# Patient Record
Sex: FEMALE | Race: WHITE | Employment: OTHER | URBAN - METROPOLITAN AREA
[De-identification: names, ages, dates, MRNs, and addresses within clinical notes are randomized per-mention and may not be internally consistent; named-entity substitution may affect disease eponyms.]

---

## 2018-09-10 ENCOUNTER — HOSPITAL ENCOUNTER (EMERGENCY)
Facility: CLINIC | Age: 73
Discharge: HOME | End: 2018-09-10
Attending: PHYSICIAN ASSISTANT | Admitting: PHYSICIAN ASSISTANT
Payer: MEDICARE

## 2018-09-10 VITALS
TEMPERATURE: 98.2 F | DIASTOLIC BLOOD PRESSURE: 79 MMHG | WEIGHT: 112 LBS | RESPIRATION RATE: 16 BRPM | OXYGEN SATURATION: 97 % | BODY MASS INDEX: 19.84 KG/M2 | HEIGHT: 63 IN | SYSTOLIC BLOOD PRESSURE: 152 MMHG

## 2018-09-10 DIAGNOSIS — S61.412A LACERATION OF LEFT HAND WITHOUT FOREIGN BODY, INITIAL ENCOUNTER: ICD-10-CM

## 2018-09-10 PROCEDURE — 99283 EMERGENCY DEPT VISIT LOW MDM: CPT | Mod: 25

## 2018-09-10 PROCEDURE — 90471 IMMUNIZATION ADMIN: CPT

## 2018-09-10 PROCEDURE — 25000128 H RX IP 250 OP 636: Performed by: PHYSICIAN ASSISTANT

## 2018-09-10 PROCEDURE — 12001 RPR S/N/AX/GEN/TRNK 2.5CM/<: CPT

## 2018-09-10 PROCEDURE — 90715 TDAP VACCINE 7 YRS/> IM: CPT | Performed by: PHYSICIAN ASSISTANT

## 2018-09-10 RX ADMIN — CLOSTRIDIUM TETANI TOXOID ANTIGEN (FORMALDEHYDE INACTIVATED), CORYNEBACTERIUM DIPHTHERIAE TOXOID ANTIGEN (FORMALDEHYDE INACTIVATED), BORDETELLA PERTUSSIS TOXOID ANTIGEN (GLUTARALDEHYDE INACTIVATED), BORDETELLA PERTUSSIS FILAMENTOUS HEMAGGLUTININ ANTIGEN (FORMALDEHYDE INACTIVATED), BORDETELLA PERTUSSIS PERTACTIN ANTIGEN, AND BORDETELLA PERTUSSIS FIMBRIAE 2/3 ANTIGEN 0.5 ML: 5; 2; 2.5; 5; 3; 5 INJECTION, SUSPENSION INTRAMUSCULAR at 15:12

## 2018-09-10 ASSESSMENT — ENCOUNTER SYMPTOMS: WOUND: 1

## 2018-09-10 NOTE — ED AVS SNAPSHOT
Emergency Department    [address] [facility] [address]    Phone:  [phone]    Fax:  [phone]                                       Dorothea Dugan   MRN: [id number]    Department:   Emergency Department   Date of Visit:  9/10/2018           After Visit Summary Signature Page     I have received my discharge instructions, and my questions have been answered. I have discussed any challenges I see with this plan with the nurse or doctor.    ..........................................................................................................................................  Patient/Patient Representative Signature      ..........................................................................................................................................  Patient Representative Print Name and Relationship to Patient    ..................................................               ................................................  Date                                            Time    ..........................................................................................................................................  Reviewed by Signature/Title    ...................................................              ..............................................  Date                                                            Time          22EPIC Rev 08/18

## 2018-09-10 NOTE — ED PROVIDER NOTES
"  History     Chief Complaint:  Laceration    HPI   Dorothea Dugan is a 73 year old female who presents for evaluation of a laceration. She was attempting to open a vial of hand lotion with scissors, when it slipped and sliced her left hand between her thumb and index finger. She wrapped her hand in a towel and has been elevating her arm. She is not on any blood thinners.    Allergies:  Strawberry  Penicillins     Medications:    The patient is not currently taking any prescribed medications.     Past Medical History:    Anxiety  Insomnia  Major depressive disorder  Osteoporosis    Past Surgical History:    ENT surgery   section  Orthopedic surgery - ankle    Family History:    No past pertinent family history.     Social History:  Relationship status:   Tobacco use: Former.  Alcohol use: Yes, socially.  The patient presents with her .    Marital Status:   [2]     Review of Systems   Skin: Positive for wound.   All other systems reviewed and are negative.    Physical Exam   Vitals:  Patient Vitals for the past 24 hrs:   BP Temp Temp src Heart Rate Resp SpO2 Height Weight   09/10/18 1408 152/79 98.2  F (36.8  C) Oral 123 16 97 % 1.6 m (5' 3\") 50.8 kg (112 lb)        Physical Exam  Constitutional: Alert, attentive, GCS 15   CV: 2+ radial pulse, brisk distal cap refill  Pulm: No respiratory distress  MSK: Full ROM of left 1st and 2nd digit without pain.   Neurological: Alert, attentive  5/5 strength to the 1st and 2nd MCP and IP joints. 5/5 strength with thumb-index finger opposition. sensation intact distal to laceration.   Skin: Skin is warm and dry. 1 cm laceration between left 1st and 2nd digit web space.   Psych: Normal mood and affect     Emergency Department Course        Laceration Repair        LACERATION:  A simple and superficial minimally Contaminated 1 cm laceration.      LOCATION:  Web space between 1st and 2nd digit on left hand      FUNCTION:  Distally sensation, " circulation, motor and tendon function are intact.      ANESTHESIA:  Local using lidocaine 1% w/ epinephrine total of 1 mLs      PREPARATION:  Irrigation with Normal Saline and Shur Clens      DEBRIDEMENT:  no debridement and wound explored, no foreign body found      CLOSURE:  Wound was closed with One Layer.  Skin closed with 1 x 5.0 Ethylon using interrupted sutures.       Interventions:  1512 Tdap 0.5 mL IM     Emergency Department Course:  Nursing notes and vitals reviewed.  I performed an exam of the patient as documented above.   Findings and plan explained to the Patient. Patient discharged home with instructions regarding supportive care, medications, and reasons to return. The importance of close follow-up was reviewed.    I personally answered all related questions prior to discharge.    Impression & Plan      Medical Decision Making:  Dorothea Dugan is a 73 year old female who presents for evaluation of a laceration in between the first and second webspace of the left hand.  CMS intact distally.  The wound was carefully evaluated and explored.  There was significant amount of bleeding from the laceration so 1 suture was placed to limit bleeding. There is no evidence of muscular, tendon, or bony damage with this laceration.  No signs of foreign body.  Possible complications (infection, scarring) were reviewed with the patient.  Typically with sutures on the hand I recommend having the sutures removed in 7 days.  Patient is going out of town and is leaving Saturday morning.  I told her that she can make an appointment with her primary Friday  afternoon and have them evaluate the suture and if appropriate this can be removed at that time.  Tetanus was updated.      Diagnosis:    ICD-10-CM    1. Laceration of left hand without foreign body, initial encounter S61.412A       Disposition:   Discharged to home    Scribe Disclosure:  Hue RICHARDSON, am serving as a scribe at 2:28 PM on 9/10/2018 to  document services personally performed by Jacki Zurita PA-C, based on my observations and the provider's statements to me.     Hue Wagner  9/10/2018    EMERGENCY DEPARTMENT       Jacki Zurita PA-C  09/10/18 5929

## 2018-09-10 NOTE — ED NOTES
Bed: FT02  Expected date:   Expected time:   Means of arrival:   Comments:  Triage Blomme hand lac

## 2018-09-10 NOTE — ED AVS SNAPSHOT
Emergency Department    [address] [facility] [address]    Phone:  [phone]    Fax:  [phone]                                       Dorothea Dugan   MRN: [id number]    Department:   Emergency Department   Date of Visit:  9/10/2018           Patient Information     Date Of Birth          1945        Your diagnoses for this visit were:     Laceration of left hand without foreign body, initial encounter        You were seen by Jacki Zurita PA-C.      Follow-up Information     Follow up with [facility] In 5 days.    Why:  For suture removal    Contact information:    [address] [facility] [id number] [phone]          Discharge Instructions       *Suture removal in 5-7 days.   *Return for focal numbness, tingling, or weakness.  Return for worsening pain.    Discharge Instructions  Laceration (Cut)    You were seen today for a laceration (cut).  Your provider examined your laceration for any problems such a buried foreign body (like glass, a splinter, or gravel), or injury to blood vessels, tendons, and nerves.  Your provider may have also rinsed and/or scrubbed your laceration to help prevent an infection. It may not be possible to find all problems with your laceration on the first visit; occasionally foreign bodies or a tendon injury can go undetected.    Your laceration may have been closed in one of several ways:    No closure: many wounds will heal just fine without closure.    Stitches: regular stitches that require removal.    Staples: skin staples are often used in the scalp/head.    Wound adhesive (glue): skin glue can be used for certain lacerations and doesn t require removal.    Wound strips (aka Butterfly bandages or steri-strips): these are bandages that help to close a wound.    Absorbable stitches:  dissolving  stitches that go away on their own and usually don t require removal.    A small percentage of wounds will develop an  infection regardless of how well the wound is cared for. Antibiotics are generally not indicated to prevent an infection so are only given for a small number of high-risk wounds. Some lacerations are too high risk to close, and are left open to heal because closure can increase the likelihood that an infection will develop.    Remember that all lacerations, no matter how expertly repaired, will cause scarring. We consider many factors, techniques, and materials, in our efforts to provide the best possible cosmetic outcome.    Generally, every Emergency Department visit should have a follow-up clinic visit with either a primary or a specialty clinic/provider. Please follow-up as instructed by your emergency provider today.     Return to the Emergency Department right away if:    You have more redness, swelling, pain, drainage (pus), a bad smell, or red streaking from your laceration as these symptoms could indicate an infection.    You have a fever of 100.4 F or more.    You have bleeding that you cannot stop at home. If your cut starts to bleed, hold pressure on the bleeding area with a clean cloth or put pressure over the bandage.  If the bleeding does not stop after using constant pressure for 30 minutes, you should return to the Emergency Department for further treatment.    An area past the laceration is cool, pale, or blue compared with the other side, or has a slower return of color when squeezed.    Your dressing seems too tight or starts to get uncomfortable or painful. For children, signs of a problem might be irritability or restlessness.    You have loss of normal function or use of an area, such as being unable to straighten or bend a finger normally.    You have a numb area past the laceration.    Return to the Emergency Department or see your regular provider if:    The laceration starts to come open.     You have something coming out of the cut or a feeling that there is something in the  laceration.    Your wound will not heal, or keeps breaking open. There can always be glass, wood, dirt or other things in any wound.  They will not always show up, even on x-rays.  If a wound does not heal, this may be why, and it is important to follow-up with your regular provider.    Home Care:    Take your dressing off in 12-24 hours, or as instructed by your provider, to check your laceration. Remove the dressing sooner if it seems too tight or painful, or if it is getting numb, tingly, or pale past the dressing.    Gently wash your laceration 1-2 times daily with clean water and mild soap. It is okay to shower or run clean water over the laceration, but do not let the laceration soak in water (no swimming).    If your laceration was closed with wound adhesive or strips: pat it dry and leave it open to the air. For all other repairs: after you wash your laceration, or at least 2 times a day, apply antibiotic ointment (such as Neosporin  or Bacitracin ) to the laceration, then cover it with a Band-Aid  or gauze.    Keep the laceration clean. Wear gloves or other protective clothing if you are around dirt.    Follow-up for removal:    If your wound was closed with staples or regular stitches, they need to be removed according to the instructions and timeline specified by your provider today.    If your wound was closed with absorbable ( dissolving ) sutures, they should fall out, dissolve, or not be visible in about one week. If they are still visible, then they should be removed according to the instructions and timeline specified by your provider today.    Scars:  To help minimize scarring:    Wear sunscreen over the healed laceration when out in the sun.    Massage the area regularly once healed.    You may apply Vitamin E to the healed wound.    Wait. Scars improve in appearance over months and years.    If you were given a prescription for medicine here today, be sure to read all of the information  (including the package insert) that comes with your prescription.  This will include important information about the medicine, its side effects, and any warnings that you need to know about.  The pharmacist who fills the prescription can provide more information and answer questions you may have about the medicine.  If you have questions or concerns that the pharmacist cannot address, please call or return to the Emergency Department.       Remember that you can always come back to the Emergency Department if you are not able to see your regular provider in the amount of time listed above, if you get any new symptoms, or if there is anything that worries you.     24 Hour Appointment Hotline       To make an appointment at any [facility], call [phone] ([phone]). If you don't have a family doctor or clinic, we will help you find one. [facility] are conveniently located to serve the needs of you and your family.             Review of your medicines      Notice     You have not been prescribed any medications.            Orders Needing Specimen Collection     None      Pending Results     No orders found from 9/8/2018 to 9/11/2018.            Pending Culture Results     No orders found from 9/8/2018 to 9/11/2018.            Pending Results Instructions     If you had any lab results that were not finalized at the time of your Discharge, you can call the ED Lab Result RN at [phone]. You will be contacted by this team for any positive Lab results or changes in treatment. The nurses are available 7 days a week from 10A to 6:30P.  You can leave a message 24 hours per day and they will return your call.        Test Results From Your Hospital Stay               Clinical Quality Measure: Blood Pressure Screening     Your blood pressure was checked while you were in the emergency department today. The last reading we obtained was  BP: 152/79 . Please read the guidelines below about what these  "numbers mean and what you should do about them.  If your systolic blood pressure (the top number) is less than 120 and your diastolic blood pressure (the bottom number) is less than 80, then your blood pressure is normal. There is nothing more that you need to do about it.  If your systolic blood pressure (the top number) is 120-139 or your diastolic blood pressure (the bottom number) is 80-89, your blood pressure may be higher than it should be. You should have your blood pressure rechecked within a year by a primary care provider.  If your systolic blood pressure (the top number) is 140 or greater or your diastolic blood pressure (the bottom number) is 90 or greater, you may have high blood pressure. High blood pressure is treatable, but if left untreated over time it can put you at risk for heart attack, stroke, or kidney failure. You should have your blood pressure rechecked by a primary care provider within the next 4 weeks.  If your provider in the emergency department today gave you specific instructions to follow-up with your doctor or provider even sooner than that, you should follow that instruction and not wait for up to 4 weeks for your follow-up visit.        Thank you for choosing [facility]       Thank you for choosing [facility] for your care. Our goal is always to provide you with excellent care. Hearing back from our patients is one way we can continue to improve our services. Please take a few minutes to complete the written survey that you may receive in the mail after you visit with us. Thank you!        [employer] Information     [employer] lets you send messages to your doctor, view your test results, renew your prescriptions, schedule appointments and more. To sign up, go to [email or web address] . Click on \"Log in\" on the left side of the screen, which will take you to the Welcome page. Then click on \"Sign up Now\" on the right side of the page.     You will be asked to enter the access code listed " below, as well as some personal information. Please follow the directions to create your username and password.     Your access code is: [email or web address]  Expires: 2018  2:54 PM     Your access code will  in 90 days. If you need help or a new code, please call your [facility] clinic or [phone].        Care EveryWhere ID     This is your Care EveryWhere ID. This could be used by other organizations to access your [facility] medical records  [id number]        Equal Access to Services     COMFORT WRAY : Rl renee Sohiral, wanasima luezekiel, qayunier kaalelías art, halie nichols . So [facility] [phone].    ATENCIÓN: Si habla español, tiene a busby disposición servicios gratuitos de asistencia lingüística. Llame al [phone].    We comply with applicable federal civil rights laws and [place] laws. We do not discriminate on the basis of race, color, national origin, age, disability, sex, sexual orientation, or gender identity.            After Visit Summary       This is your record. Keep this with you and show to your community pharmacist(s) and doctor(s) at your next visit.

## 2018-09-10 NOTE — DISCHARGE INSTRUCTIONS
*Suture removal in 5-7 days.   *Return for focal numbness, tingling, or weakness.  Return for worsening pain.    Discharge Instructions  Laceration (Cut)    You were seen today for a laceration (cut).  Your provider examined your laceration for any problems such a buried foreign body (like glass, a splinter, or gravel), or injury to blood vessels, tendons, and nerves.  Your provider may have also rinsed and/or scrubbed your laceration to help prevent an infection. It may not be possible to find all problems with your laceration on the first visit; occasionally foreign bodies or a tendon injury can go undetected.    Your laceration may have been closed in one of several ways:    No closure: many wounds will heal just fine without closure.    Stitches: regular stitches that require removal.    Staples: skin staples are often used in the scalp/head.    Wound adhesive (glue): skin glue can be used for certain lacerations and doesn t require removal.    Wound strips (aka Butterfly bandages or steri-strips): these are bandages that help to close a wound.    Absorbable stitches:  dissolving  stitches that go away on their own and usually don t require removal.    A small percentage of wounds will develop an infection regardless of how well the wound is cared for. Antibiotics are generally not indicated to prevent an infection so are only given for a small number of high-risk wounds. Some lacerations are too high risk to close, and are left open to heal because closure can increase the likelihood that an infection will develop.    Remember that all lacerations, no matter how expertly repaired, will cause scarring. We consider many factors, techniques, and materials, in our efforts to provide the best possible cosmetic outcome.    Generally, every Emergency Department visit should have a follow-up clinic visit with either a primary or a specialty clinic/provider. Please follow-up as instructed by your emergency provider  today.     Return to the Emergency Department right away if:    You have more redness, swelling, pain, drainage (pus), a bad smell, or red streaking from your laceration as these symptoms could indicate an infection.    You have a fever of 100.4 F or more.    You have bleeding that you cannot stop at home. If your cut starts to bleed, hold pressure on the bleeding area with a clean cloth or put pressure over the bandage.  If the bleeding does not stop after using constant pressure for 30 minutes, you should return to the Emergency Department for further treatment.    An area past the laceration is cool, pale, or blue compared with the other side, or has a slower return of color when squeezed.    Your dressing seems too tight or starts to get uncomfortable or painful. For children, signs of a problem might be irritability or restlessness.    You have loss of normal function or use of an area, such as being unable to straighten or bend a finger normally.    You have a numb area past the laceration.    Return to the Emergency Department or see your regular provider if:    The laceration starts to come open.     You have something coming out of the cut or a feeling that there is something in the laceration.    Your wound will not heal, or keeps breaking open. There can always be glass, wood, dirt or other things in any wound.  They will not always show up, even on x-rays.  If a wound does not heal, this may be why, and it is important to follow-up with your regular provider.    Home Care:    Take your dressing off in 12-24 hours, or as instructed by your provider, to check your laceration. Remove the dressing sooner if it seems too tight or painful, or if it is getting numb, tingly, or pale past the dressing.    Gently wash your laceration 1-2 times daily with clean water and mild soap. It is okay to shower or run clean water over the laceration, but do not let the laceration soak in water (no swimming).    If your  laceration was closed with wound adhesive or strips: pat it dry and leave it open to the air. For all other repairs: after you wash your laceration, or at least 2 times a day, apply antibiotic ointment (such as Neosporin  or Bacitracin ) to the laceration, then cover it with a Band-Aid  or gauze.    Keep the laceration clean. Wear gloves or other protective clothing if you are around dirt.    Follow-up for removal:    If your wound was closed with staples or regular stitches, they need to be removed according to the instructions and timeline specified by your provider today.    If your wound was closed with absorbable ( dissolving ) sutures, they should fall out, dissolve, or not be visible in about one week. If they are still visible, then they should be removed according to the instructions and timeline specified by your provider today.    Scars:  To help minimize scarring:    Wear sunscreen over the healed laceration when out in the sun.    Massage the area regularly once healed.    You may apply Vitamin E to the healed wound.    Wait. Scars improve in appearance over months and years.    If you were given a prescription for medicine here today, be sure to read all of the information (including the package insert) that comes with your prescription.  This will include important information about the medicine, its side effects, and any warnings that you need to know about.  The pharmacist who fills the prescription can provide more information and answer questions you may have about the medicine.  If you have questions or concerns that the pharmacist cannot address, please call or return to the Emergency Department.       Remember that you can always come back to the Emergency Department if you are not able to see your regular provider in the amount of time listed above, if you get any new symptoms, or if there is anything that worries you.

## 2018-10-23 ENCOUNTER — TRANSFERRED RECORDS (OUTPATIENT)
Dept: HEALTH INFORMATION MANAGEMENT | Facility: CLINIC | Age: 73
End: 2018-10-23

## 2018-12-04 ENCOUNTER — OFFICE VISIT (OUTPATIENT)
Dept: CARDIOLOGY | Facility: CLINIC | Age: 73
End: 2018-12-04
Payer: COMMERCIAL

## 2018-12-04 VITALS
HEART RATE: 96 BPM | SYSTOLIC BLOOD PRESSURE: 138 MMHG | HEIGHT: 66 IN | BODY MASS INDEX: 16.55 KG/M2 | WEIGHT: 103 LBS | DIASTOLIC BLOOD PRESSURE: 62 MMHG

## 2018-12-04 DIAGNOSIS — R94.31 NONSPECIFIC ABNORMAL ELECTROCARDIOGRAM (ECG) (EKG): Primary | ICD-10-CM

## 2018-12-04 PROCEDURE — 99203 OFFICE O/P NEW LOW 30 MIN: CPT | Performed by: INTERNAL MEDICINE

## 2018-12-04 RX ORDER — ESCITALOPRAM OXALATE 5 MG/1
2.5 TABLET ORAL DAILY
COMMUNITY
End: 2024-03-11

## 2018-12-04 NOTE — PROGRESS NOTES
Service Date: 2018      REQUESTING PROVIDER:  Dr. Virgen.      INDICATION:  Short MO noted on an ECG.      HISTORY OF PRESENT ILLNESS:  Ms. Tucker is a pleasant 73-year-old female with no significant past medical history other than anxiety who was seen in annual followup and underwent an ECG.  This showed sinus rhythm with a short MO interval of 108 milliseconds.  Interestingly, from a cardiovascular standpoint, the patient has no symptoms and denies any chest pain, chest pressure, shortness of breath, orthopnea, paroxysmal nocturnal dyspnea, syncope, presyncope or palpitations.  She has never had prior cardiac issues.  She has no cardiovascular risk factors such as diabetes, tobacco abuse, dyslipidemia or hypertension.      Please see my separate note with her full physical examination.      IMPRESSION AND PLAN:  Ms. Tucker is a pleasant 73-year-old female with a nonspecific ECG finding of a short MO interval.  I do not see evidence for short MO syndrome as she is having no tachycardia.  I cannot find an indication to perform a Holter/ZIO monitor as she is completely asymptomatic.  However, given the nonspecific ECG findings I do believe that it would be reasonable to consider a transthoracic echocardiogram to make sure the patient has a structurally normal heart.  She will follow up as needed unless the transthoracic echocardiogram is abnormal.  I have instructed the patient that with any new symptoms such as chest pain, dyspnea with exertion, palpitations, syncope or presyncope, she will need to be re-evaluated by Cardiology in the future.         CHIKA ASHBY MD             D: 2018   T: 2018   MT: AMA      Name:     KYLER TUCKER   MRN:      [id number]        Account:      [id number]   :      1945           Service Date: 2018      Document: [id number]

## 2018-12-04 NOTE — MR AVS SNAPSHOT
After Visit Summary   12/4/2018    Dorothea Dugan    MRN: [id number]           Patient Information     Date Of Birth          1945        Visit Information        Provider Department      12/4/2018 8:45 AM Michele Jim MD [facility]        Today's Diagnoses     Nonspecific abnormal electrocardiogram (ECG) (EKG)    -  1       Follow-ups after your visit        Your next 10 appointments already scheduled     Dec 11, 2018  7:45 AM CST   Echo Complete with [id number]   [facility] CV Echocardiography (Card[facility]ovascular [facility] at [facility] [place])    [address] [place] [address] [id number] [phone]           1. Please bring or wear a comfortable two-piece outfit. 2. You may eat, drink and take your normal medicines. 3. For any questions that cannot be answered, please contact the ordering physician              Future tests that were ordered for you today     Open Future Orders        Priority Expected Expires Ordered    Echocardiogram Complete Routine  12/4/2019 12/4/2018            Who to contact     If you have questions or need follow up information about today's clinic visit or your schedule please contact [facility] directly at [phone].  Normal or non-critical lab and imaging results will be communicated to you by MyChart, letter or phone within 4 business days after the clinic has received the results. If you do not hear from us within 7 days, please contact the clinic through MyChart or phone. If you have a critical or abnormal lab result, we will notify you by phone as soon as possible.  Submit refill requests through [employer] or call your pharmacy and they will forward the refill request to us. Please allow 3 business days for your refill to be completed.          Additional Information About Your Visit        Care EveryWhere ID     This is your Care EveryWhere ID. This could  "be used by other organizations to access your [facility] medical records  [id number]        Your Vitals Were     Pulse Height BMI (Body Mass Index)             96 1.664 m (5' 5.5\") 16.88 kg/m2          Blood Pressure from Last 3 Encounters:   12/04/18 138/62   09/10/18 152/79   12/15/14 131/69    Weight from Last 3 Encounters:   12/04/18 46.7 kg (103 lb)   09/10/18 50.8 kg (112 lb)   12/15/14 54.4 kg (120 lb)               Primary Care Provider Office Phone # Fax #    Sun Virgen [phone]       [address]  [address]  [facility] [address]        Equal Access to Services     COMFORT WRAY : Rl gonzalezo Sohiral, waaxda luqadaha, qaybta kaalmada adeegyada, halie tapia. So [facility] [phone].    ATENCIÓN: Si habla español, tiene a busby disposición servicios gratuitos de asistencia lingüística. Llame al [phone].    We comply with applicable federal civil rights laws and [place] laws. We do not discriminate on the basis of race, color, national origin, age, disability, sex, sexual orientation, or gender identity.            Thank you!     Thank you for choosing [facility]  for your care. Our goal is always to provide you with excellent care. Hearing back from our patients is one way we can continue to improve our services. Please take a few minutes to complete the written survey that you may receive in the mail after your visit with us. Thank you!             Your Updated Medication List - Protect others around you: Learn how to safely use, store and throw away your medicines at www.disposemymeds.org.          This list is accurate as of 12/4/18  9:57 AM.  Always use your most recent med list.                   Brand Name Dispense Instructions for use Diagnosis    escitalopram 5 MG tablet    LEXAPRO     Take by mouth daily 1/2 tab daily.        MULTI VITAMIN DAILY PO      Take by mouth daily          "

## 2018-12-04 NOTE — PROGRESS NOTES
HPI and Plan:   See dictation    Orders Placed This Encounter   Procedures     Echocardiogram Complete       Orders Placed This Encounter   Medications     escitalopram (LEXAPRO) 5 MG tablet     Sig: Take by mouth daily 1/2 tab daily.     Multiple Vitamin (MULTI VITAMIN DAILY PO)     Sig: Take by mouth daily       There are no discontinued medications.      Encounter Diagnosis   Name Primary?     Nonspecific abnormal electrocardiogram (ECG) (EKG) Yes       CURRENT MEDICATIONS:  Current Outpatient Prescriptions   Medication Sig Dispense Refill     escitalopram (LEXAPRO) 5 MG tablet Take by mouth daily 1/2 tab daily.       Multiple Vitamin (MULTI VITAMIN DAILY PO) Take by mouth daily         ALLERGIES     Allergies   Allergen Reactions     Strawberry      Penicillins Rash       PAST MEDICAL HISTORY:  Past Medical History:   Diagnosis Date     Anxiety state, unspecified      Breast screening, unspecified      Insomnia, unspecified      Major depressive disorder, single episode, unspecified      Osteoporosis, unspecified      Unspecified menopausal and postmenopausal disorder        PAST SURGICAL HISTORY:  Past Surgical History:   Procedure Laterality Date     ENT SURGERY      oral surgery 2014     GYN SURGERY           ORTHOPEDIC SURGERY      ankle surgery       FAMILY HISTORY:  Family History   Problem Relation Age of Onset     Other - See Comments Mother 95     Old age     Lymphoma Mother      Prostate Cancer Father        SOCIAL HISTORY:  Social History     Social History     Marital status:      Spouse name: N/A     Number of children: N/A     Years of education: N/A     Social History Main Topics     Smoking status: Former Smoker     Packs/day: 0.10     Years: 2.00     Types: Cigarettes     Start date:      Quit date:      Smokeless tobacco: Never Used     Alcohol use Yes      Comment: socially     Drug use: No     Sexual activity: Not Asked     Other Topics Concern     Parent/Sibling W/  "Cabg, Mi Or Angioplasty Before 65f 55m? No     Social History Narrative       Review of Systems:  Skin:  Positive for (dry skin)       Eyes:  Positive for (reading) glasses    ENT:  Negative      Respiratory:  Positive for dyspnea on exertion     Cardiovascular:  Negative      Gastroenterology: Negative      Genitourinary:  Negative      Musculoskeletal:  Negative      Neurologic:  Negative      Psychiatric:  Positive for anxiety    Heme/Lymph/Imm:  Positive for allergies    Endocrine:  Negative        Physical Exam:  Vitals: /62  Pulse 96  Ht 1.664 m (5' 5.5\")  Wt 46.7 kg (103 lb)  BMI 16.88 kg/m2    Constitutional:  cooperative;in no acute distress        Skin:  warm and dry to the touch          Head:  normocephalic        Eyes:  sclera white        Lymph:No Cervical lymphadenopathy present     ENT:  no pallor or cyanosis        Neck:  no stiffness        Respiratory:  clear to auscultation         Cardiac: regular rhythm;normal S1 and S2                pulses full and equal                                        GI:  abdomen soft        Extremities and Muscular Skeletal:  no edema              Neurological:  affect appropriate        Psych:  Alert and Oriented x 3        CC  No referring provider defined for this encounter.              "

## 2018-12-04 NOTE — LETTER
2018    Sun Virgen MD, MD  [address]  [facility] [address]    RE: Dorothea Dugan       Dear Colleague,    I had the pleasure of seeing Dorothea Dugan in the [facility].    HPI and Plan:   See dictation    Orders Placed This Encounter   Procedures     Echocardiogram Complete       Orders Placed This Encounter   Medications     escitalopram (LEXAPRO) 5 MG tablet     Sig: Take by mouth daily 1/2 tab daily.     Multiple Vitamin (MULTI VITAMIN DAILY PO)     Sig: Take by mouth daily       There are no discontinued medications.      Encounter Diagnosis   Name Primary?     Nonspecific abnormal electrocardiogram (ECG) (EKG) Yes       CURRENT MEDICATIONS:  Current Outpatient Prescriptions   Medication Sig Dispense Refill     escitalopram (LEXAPRO) 5 MG tablet Take by mouth daily 1/2 tab daily.       Multiple Vitamin (MULTI VITAMIN DAILY PO) Take by mouth daily         ALLERGIES     Allergies   Allergen Reactions     Strawberry      Penicillins Rash       PAST MEDICAL HISTORY:  Past Medical History:   Diagnosis Date     Anxiety state, unspecified      Breast screening, unspecified      Insomnia, unspecified      Major depressive disorder, single episode, unspecified      Osteoporosis, unspecified      Unspecified menopausal and postmenopausal disorder        PAST SURGICAL HISTORY:  Past Surgical History:   Procedure Laterality Date     ENT SURGERY      oral surgery 2014     GYN SURGERY           ORTHOPEDIC SURGERY      ankle surgery       FAMILY HISTORY:  Family History   Problem Relation Age of Onset     Other - See Comments Mother 95     Old age     Lymphoma Mother      Prostate Cancer Father        SOCIAL HISTORY:  Social History     Social History     Marital status:      Spouse name: N/A     Number of children: N/A     Years of education: N/A     Social History Main Topics     Smoking status: Former Smoker     Packs/day: 0.10     Years: 2.00      "Types: Cigarettes     Start date: 1967     Quit date: 1978     Smokeless tobacco: Never Used     Alcohol use Yes      Comment: socially     Drug use: No     Sexual activity: Not Asked     Other Topics Concern     Parent/Sibling W/ Cabg, Mi Or Angioplasty Before 65f 55m? No     Social History Narrative       Review of Systems:  Skin:  Positive for (dry skin)       Eyes:  Positive for (reading) glasses    ENT:  Negative      Respiratory:  Positive for dyspnea on exertion     Cardiovascular:  Negative      Gastroenterology: Negative      Genitourinary:  Negative      Musculoskeletal:  Negative      Neurologic:  Negative      Psychiatric:  Positive for anxiety    Heme/Lymph/Imm:  Positive for allergies    Endocrine:  Negative        Physical Exam:  Vitals: /62  Pulse 96  Ht 1.664 m (5' 5.5\")  Wt 46.7 kg (103 lb)  BMI 16.88 kg/m2    Constitutional:  cooperative;in no acute distress        Skin:  warm and dry to the touch          Head:  normocephalic        Eyes:  sclera white        Lymph:No Cervical lymphadenopathy present     ENT:  no pallor or cyanosis        Neck:  no stiffness        Respiratory:  clear to auscultation         Cardiac: regular rhythm;normal S1 and S2                pulses full and equal                                        GI:  abdomen soft        Extremities and Muscular Skeletal:  no edema              Neurological:  affect appropriate        Psych:  Alert and Oriented x 3        CC  No referring provider defined for this encounter.                Thank you for allowing me to participate in the care of your patient.      Sincerely,     Michele Jim MD     [facility]    cc:   No referring provider defined for this encounter.        "

## 2018-12-04 NOTE — LETTER
12/4/2018      Sun Virgen MD, MD  [employer] [address]  [facility] [address]      RE: Kyler Tucker       Dear Colleague,    I had the pleasure of seeing Kyler Tucker in the [facility].    Service Date: 12/04/2018      REQUESTING PROVIDER:  Dr. Virgen.      INDICATION:  Short HI noted on an ECG.      HISTORY OF PRESENT ILLNESS:  Ms. Tucker is a pleasant 73-year-old female with no significant past medical history other than anxiety who was seen in annual followup and underwent an ECG.  This showed sinus rhythm with a short HI interval of 108 milliseconds.  Interestingly, from a cardiovascular standpoint, the patient has no symptoms and denies any chest pain, chest pressure, shortness of breath, orthopnea, paroxysmal nocturnal dyspnea, syncope, presyncope or palpitations.  She has never had prior cardiac issues.  She has no cardiovascular risk factors such as diabetes, tobacco abuse, dyslipidemia or hypertension.      Please see my separate note with her full physical examination.      IMPRESSION AND PLAN:  Ms. Tucker is a pleasant 73-year-old female with a nonspecific ECG finding of a short HI interval.  I do not see evidence for short HI syndrome as she is having no tachycardia.  I cannot find an indication to perform a Holter/ZIO monitor as she is completely asymptomatic.  However, given the nonspecific ECG findings I do believe that it would be reasonable to consider a transthoracic echocardiogram to make sure the patient has a structurally normal heart.  She will follow up as needed unless the transthoracic echocardiogram is abnormal.  I have instructed the patient that with any new symptoms such as chest pain, dyspnea with exertion, palpitations, syncope or presyncope, she will need to be re-evaluated by Cardiology in the future.         CHIKA ASHBY MD             D: 12/04/2018   T: 12/04/2018   MT: AMA      Name:     KYLER TUCKER   MRN:      [id number]         Account:      [id number]   :      1945           Service Date: 2018      Document: [id number]         Outpatient Encounter Prescriptions as of 2018   Medication Sig Dispense Refill     escitalopram (LEXAPRO) 5 MG tablet Take by mouth daily 1/2 tab daily.       Multiple Vitamin (MULTI VITAMIN DAILY PO) Take by mouth daily       No facility-administered encounter medications on file as of 2018.        Again, thank you for allowing me to participate in the care of your patient.      Sincerely,    Michele Jim MD     [facility]

## 2020-07-13 ENCOUNTER — HOSPITAL ENCOUNTER (INPATIENT)
Facility: CLINIC | Age: 75
LOS: 4 days | Discharge: HOME HEALTH | DRG: 470 | End: 2020-07-17
Attending: EMERGENCY MEDICINE | Admitting: INTERNAL MEDICINE
Payer: MEDICARE

## 2020-07-13 ENCOUNTER — APPOINTMENT (OUTPATIENT)
Dept: GENERAL RADIOLOGY | Facility: CLINIC | Age: 75
DRG: 470 | End: 2020-07-13
Attending: EMERGENCY MEDICINE
Payer: MEDICARE

## 2020-07-13 DIAGNOSIS — S72.001A CLOSED DISPLACED FRACTURE OF RIGHT FEMORAL NECK (H): ICD-10-CM

## 2020-07-13 DIAGNOSIS — S72.001A HIP FRACTURE, RIGHT, CLOSED, INITIAL ENCOUNTER (H): Primary | ICD-10-CM

## 2020-07-13 PROBLEM — S72.009A HIP FRACTURE (H): Status: ACTIVE | Noted: 2020-07-13

## 2020-07-13 PROBLEM — S72.009A HIP FRACTURE (H): Status: RESOLVED | Noted: 2020-07-13 | Resolved: 2020-07-13

## 2020-07-13 PROBLEM — D72.829 LEUKOCYTOSIS: Status: ACTIVE | Noted: 2020-07-13

## 2020-07-13 LAB
ABO + RH BLD: NORMAL
ABO + RH BLD: NORMAL
ANION GAP SERPL CALCULATED.3IONS-SCNC: 3 MMOL/L (ref 3–14)
BASOPHILS # BLD AUTO: 0 10E9/L (ref 0–0.2)
BASOPHILS NFR BLD AUTO: 0.2 %
BLD GP AB SCN SERPL QL: NORMAL
BLOOD BANK CMNT PATIENT-IMP: NORMAL
BUN SERPL-MCNC: 15 MG/DL (ref 7–30)
CALCIUM SERPL-MCNC: 10.4 MG/DL (ref 8.5–10.1)
CHLORIDE SERPL-SCNC: 107 MMOL/L (ref 94–109)
CO2 SERPL-SCNC: 29 MMOL/L (ref 20–32)
CREAT SERPL-MCNC: 0.83 MG/DL (ref 0.52–1.04)
DIFFERENTIAL METHOD BLD: ABNORMAL
EOSINOPHIL # BLD AUTO: 0.1 10E9/L (ref 0–0.7)
EOSINOPHIL NFR BLD AUTO: 0.6 %
ERYTHROCYTE [DISTWIDTH] IN BLOOD BY AUTOMATED COUNT: 13.1 % (ref 10–15)
GFR SERPL CREATININE-BSD FRML MDRD: 69 ML/MIN/{1.73_M2}
GLUCOSE SERPL-MCNC: 105 MG/DL (ref 70–99)
HCT VFR BLD AUTO: 41.1 % (ref 35–47)
HGB BLD-MCNC: 14.1 G/DL (ref 11.7–15.7)
IMM GRANULOCYTES # BLD: 0.1 10E9/L (ref 0–0.4)
IMM GRANULOCYTES NFR BLD: 0.6 %
INR PPP: 1.02 (ref 0.86–1.14)
LYMPHOCYTES # BLD AUTO: 1.5 10E9/L (ref 0.8–5.3)
LYMPHOCYTES NFR BLD AUTO: 8.3 %
MCH RBC QN AUTO: 29.9 PG (ref 26.5–33)
MCHC RBC AUTO-ENTMCNC: 34.3 G/DL (ref 31.5–36.5)
MCV RBC AUTO: 87 FL (ref 78–100)
MONOCYTES # BLD AUTO: 0.9 10E9/L (ref 0–1.3)
MONOCYTES NFR BLD AUTO: 5 %
NEUTROPHILS # BLD AUTO: 15.1 10E9/L (ref 1.6–8.3)
NEUTROPHILS NFR BLD AUTO: 85.3 %
NRBC # BLD AUTO: 0 10*3/UL
NRBC BLD AUTO-RTO: 0 /100
PLATELET # BLD AUTO: 284 10E9/L (ref 150–450)
POTASSIUM SERPL-SCNC: 4.6 MMOL/L (ref 3.4–5.3)
RBC # BLD AUTO: 4.71 10E12/L (ref 3.8–5.2)
SODIUM SERPL-SCNC: 139 MMOL/L (ref 133–144)
SPECIMEN EXP DATE BLD: NORMAL
WBC # BLD AUTO: 17.7 10E9/L (ref 4–11)

## 2020-07-13 PROCEDURE — 86850 RBC ANTIBODY SCREEN: CPT | Performed by: EMERGENCY MEDICINE

## 2020-07-13 PROCEDURE — 73502 X-RAY EXAM HIP UNI 2-3 VIEWS: CPT

## 2020-07-13 PROCEDURE — 96375 TX/PRO/DX INJ NEW DRUG ADDON: CPT

## 2020-07-13 PROCEDURE — 86901 BLOOD TYPING SEROLOGIC RH(D): CPT | Performed by: EMERGENCY MEDICINE

## 2020-07-13 PROCEDURE — 80048 BASIC METABOLIC PNL TOTAL CA: CPT | Performed by: EMERGENCY MEDICINE

## 2020-07-13 PROCEDURE — 86900 BLOOD TYPING SEROLOGIC ABO: CPT | Performed by: EMERGENCY MEDICINE

## 2020-07-13 PROCEDURE — 25000128 H RX IP 250 OP 636: Performed by: EMERGENCY MEDICINE

## 2020-07-13 PROCEDURE — 85025 COMPLETE CBC W/AUTO DIFF WBC: CPT | Performed by: EMERGENCY MEDICINE

## 2020-07-13 PROCEDURE — 71045 X-RAY EXAM CHEST 1 VIEW: CPT

## 2020-07-13 PROCEDURE — C9803 HOPD COVID-19 SPEC COLLECT: HCPCS

## 2020-07-13 PROCEDURE — 12000000 ZZH R&B MED SURG/OB

## 2020-07-13 PROCEDURE — 99222 1ST HOSP IP/OBS MODERATE 55: CPT | Mod: AI | Performed by: INTERNAL MEDICINE

## 2020-07-13 PROCEDURE — 96374 THER/PROPH/DIAG INJ IV PUSH: CPT

## 2020-07-13 PROCEDURE — 99285 EMERGENCY DEPT VISIT HI MDM: CPT | Mod: 25

## 2020-07-13 PROCEDURE — 85610 PROTHROMBIN TIME: CPT | Performed by: EMERGENCY MEDICINE

## 2020-07-13 PROCEDURE — U0003 INFECTIOUS AGENT DETECTION BY NUCLEIC ACID (DNA OR RNA); SEVERE ACUTE RESPIRATORY SYNDROME CORONAVIRUS 2 (SARS-COV-2) (CORONAVIRUS DISEASE [COVID-19]), AMPLIFIED PROBE TECHNIQUE, MAKING USE OF HIGH THROUGHPUT TECHNOLOGIES AS DESCRIBED BY CMS-2020-01-R: HCPCS | Performed by: EMERGENCY MEDICINE

## 2020-07-13 RX ORDER — DOCUSATE SODIUM 100 MG/1
100 CAPSULE, LIQUID FILLED ORAL 2 TIMES DAILY
Status: DISCONTINUED | OUTPATIENT
Start: 2020-07-13 | End: 2020-07-17 | Stop reason: HOSPADM

## 2020-07-13 RX ORDER — SODIUM CHLORIDE 9 MG/ML
INJECTION, SOLUTION INTRAVENOUS CONTINUOUS
Status: DISCONTINUED | OUTPATIENT
Start: 2020-07-13 | End: 2020-07-14

## 2020-07-13 RX ORDER — ONDANSETRON 2 MG/ML
4 INJECTION INTRAMUSCULAR; INTRAVENOUS EVERY 30 MIN PRN
Status: DISCONTINUED | OUTPATIENT
Start: 2020-07-13 | End: 2020-07-13

## 2020-07-13 RX ORDER — POLYETHYLENE GLYCOL 3350 17 G/17G
17 POWDER, FOR SOLUTION ORAL DAILY PRN
Status: DISCONTINUED | OUTPATIENT
Start: 2020-07-13 | End: 2020-07-17 | Stop reason: HOSPADM

## 2020-07-13 RX ORDER — NALOXONE HYDROCHLORIDE 0.4 MG/ML
.1-.4 INJECTION, SOLUTION INTRAMUSCULAR; INTRAVENOUS; SUBCUTANEOUS
Status: DISCONTINUED | OUTPATIENT
Start: 2020-07-13 | End: 2020-07-14

## 2020-07-13 RX ORDER — LIDOCAINE 40 MG/G
CREAM TOPICAL
Status: DISCONTINUED | OUTPATIENT
Start: 2020-07-13 | End: 2020-07-14

## 2020-07-13 RX ORDER — ONDANSETRON 2 MG/ML
4 INJECTION INTRAMUSCULAR; INTRAVENOUS EVERY 6 HOURS PRN
Status: DISCONTINUED | OUTPATIENT
Start: 2020-07-13 | End: 2020-07-17 | Stop reason: HOSPADM

## 2020-07-13 RX ORDER — MORPHINE SULFATE 4 MG/ML
4 INJECTION, SOLUTION INTRAMUSCULAR; INTRAVENOUS
Status: DISCONTINUED | OUTPATIENT
Start: 2020-07-13 | End: 2020-07-13

## 2020-07-13 RX ORDER — HYDROCODONE BITARTRATE AND ACETAMINOPHEN 5; 325 MG/1; MG/1
1-2 TABLET ORAL EVERY 4 HOURS PRN
Status: DISCONTINUED | OUTPATIENT
Start: 2020-07-13 | End: 2020-07-14

## 2020-07-13 RX ORDER — ACETAMINOPHEN 325 MG/1
650 TABLET ORAL EVERY 4 HOURS PRN
Status: DISCONTINUED | OUTPATIENT
Start: 2020-07-13 | End: 2020-07-14

## 2020-07-13 RX ORDER — MORPHINE SULFATE 2 MG/ML
1-2 INJECTION, SOLUTION INTRAMUSCULAR; INTRAVENOUS
Status: DISCONTINUED | OUTPATIENT
Start: 2020-07-13 | End: 2020-07-14

## 2020-07-13 RX ORDER — ONDANSETRON 4 MG/1
4 TABLET, ORALLY DISINTEGRATING ORAL EVERY 6 HOURS PRN
Status: DISCONTINUED | OUTPATIENT
Start: 2020-07-13 | End: 2020-07-17 | Stop reason: HOSPADM

## 2020-07-13 RX ADMIN — MORPHINE SULFATE 4 MG: 4 INJECTION INTRAVENOUS at 22:22

## 2020-07-13 RX ADMIN — ONDANSETRON 4 MG: 2 INJECTION INTRAMUSCULAR; INTRAVENOUS at 22:22

## 2020-07-13 ASSESSMENT — ACTIVITIES OF DAILY LIVING (ADL)
TOILETING: 0-->INDEPENDENT
RETIRED_COMMUNICATION: 0-->UNDERSTANDS/COMMUNICATES WITHOUT DIFFICULTY
BATHING: 0-->INDEPENDENT
RETIRED_EATING: 0-->INDEPENDENT
SWALLOWING: 0-->SWALLOWS FOODS/LIQUIDS WITHOUT DIFFICULTY
WHICH_OF_THE_ABOVE_FUNCTIONAL_RISKS_HAD_A_RECENT_ONSET_OR_CHANGE?: AMBULATION;FALL HISTORY
NUMBER_OF_TIMES_PATIENT_HAS_FALLEN_WITHIN_LAST_SIX_MONTHS: 1
DRESS: 0-->INDEPENDENT
COGNITION: 0 - NO COGNITION ISSUES REPORTED
AMBULATION: 0-->INDEPENDENT
TRANSFERRING: 0-->INDEPENDENT
FALL_HISTORY_WITHIN_LAST_SIX_MONTHS: YES

## 2020-07-13 ASSESSMENT — ENCOUNTER SYMPTOMS
ARTHRALGIAS: 1
BACK PAIN: 0
HEADACHES: 0

## 2020-07-13 ASSESSMENT — MIFFLIN-ST. JEOR: SCORE: 1043.88

## 2020-07-14 ENCOUNTER — ANESTHESIA EVENT (OUTPATIENT)
Dept: SURGERY | Facility: CLINIC | Age: 75
DRG: 470 | End: 2020-07-14
Payer: MEDICARE

## 2020-07-14 ENCOUNTER — APPOINTMENT (OUTPATIENT)
Dept: GENERAL RADIOLOGY | Facility: CLINIC | Age: 75
DRG: 470 | End: 2020-07-14
Attending: PHYSICIAN ASSISTANT
Payer: MEDICARE

## 2020-07-14 ENCOUNTER — APPOINTMENT (OUTPATIENT)
Dept: GENERAL RADIOLOGY | Facility: CLINIC | Age: 75
DRG: 470 | End: 2020-07-14
Attending: INTERNAL MEDICINE
Payer: MEDICARE

## 2020-07-14 ENCOUNTER — ANESTHESIA (OUTPATIENT)
Dept: SURGERY | Facility: CLINIC | Age: 75
DRG: 470 | End: 2020-07-14
Payer: MEDICARE

## 2020-07-14 LAB
ALBUMIN UR-MCNC: NEGATIVE MG/DL
APPEARANCE UR: CLEAR
BILIRUB UR QL STRIP: NEGATIVE
COLOR UR AUTO: YELLOW
GLUCOSE UR STRIP-MCNC: NEGATIVE MG/DL
HGB UR QL STRIP: NEGATIVE
KETONES UR STRIP-MCNC: NEGATIVE MG/DL
LEUKOCYTE ESTERASE UR QL STRIP: NEGATIVE
NITRATE UR QL: NEGATIVE
PH UR STRIP: 8 PH (ref 5–7)
RBC #/AREA URNS AUTO: 3 /HPF (ref 0–2)
SARS-COV-2 PCR COMMENT: NORMAL
SARS-COV-2 RNA SPEC QL NAA+PROBE: NEGATIVE
SARS-COV-2 RNA SPEC QL NAA+PROBE: NORMAL
SOURCE: ABNORMAL
SP GR UR STRIP: 1.01 (ref 1–1.03)
SPECIMEN SOURCE: NORMAL
SPECIMEN SOURCE: NORMAL
UROBILINOGEN UR STRIP-MCNC: NORMAL MG/DL (ref 0–2)
WBC #/AREA URNS AUTO: <1 /HPF (ref 0–5)

## 2020-07-14 PROCEDURE — 99232 SBSQ HOSP IP/OBS MODERATE 35: CPT | Performed by: INTERNAL MEDICINE

## 2020-07-14 PROCEDURE — 25000125 ZZHC RX 250: Performed by: ORTHOPAEDIC SURGERY

## 2020-07-14 PROCEDURE — 25000132 ZZH RX MED GY IP 250 OP 250 PS 637: Mod: GY | Performed by: PHYSICIAN ASSISTANT

## 2020-07-14 PROCEDURE — 37000009 ZZH ANESTHESIA TECHNICAL FEE, EACH ADDTL 15 MIN: Performed by: ORTHOPAEDIC SURGERY

## 2020-07-14 PROCEDURE — 37000008 ZZH ANESTHESIA TECHNICAL FEE, 1ST 30 MIN: Performed by: ORTHOPAEDIC SURGERY

## 2020-07-14 PROCEDURE — 25000125 ZZHC RX 250: Performed by: INTERNAL MEDICINE

## 2020-07-14 PROCEDURE — 25000128 H RX IP 250 OP 636: Performed by: ANESTHESIOLOGY

## 2020-07-14 PROCEDURE — 40000985 XR PELVIS AD HIP PORTABLE RIGHT 1 VIEW

## 2020-07-14 PROCEDURE — 25000125 ZZHC RX 250: Performed by: NURSE ANESTHETIST, CERTIFIED REGISTERED

## 2020-07-14 PROCEDURE — 40000985 XR PELVIS PORT 1/2 VW

## 2020-07-14 PROCEDURE — 0SR903A REPLACEMENT OF RIGHT HIP JOINT WITH CERAMIC SYNTHETIC SUBSTITUTE, UNCEMENTED, OPEN APPROACH: ICD-10-PCS | Performed by: ORTHOPAEDIC SURGERY

## 2020-07-14 PROCEDURE — 93005 ELECTROCARDIOGRAM TRACING: CPT

## 2020-07-14 PROCEDURE — C1776 JOINT DEVICE (IMPLANTABLE): HCPCS | Performed by: ORTHOPAEDIC SURGERY

## 2020-07-14 PROCEDURE — 25000566 ZZH SEVOFLURANE, EA 15 MIN: Performed by: ORTHOPAEDIC SURGERY

## 2020-07-14 PROCEDURE — 71000012 ZZH RECOVERY PHASE 1 LEVEL 1 FIRST HR: Performed by: ORTHOPAEDIC SURGERY

## 2020-07-14 PROCEDURE — 25800030 ZZH RX IP 258 OP 636: Performed by: ANESTHESIOLOGY

## 2020-07-14 PROCEDURE — 25800030 ZZH RX IP 258 OP 636: Performed by: INTERNAL MEDICINE

## 2020-07-14 PROCEDURE — 25000132 ZZH RX MED GY IP 250 OP 250 PS 637: Mod: GY | Performed by: INTERNAL MEDICINE

## 2020-07-14 PROCEDURE — 25800030 ZZH RX IP 258 OP 636: Performed by: ORTHOPAEDIC SURGERY

## 2020-07-14 PROCEDURE — 93010 ELECTROCARDIOGRAM REPORT: CPT | Performed by: INTERNAL MEDICINE

## 2020-07-14 PROCEDURE — C1713 ANCHOR/SCREW BN/BN,TIS/BN: HCPCS | Performed by: ORTHOPAEDIC SURGERY

## 2020-07-14 PROCEDURE — 27210794 ZZH OR GENERAL SUPPLY STERILE: Performed by: ORTHOPAEDIC SURGERY

## 2020-07-14 PROCEDURE — 25000128 H RX IP 250 OP 636: Performed by: ORTHOPAEDIC SURGERY

## 2020-07-14 PROCEDURE — 36000063 ZZH SURGERY LEVEL 4 EA 15 ADDTL MIN: Performed by: ORTHOPAEDIC SURGERY

## 2020-07-14 PROCEDURE — 25000128 H RX IP 250 OP 636: Performed by: NURSE ANESTHETIST, CERTIFIED REGISTERED

## 2020-07-14 PROCEDURE — 25000132 ZZH RX MED GY IP 250 OP 250 PS 637: Mod: GY | Performed by: ORTHOPAEDIC SURGERY

## 2020-07-14 PROCEDURE — 25800030 ZZH RX IP 258 OP 636: Performed by: NURSE ANESTHETIST, CERTIFIED REGISTERED

## 2020-07-14 PROCEDURE — 81001 URINALYSIS AUTO W/SCOPE: CPT | Performed by: INTERNAL MEDICINE

## 2020-07-14 PROCEDURE — 36000093 ZZH SURGERY LEVEL 4 1ST 30 MIN: Performed by: ORTHOPAEDIC SURGERY

## 2020-07-14 PROCEDURE — 25000125 ZZHC RX 250: Performed by: PHYSICIAN ASSISTANT

## 2020-07-14 PROCEDURE — 25800025 ZZH RX 258: Performed by: ORTHOPAEDIC SURGERY

## 2020-07-14 PROCEDURE — 40000170 ZZH STATISTIC PRE-PROCEDURE ASSESSMENT II: Performed by: ORTHOPAEDIC SURGERY

## 2020-07-14 PROCEDURE — 12000000 ZZH R&B MED SURG/OB

## 2020-07-14 DEVICE — IMPLANTABLE DEVICE: Type: IMPLANTABLE DEVICE | Site: HIP | Status: FUNCTIONAL

## 2020-07-14 DEVICE — IMPLANTABLE DEVICE
Type: IMPLANTABLE DEVICE | Site: HIP | Status: FUNCTIONAL
Brand: G7® ACETABULAR SYSTEM

## 2020-07-14 DEVICE — IMPLANTABLE DEVICE
Type: IMPLANTABLE DEVICE | Site: HIP | Status: FUNCTIONAL
Brand: TAPERLOC COMPLETE PRIMARY FEMORAL

## 2020-07-14 DEVICE — IMPLANTABLE DEVICE
Type: IMPLANTABLE DEVICE | Site: HIP | Status: FUNCTIONAL
Brand: BIOLOX® DELTA HIP SYSTEM

## 2020-07-14 RX ORDER — LIDOCAINE HYDROCHLORIDE 20 MG/ML
INJECTION, SOLUTION INFILTRATION; PERINEURAL PRN
Status: DISCONTINUED | OUTPATIENT
Start: 2020-07-14 | End: 2020-07-14

## 2020-07-14 RX ORDER — ACETAMINOPHEN 325 MG/1
650 TABLET ORAL EVERY 4 HOURS PRN
Status: DISCONTINUED | OUTPATIENT
Start: 2020-07-17 | End: 2020-07-17 | Stop reason: HOSPADM

## 2020-07-14 RX ORDER — METOCLOPRAMIDE HYDROCHLORIDE 5 MG/ML
5 INJECTION INTRAMUSCULAR; INTRAVENOUS EVERY 6 HOURS PRN
Status: DISCONTINUED | OUTPATIENT
Start: 2020-07-14 | End: 2020-07-17 | Stop reason: HOSPADM

## 2020-07-14 RX ORDER — EPHEDRINE SULFATE 50 MG/ML
INJECTION, SOLUTION INTRAMUSCULAR; INTRAVENOUS; SUBCUTANEOUS PRN
Status: DISCONTINUED | OUTPATIENT
Start: 2020-07-14 | End: 2020-07-14

## 2020-07-14 RX ORDER — NALOXONE HYDROCHLORIDE 0.4 MG/ML
.1-.4 INJECTION, SOLUTION INTRAMUSCULAR; INTRAVENOUS; SUBCUTANEOUS
Status: DISCONTINUED | OUTPATIENT
Start: 2020-07-14 | End: 2020-07-14

## 2020-07-14 RX ORDER — DEXAMETHASONE SODIUM PHOSPHATE 4 MG/ML
INJECTION, SOLUTION INTRA-ARTICULAR; INTRALESIONAL; INTRAMUSCULAR; INTRAVENOUS; SOFT TISSUE PRN
Status: DISCONTINUED | OUTPATIENT
Start: 2020-07-14 | End: 2020-07-14

## 2020-07-14 RX ORDER — FENTANYL CITRATE 50 UG/ML
25-50 INJECTION, SOLUTION INTRAMUSCULAR; INTRAVENOUS
Status: DISCONTINUED | OUTPATIENT
Start: 2020-07-14 | End: 2020-07-14 | Stop reason: HOSPADM

## 2020-07-14 RX ORDER — NALOXONE HYDROCHLORIDE 0.4 MG/ML
.1-.4 INJECTION, SOLUTION INTRAMUSCULAR; INTRAVENOUS; SUBCUTANEOUS
Status: DISCONTINUED | OUTPATIENT
Start: 2020-07-14 | End: 2020-07-17 | Stop reason: HOSPADM

## 2020-07-14 RX ORDER — VANCOMYCIN HYDROCHLORIDE 1 G/20ML
INJECTION, POWDER, LYOPHILIZED, FOR SOLUTION INTRAVENOUS PRN
Status: DISCONTINUED | OUTPATIENT
Start: 2020-07-14 | End: 2020-07-14 | Stop reason: HOSPADM

## 2020-07-14 RX ORDER — ONDANSETRON 4 MG/1
4 TABLET, ORALLY DISINTEGRATING ORAL EVERY 30 MIN PRN
Status: DISCONTINUED | OUTPATIENT
Start: 2020-07-14 | End: 2020-07-14 | Stop reason: HOSPADM

## 2020-07-14 RX ORDER — PROPOFOL 10 MG/ML
INJECTION, EMULSION INTRAVENOUS PRN
Status: DISCONTINUED | OUTPATIENT
Start: 2020-07-14 | End: 2020-07-14

## 2020-07-14 RX ORDER — CLINDAMYCIN PHOSPHATE 900 MG/50ML
900 INJECTION, SOLUTION INTRAVENOUS
Status: COMPLETED | OUTPATIENT
Start: 2020-07-14 | End: 2020-07-14

## 2020-07-14 RX ORDER — TRANEXAMIC ACID 10 MG/ML
1 INJECTION, SOLUTION INTRAVENOUS ONCE
Status: COMPLETED | OUTPATIENT
Start: 2020-07-14 | End: 2020-07-14

## 2020-07-14 RX ORDER — CLINDAMYCIN PHOSPHATE 900 MG/50ML
900 INJECTION, SOLUTION INTRAVENOUS EVERY 8 HOURS
Status: COMPLETED | OUTPATIENT
Start: 2020-07-14 | End: 2020-07-15

## 2020-07-14 RX ORDER — ONDANSETRON 2 MG/ML
4 INJECTION INTRAMUSCULAR; INTRAVENOUS EVERY 30 MIN PRN
Status: DISCONTINUED | OUTPATIENT
Start: 2020-07-14 | End: 2020-07-14 | Stop reason: HOSPADM

## 2020-07-14 RX ORDER — SODIUM CHLORIDE, SODIUM LACTATE, POTASSIUM CHLORIDE, CALCIUM CHLORIDE 600; 310; 30; 20 MG/100ML; MG/100ML; MG/100ML; MG/100ML
INJECTION, SOLUTION INTRAVENOUS CONTINUOUS
Status: DISCONTINUED | OUTPATIENT
Start: 2020-07-14 | End: 2020-07-14 | Stop reason: HOSPADM

## 2020-07-14 RX ORDER — METOCLOPRAMIDE 5 MG/1
5 TABLET ORAL EVERY 6 HOURS PRN
Status: DISCONTINUED | OUTPATIENT
Start: 2020-07-14 | End: 2020-07-17 | Stop reason: HOSPADM

## 2020-07-14 RX ORDER — ACETAMINOPHEN 325 MG/1
975 TABLET ORAL EVERY 8 HOURS
Status: DISCONTINUED | OUTPATIENT
Start: 2020-07-14 | End: 2020-07-17 | Stop reason: HOSPADM

## 2020-07-14 RX ORDER — SODIUM CHLORIDE, SODIUM LACTATE, POTASSIUM CHLORIDE, CALCIUM CHLORIDE 600; 310; 30; 20 MG/100ML; MG/100ML; MG/100ML; MG/100ML
INJECTION, SOLUTION INTRAVENOUS CONTINUOUS
Status: DISCONTINUED | OUTPATIENT
Start: 2020-07-14 | End: 2020-07-17 | Stop reason: HOSPADM

## 2020-07-14 RX ORDER — IBUPROFEN 600 MG/1
600 TABLET, FILM COATED ORAL EVERY 6 HOURS PRN
Status: DISCONTINUED | OUTPATIENT
Start: 2020-07-14 | End: 2020-07-17 | Stop reason: HOSPADM

## 2020-07-14 RX ORDER — HYDROMORPHONE HYDROCHLORIDE 1 MG/ML
.3-.5 INJECTION, SOLUTION INTRAMUSCULAR; INTRAVENOUS; SUBCUTANEOUS EVERY 5 MIN PRN
Status: DISCONTINUED | OUTPATIENT
Start: 2020-07-14 | End: 2020-07-14 | Stop reason: HOSPADM

## 2020-07-14 RX ORDER — CLINDAMYCIN PHOSPHATE 900 MG/50ML
900 INJECTION, SOLUTION INTRAVENOUS SEE ADMIN INSTRUCTIONS
Status: DISCONTINUED | OUTPATIENT
Start: 2020-07-14 | End: 2020-07-14 | Stop reason: HOSPADM

## 2020-07-14 RX ORDER — AMOXICILLIN 250 MG
2 CAPSULE ORAL 2 TIMES DAILY
Status: DISCONTINUED | OUTPATIENT
Start: 2020-07-14 | End: 2020-07-17 | Stop reason: HOSPADM

## 2020-07-14 RX ORDER — PROCHLORPERAZINE MALEATE 5 MG
5 TABLET ORAL EVERY 6 HOURS PRN
Status: DISCONTINUED | OUTPATIENT
Start: 2020-07-14 | End: 2020-07-17 | Stop reason: HOSPADM

## 2020-07-14 RX ORDER — TRANEXAMIC ACID 650 MG/1
1950 TABLET ORAL ONCE
Status: DISCONTINUED | OUTPATIENT
Start: 2020-07-14 | End: 2020-07-14 | Stop reason: HOSPADM

## 2020-07-14 RX ORDER — LIDOCAINE 40 MG/G
CREAM TOPICAL
Status: DISCONTINUED | OUTPATIENT
Start: 2020-07-14 | End: 2020-07-17 | Stop reason: HOSPADM

## 2020-07-14 RX ORDER — FENTANYL CITRATE 50 UG/ML
INJECTION, SOLUTION INTRAMUSCULAR; INTRAVENOUS PRN
Status: DISCONTINUED | OUTPATIENT
Start: 2020-07-14 | End: 2020-07-14

## 2020-07-14 RX ORDER — HYDROMORPHONE HYDROCHLORIDE 1 MG/ML
0.2 INJECTION, SOLUTION INTRAMUSCULAR; INTRAVENOUS; SUBCUTANEOUS
Status: DISCONTINUED | OUTPATIENT
Start: 2020-07-14 | End: 2020-07-17 | Stop reason: HOSPADM

## 2020-07-14 RX ORDER — POLYETHYLENE GLYCOL 3350 17 G/17G
17 POWDER, FOR SOLUTION ORAL DAILY
Status: DISCONTINUED | OUTPATIENT
Start: 2020-07-14 | End: 2020-07-17 | Stop reason: HOSPADM

## 2020-07-14 RX ORDER — BISACODYL 10 MG
10 SUPPOSITORY, RECTAL RECTAL DAILY PRN
Status: DISCONTINUED | OUTPATIENT
Start: 2020-07-14 | End: 2020-07-17 | Stop reason: HOSPADM

## 2020-07-14 RX ORDER — NEOSTIGMINE METHYLSULFATE 1 MG/ML
VIAL (ML) INJECTION PRN
Status: DISCONTINUED | OUTPATIENT
Start: 2020-07-14 | End: 2020-07-14

## 2020-07-14 RX ORDER — MAGNESIUM HYDROXIDE 1200 MG/15ML
LIQUID ORAL PRN
Status: DISCONTINUED | OUTPATIENT
Start: 2020-07-14 | End: 2020-07-14 | Stop reason: HOSPADM

## 2020-07-14 RX ORDER — BIOTIN 1 MG
500 TABLET ORAL DAILY
COMMUNITY

## 2020-07-14 RX ORDER — ONDANSETRON 2 MG/ML
INJECTION INTRAMUSCULAR; INTRAVENOUS PRN
Status: DISCONTINUED | OUTPATIENT
Start: 2020-07-14 | End: 2020-07-14

## 2020-07-14 RX ORDER — GLYCOPYRROLATE 0.2 MG/ML
INJECTION, SOLUTION INTRAMUSCULAR; INTRAVENOUS PRN
Status: DISCONTINUED | OUTPATIENT
Start: 2020-07-14 | End: 2020-07-14

## 2020-07-14 RX ADMIN — ACETAMINOPHEN 975 MG: 325 TABLET, FILM COATED ORAL at 18:00

## 2020-07-14 RX ADMIN — TRANEXAMIC ACID 1 G: 10 INJECTION, SOLUTION INTRAVENOUS at 13:46

## 2020-07-14 RX ADMIN — PHENYLEPHRINE HYDROCHLORIDE 100 MCG: 10 INJECTION INTRAVENOUS at 13:31

## 2020-07-14 RX ADMIN — MIDAZOLAM HYDROCHLORIDE 1 MG: 1 INJECTION, SOLUTION INTRAMUSCULAR; INTRAVENOUS at 12:03

## 2020-07-14 RX ADMIN — MORPHINE SULFATE 2 MG: 2 INJECTION, SOLUTION INTRAMUSCULAR; INTRAVENOUS at 10:49

## 2020-07-14 RX ADMIN — FENTANYL CITRATE 50 MCG: 50 INJECTION, SOLUTION INTRAMUSCULAR; INTRAVENOUS at 12:58

## 2020-07-14 RX ADMIN — FENTANYL CITRATE 50 MCG: 50 INJECTION, SOLUTION INTRAMUSCULAR; INTRAVENOUS at 13:22

## 2020-07-14 RX ADMIN — CLINDAMYCIN IN 5 PERCENT DEXTROSE 900 MG: 18 INJECTION, SOLUTION INTRAVENOUS at 21:08

## 2020-07-14 RX ADMIN — NEOSTIGMINE METHYLSULFATE 2.5 MG: 1 INJECTION, SOLUTION INTRAVENOUS at 14:24

## 2020-07-14 RX ADMIN — PHENYLEPHRINE HYDROCHLORIDE 100 MCG: 10 INJECTION INTRAVENOUS at 13:01

## 2020-07-14 RX ADMIN — SODIUM CHLORIDE, POTASSIUM CHLORIDE, SODIUM LACTATE AND CALCIUM CHLORIDE: 600; 310; 30; 20 INJECTION, SOLUTION INTRAVENOUS at 14:21

## 2020-07-14 RX ADMIN — PHENYLEPHRINE HYDROCHLORIDE 100 MCG: 10 INJECTION INTRAVENOUS at 14:41

## 2020-07-14 RX ADMIN — SODIUM CHLORIDE: 9 INJECTION, SOLUTION INTRAVENOUS at 00:18

## 2020-07-14 RX ADMIN — MORPHINE SULFATE 1 MG: 2 INJECTION, SOLUTION INTRAMUSCULAR; INTRAVENOUS at 05:12

## 2020-07-14 RX ADMIN — SODIUM CHLORIDE, POTASSIUM CHLORIDE, SODIUM LACTATE AND CALCIUM CHLORIDE: 600; 310; 30; 20 INJECTION, SOLUTION INTRAVENOUS at 11:29

## 2020-07-14 RX ADMIN — ROCURONIUM BROMIDE 35 MG: 10 INJECTION INTRAVENOUS at 12:58

## 2020-07-14 RX ADMIN — Medication 10 MG: at 14:12

## 2020-07-14 RX ADMIN — MORPHINE SULFATE 1 MG: 2 INJECTION, SOLUTION INTRAMUSCULAR; INTRAVENOUS at 01:35

## 2020-07-14 RX ADMIN — Medication 10 MG: at 13:40

## 2020-07-14 RX ADMIN — DEXAMETHASONE SODIUM PHOSPHATE 4 MG: 4 INJECTION, SOLUTION INTRA-ARTICULAR; INTRALESIONAL; INTRAMUSCULAR; INTRAVENOUS; SOFT TISSUE at 13:25

## 2020-07-14 RX ADMIN — ONDANSETRON 4 MG: 2 INJECTION INTRAMUSCULAR; INTRAVENOUS at 14:12

## 2020-07-14 RX ADMIN — MORPHINE SULFATE 2 MG: 2 INJECTION, SOLUTION INTRAMUSCULAR; INTRAVENOUS at 08:54

## 2020-07-14 RX ADMIN — OXYCODONE HYDROCHLORIDE 2.5 MG: 5 TABLET ORAL at 22:23

## 2020-07-14 RX ADMIN — PROPOFOL 130 MG: 10 INJECTION, EMULSION INTRAVENOUS at 12:58

## 2020-07-14 RX ADMIN — LIDOCAINE HYDROCHLORIDE 100 MG: 20 INJECTION, SOLUTION INFILTRATION; PERINEURAL at 12:58

## 2020-07-14 RX ADMIN — MIDAZOLAM 1 MG: 1 INJECTION INTRAMUSCULAR; INTRAVENOUS at 12:55

## 2020-07-14 RX ADMIN — MIDAZOLAM 1 MG: 1 INJECTION INTRAMUSCULAR; INTRAVENOUS at 12:52

## 2020-07-14 RX ADMIN — GLYCOPYRROLATE 0.4 MG: 0.2 INJECTION, SOLUTION INTRAMUSCULAR; INTRAVENOUS at 14:24

## 2020-07-14 RX ADMIN — ASPIRIN 325 MG: 325 TABLET, COATED ORAL at 18:00

## 2020-07-14 RX ADMIN — PHENYLEPHRINE HYDROCHLORIDE 100 MCG: 10 INJECTION INTRAVENOUS at 14:03

## 2020-07-14 RX ADMIN — ACETAMINOPHEN 650 MG: 325 TABLET, FILM COATED ORAL at 00:18

## 2020-07-14 RX ADMIN — CLINDAMYCIN PHOSPHATE 900 MG: 900 INJECTION, SOLUTION INTRAVENOUS at 13:15

## 2020-07-14 ASSESSMENT — ACTIVITIES OF DAILY LIVING (ADL)
ADLS_ACUITY_SCORE: 11
ADLS_ACUITY_SCORE: 13
ADLS_ACUITY_SCORE: 11
ADLS_ACUITY_SCORE: 13

## 2020-07-14 ASSESSMENT — ENCOUNTER SYMPTOMS
DYSRHYTHMIAS: 0
SEIZURES: 0

## 2020-07-14 ASSESSMENT — LIFESTYLE VARIABLES: TOBACCO_USE: 1

## 2020-07-14 NOTE — H&P
[facility]    History and Physical  Hospitalist       Date of Admission:  7/13/2020    Assessment & Plan   75 year old healthy female, who presents with fall and right hip pain:    Summary:    Principal Problem:    Right Fem Neck Hip Fx    -- ortho consult, anticipate ORIF in AM    Active Problems:    Leukocytosis -- suspect stress related, no sign of infeciton   -- will check UA       Plan:  NPO after midnight, will get EKG.  No medical contraindications to proceeding with surgery.     DVT Prophylaxis: Pneumatic Compression Devices  Code Status: Full Code    Disposition: Expected discharge in 3 days  Yovany Khan MD  Pager: [phone]  Cell Phone:  [phone]     Primary Care Physician   Sun Virgen MD    Chief Complaint   Right hip pain    History is obtained from Patient    History of Present Illness   75 year old female who presents for evaluation of mild right hip pain following a fall. The patient reports that she tripped and fell in the kitchen at about 1930. She did not hit her head or lose consciousness and her  was present at the time of the fall. The pain is localized in the right hip and does not radiate to the lower leg, left leg, arms, or back. She currently cannot put weight on it however does not experience any pain while sitting. The patient does note that her right leg feels shorter than her left leg.     In ER, afeb, vital signs stable, WBC 17.7, hgb 14.1, right hip xray with right fem neck facture.    PAST MEDICAL HISTORY    Past Medical History:   Diagnosis Date     Anxiety state, unspecified      Breast screening, unspecified      Insomnia, unspecified      Major depressive disorder, single episode, unspecified      Osteoporosis, unspecified      Unspecified menopausal and postmenopausal disorder         PAST SURGICAL HISTORY    Past Surgical History:   Procedure Laterality Date     CLOSED RX PROX HUMERUS FRACTURE Right 2010     ENT SURGERY       oral surgery 2014     GYN SURGERY           OPEN REDUCTION INTERNAL FIXATION ANKLE Right         Prior to Admission Medications   Prior to Admission Medications   Prescriptions Last Dose Informant Patient Reported? Taking?   Multiple Vitamin (MULTI VITAMIN DAILY PO)   Yes No   Sig: Take by mouth daily   escitalopram (LEXAPRO) 5 MG tablet   Yes No   Sig: Take by mouth daily 1/2 tab daily.      Facility-Administered Medications: None     Allergies   Allergies   Allergen Reactions     Strawberry      Penicillins Rash       SOCIAL HISTORY    Social History     Social History Narrative    , one daughter, lives with .  (last updated 2020)       Social History     Tobacco Use     Smoking status: Former Smoker     Packs/day: 0.10     Years: 2.00     Pack years: 0.20     Types: Cigarettes     Start date:      Last attempt to quit:      Years since quittin.5     Smokeless tobacco: Never Used   Substance Use Topics     Alcohol use: Yes     Comment: 2-4 drinks per week     Drug use: No        FAMILY HISTORY    Family History   Problem Relation Age of Onset     Other - See Comments Mother 95        Old age     Lymphoma Mother      Prostate Cancer Father         Review of Systems   The 10 point Review of Systems is negative other than noted in the HPI or here.       PHYSICAL EXAM     Temp: 99.7  F (37.6  C) Temp src: Oral BP: 113/57 Pulse: 80   Resp: 20 SpO2: 95 % O2 Device: None (Room air)    Vital Signs with Ranges  Temp:  [98.2  F (36.8  C)-99.7  F (37.6  C)] 99.7  F (37.6  C)  Pulse:  [78-85] 80  Resp:  [20] 20  BP: (113-142)/(57-79) 113/57  SpO2:  [95 %-98 %] 95 %  119 lbs 11.36 oz    Constitutional: Awake, alert, cooperative, no apparent distress.  Eyes: Conjunctiva and pupils examined and normal.  HEENT: Moist mucous membranes, normal dentition.  Respiratory: Clear to auscultation bilaterally, no crackles or wheezing.  Cardiovascular: Regular rate and rhythm, normal S1 and S2,  and no murmur noted, no carotid bruits.  No ankle edema.   GI: Soft, non-distended, non-tender, normal bowel sounds.  Lymph/Hematologic: No anterior cervical, supraclavicular or axillary adenopathy.  Skin: No rashes, no cyanosis.   Musculoskeletal: No joint swelling, erythema or tenderness.  Neurologic: Alert, Ox3, Cranial nerves 2-12 intact, no focal weakness or numbness  Psychiatric:  No obvious anxiety or depression.    Data   Data reviewed today:  I personally reviewed the EKG tracing showing none.  Recent Labs   Lab 07/13/20  2220   WBC 17.7*   HGB 14.1   MCV 87      INR 1.02      POTASSIUM 4.6   CHLORIDE 107   CO2 29   BUN 15   CR 0.83   ANIONGAP 3   TERENCE 10.4*   *       Imaging:  Recent Results (from the past 24 hour(s))   XR Pelvis w Hip Right 1 View    Narrative    Examination:  XR PELVIS AND HIP RIGHT 1 VIEW    Date:  7/13/2020 9:30 PM     Clinical Information: Right hip pain after a fall.     Additional Information: none    Comparison: none      Impression    Impression:  1.  Acute right femoral neck fracture, moderately displaced. The  greater trochanters appear intact. No additional fracture in the  pelvis or left hip.  2.  Both hip joints remain normally aligned, with maintained joint  spacing.  3.  Unremarkable lower lumbar spine and SI joints.    JC FLOWER MD   XR Chest 1 View    Narrative    XR CHEST 1 VW 7/13/2020 9:30 PM    HISTORY: Right Hip fracture    COMPARISON: None.      Impression    IMPRESSION: Pulmonary hyperinflation. No acute findings. The lungs are  grossly clear  No definite pleural effusions. Normal heart size. Right proximal  humerus orthopedic fixation hardware.    ATIYA NEELY MD

## 2020-07-14 NOTE — PLAN OF CARE
Pt A&O x4. Arrived to floor at 2330. VSS on RA. Pain managed w/ prn morphine. On bedrest. Turn/repo. Gagnon placed per order. NPO since midnight. To be seen by ortho. Will continue to monitor.

## 2020-07-14 NOTE — PROGRESS NOTES
RECEIVING UNIT ED HANDOFF REVIEW    ED Nurse Handoff Report was reviewed by: Angela Thibodeaux RN on July 13, 2020 at 11:07 PM

## 2020-07-14 NOTE — PHARMACY-ADMISSION MEDICATION HISTORY
Pharmacy Medication History  Admission medication history interview status for the 7/13/2020  admission is complete. See EPIC admission navigator for prior to admission medications     Medication history sources: Patient, Surescripts and Care Everywhere  Medication history source reliability: Good  Adherence assessment: Good    Significant changes made to the medication list:  - Added Biotin supplement    Additional medication history information:   - Lexapro prescription appears to be written for 5 mg daily, but patient states she takes 1/2 tablet (2.5 mg) daily.    Medication reconciliation completed by provider prior to medication history? No    Time spent in this activity: 10 minutes      Prior to Admission medications    Medication Sig Last Dose Taking? Auth Provider   biotin 1000 MCG TABS tablet Take 500 mcg by mouth daily 7/13/2020 at Unknown time Yes Unknown, Entered By History   escitalopram (LEXAPRO) 5 MG tablet Take 2.5 mg by mouth daily  7/13/2020 at Unknown time Yes Reported, Patient   Multiple Vitamin (MULTI VITAMIN DAILY PO) Take 1 tablet by mouth daily  7/13/2020 at Unknown time Yes Reported, Patient

## 2020-07-14 NOTE — PLAN OF CARE
[facility]    Orthopedics Preliminary Consult Note    Chart and imaging reviewed.    Dorothea Dugan is a 75 year old female with right displaced subcapital femoral neck fracture after ground level fall..    Indicated for total hip arthroplasty given age and activity level.    Will evaluate patient today.    Plan for OR @ 1230pm today.    Full consult note to follow.    Please call with questions.    Luis Felipe Christopher MD  Orthopedic Trauma and Arthroplasty  [facility]  [phone]

## 2020-07-14 NOTE — OP NOTE
[facility]  Orthopedic Operative Note    Posterior Approach Total Hip Arthroplasty      Dorothea Dugan MRN# [id number]   YOB: 1945  Procedure Date:  7/14/2020  Age: 75 year old     PREOPERATIVE DIAGNOSIS: Right displaced subcapital femoral neck fracture    POSTOPERATIVE DIAGNOSIS: Right displaced subcapital femoral neck fracture    PROCEDURE PERFORMED:  right total hip arthroplasty.      SURGEON:  Luis Felipe Christopher MD    FIRST ASSISTANT:  Trina Barba PA-C. Her assistance was critical during transfer, positioning, preparation, draping, surgical approach, preparation of femoral canal and acetabulum, implant placement, closure and placement of dressings. Her assistance allowed me to operate efficiently, decreasing surgical time and risk.     ANESTHESIA:   General    INDICATIONS:  Dorothea Dugan  is a 75 year old-year-old female with displaced subcapital femoral neck fracture of right hip.  Discussed both operative and nonoperative management.  Risks of surgery discussed included but not limited to bleeding, infection, damage to surrounding neurovascular structures, leg length inequality, dislocation, periprosthetic fracture, need for revision surgery, blood clots, pulmonary embolus, stroke, anesthetic complications and even death.  No guarantees given or implied. Patient verbalizes and acknowledges risks and wishes to proceed. This procedure has been fully reviewed with the patient and written informed consent has been obtained.     IMPLANTS:  Implant Name Type Inv. Item Serial No.  Lot No. LRB No. Used Action   IMP SHELL BIOM G7 ACETAB PPS CARR HOLE 56MM SZ  [id number] Total Joint Component/Insert IMP SHELL BIOM G7 ACETAB PPS CARR HOLE 56MM [facility] [id number]  [employer] [id number] Right 1 Implanted   IMP SCR ZIM 6.5X40MM ACET CUP SELF TAP -106-40 Metallic Hardware/[place] IMP SCR ZIM 6.5X40MM ACET CUP SELF TAP -259-40  [employer] [id number] Right 1 Implanted    IMP SCR ZIM 6.5X20MM ACET CUP SELF TAP -752-20 Metallic Hardware/[place] IMP SCR ZIM 6.5X20MM ACET CUP SELF TAP -766-20  [employer] [id number] Right 1 Implanted   BIOMET G7 ACETABULAR LINER NEUTRAL. 44MM BEARING SIZE, F LINER SIZE     BIOMET [id number] Right 1 Implanted   IMP STEM FEM BIOM TAPERLOC STD OFFSET TYPE 1 SZ14 [id number] Total Joint Component/Insert IMP STEM FEM BIOM TAPERLOC STD OFFSET TYPE 1 SZ14 [id number]  [employer] [id number] Right 1 Implanted   BIOMET ACTIVE ARTICULTION HIP, DUAL MOBILITY BEARING. 28M HEAD SIZE, 44MM BEARING SIZE     BIOMET [id number] Right 1 Implanted   BIOMET BIOLOX DELTA HIP; MODULAR CERAMIC HEAD     BIOMET [id number] Right 1 Implanted       PROCEDURE:  Patient identified in preoperative holding area and the operative site marked with indelible marker. Brought to operating room and transferred to operating room table. Underwent induction of general anesthesia. Subsequently turned to the left lateral decubitus position with axillary roll and all bony prominences well padded.  Chlorohexidine prescrub performed and the right hip was prepped with Chloroprep and draped in the usual sterile fashion.  A posterolateral incision was made.  Dissection was carried through the iliotibial band.  The piriformis was isolated, tagged with #2 Ethibond for retraction and preserved.  The external rotators and capsule were identified, tagged, divided, and preserved for later repair. Proximal portion of quadratus femoris release. Medial femoral circumflex artery cauterized. Posterolateral release carried down to the level of the lesser trochanter. We were then able to dislocate the hip. A neck cut was made maintaining 1 cm of femoral neck proximal to lesser trochanter. The femoral head and neck were removed. We then turned our attention to the acetabulum. Appropriate retractors were place to expose the acetabulum. Labrum and pulvinar were excised. Acetabulum medialized and sequentially  reamed until snug fir of reamer head obtained. Cup impacted and 2 screws placed.  Trial liner inserted. We then turned our attention to the femur. The leg was placed in combined adduction, internal rotation and flexion. The femoral canal was then opened with box osteotome, canal finder, and then lateralizer. Femoral canal sequentially broached to accept a stem trial.  A trial head was placed and the hip joint was reduced. After trial reduction, leg lengths were equal by palpation and and the hip was stable with extension and adduction as well as flexion, adduction and internal rotation. There was appropriate shuck.  Intraoperative portable AP pelvis x-ray showed appropriate component sizes. The hip was dislocated and the the final liner and stem was inserted after copiuos irrigation and head was implanted on cleaned and dried walker taper.  The hip was reduced and again found to be stable. The wound was was washed with Rush betadine protocol (17cc 10% Betadine in 500cc Sterile Saline) and then irrigated with antibiotic irrigating solution.  The piriformis and short external rotators and capsule were reattached to the trochanter through  drill holes.  The wound was then irrigated thoroughly and closed over 1 g of vancomycin powder in layers over drained with #1 Vicryl, 2-0 Vicryl, 3-0 Monocryl and dermabond.  A sterile dressing was applied with Aquacel.  There were no intraoperative complications and patient tolerated procedure well. Sponge and needle counts were correct and the patient was returned to the recovery room in stable condition.    Post Op Plan:  1.  WBAT with Posterior hip precautions x 6 weeks  2.  Hip abduction pillow when sleeping x 6 weeks  3.  Antibiotics x 24 hours  4.  DVT prophylaxis with SCDs and ASA EC 325mg PO once/day for 4 weeks  5.  Keep Aquacel dressing intact x 2 weeks. OK to shower with dressing in-place.  6.  PACU x-rays AP Pelvis and right hip lateral    Follow Up:  1.  2 week wound  check with AP pelvis and cross table lateral hip  2.  6 weeks with AP pelvis and cross table lateral right hip    Luis Felipe Christopher MD  Orthopedic Trauma and Arthroplasty  [facility]  [phone]

## 2020-07-14 NOTE — ED NOTES
"[facility]  ED Nurse Handoff Report    ED Chief complaint: Hip right      ED Diagnosis:   Final diagnoses:   Closed displaced fracture of right femoral neck (H)       Code Status: To be addressed by admitting MD.     Allergies:   Allergies   Allergen Reactions     Strawberry      Penicillins Rash       Patient Story: Pt presents following fall at home for evaluation of right hip pain.   Focused Assessment:  Pt has a fractured right hip. Morphine for pain control in ED.  at bedside in ED. Purewick in place in ED.     Treatments and/or interventions provided:    Medications   ondansetron (ZOFRAN) injection 4 mg (4 mg Intravenous Given 7/13/20 2222)   morphine (PF) injection 4 mg (4 mg Intravenous Given 7/13/20 2222)       Patient's response to treatments and/or interventions: Decreased pain.   To be done/followed up on inpatient unit:  Ortho consult     Does this patient have any cognitive concerns?: NA    Activity level - Baseline/Home:  Independent  Activity Level - Current:   Total Care    Patient's Preferred language: English   Needed?: No    Isolation: None  Infection: Not Applicable  Bariatric?: No    Vital Signs:   Vitals:    07/13/20 2042 07/13/20 2100 07/13/20 2145   BP: (!) 142/79     Pulse: 85     Resp: 20     Temp: 98.2  F (36.8  C)     TempSrc: Oral     SpO2: 96% 98% 97%   Weight: 54.3 kg (119 lb 11.4 oz)     Height: 1.651 m (5' 5\")         Cardiac Rhythm:     Was the PSS-3 completed:   Yes  What interventions are required if any?               Family Comments:  at bedside in ED.   OBS brochure/video discussed/provided to patient/family: N/A              Name of person given brochure if not patient: NA              Relationship to patient: NA    For the majority of the shift this patient's behavior was Green.   Behavioral interventions performed were frequent rounding, pain control and encouragement.    ED NURSE PHONE NUMBER: [phone]           "

## 2020-07-14 NOTE — PROGRESS NOTES
Care Transitions Team: Following for CC, discharge planning, and disposition.       Per PAULINE Steiner Liaison Handoff:   Writer spoke with patient's  Ed via phone regarding discharge plan.   Living situation:   Support:  (Ed) is retired and will be available 24/7 as needed, able to provide SBA.    Available transportation:  Ed will provide   Home environment:    Stairs-had rails ? 2 steps to enter home from garage with grab bar.     Equipment available :  Dorothea has been sleeping on the couch on the  main level.There are approximately  13-14 steps to upper level where bedrooms, tub and shower are located.    Increase service or equipment needs: will need a walker and therapy to assess for any additional equipment needs.    Prior Function level:   Ambulation - No assistive devices   ADL's Independent    Current home care services: No    Family/patient discharge goal: Home if safe and able to manage steps.   Barriers to Discharge:  2 steps to enter home with grab bar.  1 flight of steps to bedroom, shower and bath area.    Discharge Plan of care: Home with [facility] - PT, OT and RN   TCU - Medicare compare TCU list provided - CM discussed Medicare compare website and star ratings. Backup plan if unable to discharge home:   1.  [facility]  2.  [facility]  Orders needed for services: Post Op Plan of Care - Home with Home Care [facility] if needed and recommended by PT dominickal  Transportation:  Ed will provide        Will follow in collaboration with [facility] SUDEEP Rivera -[phone] [facility] for discharge planning.

## 2020-07-14 NOTE — ANESTHESIA PREPROCEDURE EVALUATION
Anesthesia Pre-Procedure Evaluation    Patient: Dorothea Dugan   MRN: [id number] : 1945          Preoperative Diagnosis: Hip fracture (H) [S72.009A]    Procedure(s):  RIGHT HEMIARTHROPLASTY -VS-TOTAL HIP ARTHROPLASTY.    Past Medical History:   Diagnosis Date     Anxiety state, unspecified      Breast screening, unspecified      Insomnia, unspecified      Major depressive disorder, single episode, unspecified      Osteoporosis, unspecified      Unspecified menopausal and postmenopausal disorder      Past Surgical History:   Procedure Laterality Date     CLOSED RX PROX HUMERUS FRACTURE Right      ENT SURGERY      oral surgery 2014     GYN SURGERY           OPEN REDUCTION INTERNAL FIXATION ANKLE Right        Anesthesia Evaluation     .             ROS/MED HX    ENT/Pulmonary:     (+)tobacco use, Past use , . .    Neurologic:      (-) seizures and CVA   Cardiovascular:        (-) hypertension and arrhythmias   METS/Exercise Tolerance:     Hematologic:         Musculoskeletal:   (+) fracture lower extremity: Hip, -       GI/Hepatic:        (-) GERD and liver disease   Renal/Genitourinary:      (-) renal disease   Endo:      (-) Type II DM and thyroid disease   Psychiatric:     (+) psychiatric history anxiety and depression      Infectious Disease:         Malignancy:         Other:                          Physical Exam      Airway   Mallampati: II  TM distance: >3 FB  Neck ROM: full    Dental   (+) upper dentures and lower dentures    Cardiovascular   Rhythm and rate: regular      Pulmonary             Lab Results   Component Value Date    WBC 17.7 (H) 2020    HGB 14.1 2020    HCT 41.1 2020     2020     2020    POTASSIUM 4.6 2020    CHLORIDE 107 2020    CO2 29 2020    BUN 15 2020    CR 0.83 2020     (H) 2020    TERENCE 10.4 (H) 2020    INR 1.02 2020       Preop Vitals  BP Readings from Last 3  "Encounters:   07/14/20 120/72   12/04/18 138/62   09/10/18 152/79    Pulse Readings from Last 3 Encounters:   07/14/20 81   12/04/18 96   12/15/14 86      Resp Readings from Last 3 Encounters:   07/14/20 16   09/10/18 16   12/15/14 16    SpO2 Readings from Last 3 Encounters:   07/14/20 95%   09/10/18 97%   12/15/14 98%      Temp Readings from Last 1 Encounters:   07/14/20 37.4  C (99.3  F) (Oral)    Ht Readings from Last 1 Encounters:   07/13/20 1.651 m (5' 5\")      Wt Readings from Last 1 Encounters:   07/13/20 54.3 kg (119 lb 11.4 oz)    Estimated body mass index is 19.92 kg/m  as calculated from the following:    Height as of this encounter: 1.651 m (5' 5\").    Weight as of this encounter: 54.3 kg (119 lb 11.4 oz).       Anesthesia Plan      History & Physical Review  History and physical reviewed and following examination; no interval change.    ASA Status:  2 .    NPO Status:  > 8 hours    Plan for General (LMA) with Propofol induction.   PONV prophylaxis:  Ondansetron (or other 5HT-3) and Dexamethasone or Solumedrol         Postoperative Care  Postoperative pain management:  IV analgesics and Oral pain medications.      Consents  Anesthetic plan, risks, benefits and alternatives discussed with:  Patient..                 Matt Cedillo MD  "

## 2020-07-14 NOTE — PLAN OF CARE
Arrived at 1630 from PACU. VSS, CMS intact. Pain managed with tylenol. Morphine and oxycodone avail. Dressing C/D/I. Dry/flaky skin. Alert and orient x4.

## 2020-07-14 NOTE — CONSULTS
[facility]    Orthopedic Consultation    Dorothea Dugan MRN# [id number]   Age: 75 year old YOB: 1945     Date of Admission:  7/13/2020    Reason for consult: Right hip fracture       Requesting physician: Dr. Lemons       Level of consult: Consult, follow and place orders           Assessment and Plan:   Assessment:   Right femoral neck fracture      Plan:   Plan for OR today for right hip total arthroplasty with Dr. Christopher  Bed rest  NPO  COVID testing done and negative  Type and cross completed  Pain medication as needed  Pre-op optimization per hospitalist  Spoke at length with  at bedside and discussed with [facility] coordinator to discuss options post-operatively for discharge in 2-3 days           Chief Complaint:   Right hip pain after fall from standing         History of Present Illness:   This patient is a 75 year old female who presents with the following condition requiring a hospital admission:    The patient reports that she tripped and fell in the kitchen at about 1930. She did not hit her head or lose consciousness and her  was present at the time of the fall. The pain is localized in the right hip and does not radiate to the lower leg, left leg, arms, or back. She currently cannot put weight on it however she does not experience any pain while sitting. The patient does note that her right leg feels shorter than her left leg.   She is not chronically anticoagulated.  She has had prior surgeries, right shoulder and left ankle with Dr. Norrsi.           Past Medical History:     Past Medical History:   Diagnosis Date     Anxiety state, unspecified      Breast screening, unspecified      Insomnia, unspecified      Major depressive disorder, single episode, unspecified      Osteoporosis, unspecified      Unspecified menopausal and postmenopausal disorder              Past Surgical History:     Past Surgical History:   Procedure Laterality Date     CLOSED RX  PROX HUMERUS FRACTURE Right      ENT SURGERY      oral surgery 2014     GYN SURGERY           OPEN REDUCTION INTERNAL FIXATION ANKLE Right              Social History:     Social History     Tobacco Use     Smoking status: Former Smoker     Packs/day: 0.10     Years: 2.00     Pack years: 0.20     Types: Cigarettes     Start date:      Last attempt to quit:      Years since quittin.5     Smokeless tobacco: Never Used   Substance Use Topics     Alcohol use: Yes     Comment: 2-4 drinks per week             Family History:     Family History   Problem Relation Age of Onset     Other - See Comments Mother 95        Old age     Lymphoma Mother      Prostate Cancer Father              Immunizations:     VACCINE/DOSE   Diptheria   DPT   DTAP   HBIG   Hepatitis A   Hepatitis B   HIB   Influenza   Measles   Meningococcal   MMR   Mumps   Pneumococcal   Polio   Rubella   Small Pox   TDAP   Varicella   Zoster             Allergies:     Allergies   Allergen Reactions     Strawberry      Penicillins Rash             Medications:     Current Facility-Administered Medications   Medication     acetaminophen (TYLENOL) tablet 650 mg     docusate sodium (COLACE) capsule 100 mg     HYDROcodone-acetaminophen (NORCO) 5-325 MG per tablet 1-2 tablet     lactated ringers infusion     lidocaine (LMX4) cream     lidocaine 1 % 0.1-1 mL     lidocaine 1 % 0.1-1 mL     melatonin tablet 1 mg     morphine (PF) injection 1-2 mg     naloxone (NARCAN) injection 0.1-0.4 mg     ondansetron (ZOFRAN-ODT) ODT tab 4 mg    Or     ondansetron (ZOFRAN) injection 4 mg     polyethylene glycol (MIRALAX) Packet 17 g     sodium chloride (PF) 0.9% PF flush 3 mL     sodium chloride (PF) 0.9% PF flush 3 mL     sodium chloride 0.9% infusion             Review of Systems:   CV: NEGATIVE for chest pain, palpitations or peripheral edema  C: NEGATIVE for fever, chills, change in weight  E/M: NEGATIVE for ear, mouth and throat problems  R:  "NEGATIVE for significant cough or SOB          Physical Exam:   All vitals have been reviewed  Patient Vitals for the past 24 hrs:   BP Temp Temp src Pulse Resp SpO2 Height Weight   07/14/20 0912 -- -- -- -- 16 -- -- --   07/14/20 0854 -- -- -- -- 18 -- -- --   07/14/20 0751 120/72 99.3  F (37.4  C) Oral 81 16 95 % -- --   07/14/20 0530 -- -- -- -- 16 -- -- --   07/14/20 0512 -- -- -- -- 16 -- -- --   07/14/20 0150 -- -- -- -- 16 -- -- --   07/14/20 0135 -- -- -- -- 16 -- -- --   07/13/20 2335 113/57 99.7  F (37.6  C) Oral 80 20 95 % -- --   07/13/20 2245 -- -- -- -- -- 97 % -- --   07/13/20 2230 -- -- -- -- -- 96 % -- --   07/13/20 2215 135/65 -- -- 78 -- 98 % -- --   07/13/20 2145 -- -- -- -- -- 97 % -- --   07/13/20 2100 -- -- -- -- -- 98 % -- --   07/13/20 2042 (!) 142/79 98.2  F (36.8  C) Oral 85 20 96 % 1.651 m (5' 5\") 54.3 kg (119 lb 11.4 oz)       Intake/Output Summary (Last 24 hours) at 7/14/2020 1046  Last data filed at 7/14/2020 0524  Gross per 24 hour   Intake 348.75 ml   Output 700 ml   Net -351.25 ml     Right LE shortened and internally rotated  No skin breakdown laceration or abrasion  Minimal erythema of the surrounding skin.   Bilateral calves are soft, non-tender.  Bilateral lower extremity is NVI.  Sensation intact bilateral lower extremities  Active dorsi and plantar flexion bilaterally  +Dp pulse            Data:   All laboratory data reviewed  Results for orders placed or performed during the hospital encounter of 07/13/20   XR Pelvis w Hip Right 1 View     Status: None    Narrative    Examination:  XR PELVIS AND HIP RIGHT 1 VIEW    Date:  7/13/2020 9:30 PM     Clinical Information: Right hip pain after a fall.     Additional Information: none    Comparison: none      Impression    Impression:  1.  Acute right femoral neck fracture, moderately displaced. The  greater trochanters appear intact. No additional fracture in the  pelvis or left hip.  2.  Both hip joints remain normally aligned, with " maintained joint  spacing.  3.  Unremarkable lower lumbar spine and SI joints.    JC FLOWER MD   XR Chest 1 View     Status: None    Narrative    XR CHEST 1 VW 7/13/2020 9:30 PM    HISTORY: Right Hip fracture    COMPARISON: None.      Impression    IMPRESSION: Pulmonary hyperinflation. No acute findings. The lungs are  grossly clear  No definite pleural effusions. Normal heart size. Right proximal  humerus orthopedic fixation hardware.    ATIYA NEELY MD   CBC with platelets differential     Status: Abnormal   Result Value Ref Range    WBC 17.7 (H) 4.0 - 11.0 10e9/L    RBC Count 4.71 3.8 - 5.2 10e12/L    Hemoglobin 14.1 11.7 - 15.7 g/dL    Hematocrit 41.1 35.0 - 47.0 %    MCV 87 78 - 100 fl    MCH 29.9 26.5 - 33.0 pg    MCHC 34.3 31.5 - 36.5 g/dL    RDW 13.1 10.0 - 15.0 %    Platelet Count 284 150 - 450 10e9/L    Diff Method Automated Method     % Neutrophils 85.3 %    % Lymphocytes 8.3 %    % Monocytes 5.0 %    % Eosinophils 0.6 %    % Basophils 0.2 %    % Immature Granulocytes 0.6 %    Nucleated RBCs 0 0 /100    Absolute Neutrophil 15.1 (H) 1.6 - 8.3 10e9/L    Absolute Lymphocytes 1.5 0.8 - 5.3 10e9/L    Absolute Monocytes 0.9 0.0 - 1.3 10e9/L    Absolute Eosinophils 0.1 0.0 - 0.7 10e9/L    Absolute Basophils 0.0 0.0 - 0.2 10e9/L    Abs Immature Granulocytes 0.1 0 - 0.4 10e9/L    Absolute Nucleated RBC 0.0    INR     Status: None   Result Value Ref Range    INR 1.02 0.86 - 1.14   Basic metabolic panel     Status: Abnormal   Result Value Ref Range    Sodium 139 133 - 144 mmol/L    Potassium 4.6 3.4 - 5.3 mmol/L    Chloride 107 94 - 109 mmol/L    Carbon Dioxide 29 20 - 32 mmol/L    Anion Gap 3 3 - 14 mmol/L    Glucose 105 (H) 70 - 99 mg/dL    Urea Nitrogen 15 7 - 30 mg/dL    Creatinine 0.83 0.52 - 1.04 mg/dL    GFR Estimate 69 >60 mL/min/[1.73_m2]    GFR Estimate If Black 80 >60 mL/min/[1.73_m2]    Calcium 10.4 (H) 8.5 - 10.1 mg/dL   Asymptomatic COVID-19 Virus (Coronavirus) by PCR     Status: None     Specimen: Nasopharyngeal   Result Value Ref Range    COVID-19 Virus PCR to [facility] - Source Nasopharyngeal     COVID-19 Virus PCR to [facility] - Result       Test received-See reflex to IDDL test SARS CoV2 (COVID-19) Virus RT-PCR   UA with Microscopic reflex to Culture     Status: Abnormal    Specimen: Urine catheter; Catheterized Urine   Result Value Ref Range    Color Urine Yellow     Appearance Urine Clear     Glucose Urine Negative NEG^Negative mg/dL    Bilirubin Urine Negative NEG^Negative    Ketones Urine Negative NEG^Negative mg/dL    Specific Gravity Urine 1.009 1.003 - 1.035    Blood Urine Negative NEG^Negative    pH Urine 8.0 (H) 5.0 - 7.0 pH    Protein Albumin Urine Negative NEG^Negative mg/dL    Urobilinogen mg/dL Normal 0.0 - 2.0 mg/dL    Nitrite Urine Negative NEG^Negative    Leukocyte Esterase Urine Negative NEG^Negative    Source Catheterized Urine     WBC Urine <1 0 - 5 /HPF    RBC Urine 3 (H) 0 - 2 /HPF   SARS-CoV-2 COVID-19 Virus (Coronavirus) RT-PCR Nasopharyngeal     Status: None    Specimen: Nasopharyngeal   Result Value Ref Range    SARS-CoV-2 Virus Specimen Source Nasopharyngeal     SARS-CoV-2 PCR Result NEGATIVE     SARS-CoV-2 PCR Comment       Testing was performed using the Xpert Xpress SARS-CoV-2 Assay on the Cepheid Gene-Xpert   Instrument Systems. Additional information about this Emergency Use Authorization (EUA)   assay can be found via the Lab Guide.     EKG 12-lead, tracing only     Status: None (Preliminary result)   Result Value Ref Range    Interpretation ECG Click View Image link to view waveform and result    ABO/Rh type and screen     Status: None   Result Value Ref Range    ABO B     RH(D) Pos     Antibody Screen Neg     Test Valid Only At [facility]        Specimen Expires 07/16/2020           Attestation:  I have reviewed today's vital signs, notes, medications, labs and imaging with Dr. Christopher.  Amount of time performed on this consult: 30  minutes.    Yris Salmon PA-C

## 2020-07-14 NOTE — ANESTHESIA CARE TRANSFER NOTE
Patient: Dorothea Dugan    Procedure(s):  RIGHT TOTAL HIP ARTHROPLASTY.    Diagnosis: Hip fracture (H) [S72.009A]  Diagnosis Additional Information: No value filed.    Anesthesia Type:   General     Note:  Airway :Face Mask  Patient transferred to:PACU  Comments: Neuromuscular blockade reversed after TOF 4/4, spontaneous respirations, adequate tidal volumes, followed commands to voice, oropharynx suctioned with soft flexible catheter, extubated atraumatically, extubated with suction, airway patent after extubation.  Oxygen via facemask at 4 liters per minute to PACU. Oxygen tubing connected to wall O2 in PACU, SpO2, NiBP, and EKG monitors and alarms on and functioning, Candy Hugger warmer connected to patient gown, report on patient's clinical status given to PACU RN, RN questions answered.   Handoff Report: Identifed the Patient, Identified the Reponsible Provider, Reviewed the pertinent medical history, Discussed the surgical course, Reviewed Intra-OP anesthesia mangement and issues during anesthesia, Set expectations for post-procedure period and Allowed opportunity for questions and acknowledgement of understanding      Vitals: (Last set prior to Anesthesia Care Transfer)    CRNA VITALS  7/14/2020 1421 - 7/14/2020 1456      7/14/2020             NIBP:  132/81    NIBP Mean:  105                Electronically Signed By: ROXANNE Banda CRNA  July 14, 2020  2:56 PM

## 2020-07-14 NOTE — ANESTHESIA POSTPROCEDURE EVALUATION
Patient: Dorothea Dugan    Procedure(s):  RIGHT TOTAL HIP ARTHROPLASTY.    Diagnosis:Hip fracture (H) [S72.009A]  Diagnosis Additional Information: No value filed.    Anesthesia Type:  General    Note:  Anesthesia Post Evaluation    Patient location during evaluation: Bedside  Patient participation: Able to fully participate in evaluation  Level of consciousness: awake and alert  Pain management: adequate  Airway patency: patent  Cardiovascular status: acceptable  Respiratory status: acceptable  Hydration status: acceptable  PONV: none             Last vitals:  Vitals:    07/14/20 1550 07/14/20 1600 07/14/20 1610   BP: 135/74 129/76 129/75   Pulse: 105 106    Resp: 10 10 13   Temp: 36.4  C (97.6  F)     SpO2: 100% 100% 96%         Electronically Signed By: Jens Oglesby MD  July 14, 2020  4:15 PM

## 2020-07-14 NOTE — ED PROVIDER NOTES
"  History     Chief Complaint:  Hip right    HPI   Dorothea Dugan is a 75 year old female who presents for evaluation of mild right hip pain following a fall. The patient reports that she tripped and fell in the kitchen at about 1930. She did not hit her head or lose consciousness and her  was present at the time of the fall. The pain is localized in the right hip and does not radiate to the lower leg, left leg, arms, or back. She currently cannot put weight on it however she does not experience any pain while sitting. The patient does note that her right leg feels shorter than her left leg. Pain is currently mild, worse w/ mov't, alleviate by being still.    Allergies:  Penicillins    Medications:    Lexapro    Past Medical History:    Anxiety  Insomnia  Depression  Osteopetrosis    Past Surgical History:    Oral surgery    Ankle surgery    Family History:    Mother: lymphoma  Father: prostate cancer    Social History:  Tobacco use: former smoker  Alcohol use: 1 drink a week  Negative for drug use.  Marital Status:   [2]     Review of Systems   Musculoskeletal: Positive for arthralgias (Right hip). Negative for back pain.   Neurological: Negative for syncope and headaches.   All other systems reviewed and are negative.    Physical Exam     Patient Vitals for the past 24 hrs:   BP Temp Temp src Pulse Resp SpO2 Height Weight   20 2245 -- -- -- -- -- 97 % -- --   20 2230 -- -- -- -- -- 96 % -- --   20 2215 135/65 -- -- 78 -- 98 % -- --   20 2145 -- -- -- -- -- 97 % -- --   20 2100 -- -- -- -- -- 98 % -- --   20 2042 (!) 142/79 98.2  F (36.8  C) Oral 85 20 96 % 1.651 m (5' 5\") 54.3 kg (119 lb 11.4 oz)       Physical Exam  Vital signs reviewed.  Nursing note reviewed.  Constitutional: Well-developed, Well-nourished.  Non-diaphoretic.  Mild distress    HENT: No evidence of head trauma.  No pain or instability to palpation of bony orbits, mandible, maxilla.  Good " jaw opening.  No malocclusion.  No nasal septal hematoma.  OP clear and moist.    EYES:  PERRL.  EOMI.  NECK:  No Cspine tenderness.  Trachea midline.  Full ROM.  Supple. No stridor.  CARDIAC:  RRR.   CHEST:  No external evidence of chest trauma  PULM: Effort  Normal.  Breath sounds clear and equal bilat  ABD:  Soft, NT/ND.  No guarding, no rebound.  No external evidence of abdominal trauma  BACK:  No T or L spinous process tenderness.  Pelvis stable.  EXT:  Full ROM X4.  No tenderness, edema, crepitus or obvious deformity other than that noted below   RLE: somewhat shortened, pain w/ passive ROM at hip, distal CMS intact, no lacerations    NEURO:  Alert, Oriented.  Moves ext x4  SKIN :  Warm.  Dry.   No erythema.  No rash  PSYCH.:  Normal judgment.  Normal affect.       Emergency Department Course     Laboratory:  Laboratory findings were communicated with the patient who voiced understanding of the findings.    CBC: WBC: 17.7 (H), HGB: 14.1, PLT: 284  BMP: Glucose 105 (H), Calcium 10.4 (H), o/w WNL (Creatinine: 0.83)  INR: 1.02  ABO/Rh type and screen: pending    Asymptomatic COVID-19: pending    Imaging:  Radiology findings were communicated with the patient who voiced understanding of the findings.    XR Pelvis with Hip Right 1 View:   1.  Acute right femoral neck fracture, moderately displaced. The   greater trochanters appear intact. No additional fracture in the   pelvis or left hip.   2.  Both hip joints remain normally aligned, with maintained joint   spacing.   3.  Unremarkable lower lumbar spine and SI joints. As per radiology.     XR Chest Portable 1 View:   Pulmonary hyperinflation. No acute findings. The lungs are   grossly clear. No definite pleural effusions. Normal heart size. Right proximal   humerus orthopedic fixation hardware.  as per radiology.     Interventions:   2222 Zofran 4 mg IV  2222 PF 4 mg IV    Emergency Department Course:  Past medical records, nursing notes, and vitals  reviewed.     I performed an exam of the patient as documented above.      IV was inserted and blood was drawn for laboratory testing, results above.      The patient was sent for a xray while in the emergency department, results above.      I rechecked the patient and discussed the results of her workup thus far.     Findings and plan explained to the Patient who consents to admission. Discussed the patient with Dr. Yovany Lemons, who will admit the patient to a adult med bed for further monitoring, evaluation, and treatment.    I personally reviewed the laboratory and imaging results with the Patient and answered all related questions prior to admission.     Impression & Plan     Medical Decision Makin year old female presenting w/ R hip pain s/p [facility] fall    DDx includes fracture, contusion, sprain, tendinitis, dislocation, extremity pain NOS.  Doubt vascular etiology, septic arthritis given history and physical exam.  No evidence of compartment syndrome on exam.  CMS is intact distally in the extremity.   The patients head to toe trauma exam is otherwise normal at this time and no further trauma workup is needed as I believe there is no signs of serious head, neck, chest, spinal, extremity or abdominal injuries warranting this.  Imaging sig for R femoral neck fracture.  Interventions as noted above with improvement in symptoms.  Given fracture morphology, I do not believe reduction is necessary at this time.  Pt will need ORIF w/ orthopedics for definitive fracture mgmt.  Ortho not consulted from ED given after .  She was subsequently admitted to the hospitalist service for further mgmt.  Pt and  counseled on all results, diagnosis and disposition.  They are understanding and agreeable to plan. Patient discharged in stable condition.      Diagnosis:    ICD-10-CM    1. Closed displaced fracture of right femoral neck (H)  S72.001A CBC with platelets differential     INR     Basic  metabolic panel     ABO/Rh type and screen     Asymptomatic COVID-19 Virus (Coronavirus) by PCR       Disposition:  Admitted to Dr. Khan.    Scribe Disclosure:  I, Donal Hernandez, am serving as a scribe at 8:56 PM on 7/13/2020 to document services personally performed by Richard Allen MD based on my observations and the provider's statements to me.          Richard Allen MD  07/14/20 9490

## 2020-07-15 ENCOUNTER — APPOINTMENT (OUTPATIENT)
Dept: PHYSICAL THERAPY | Facility: CLINIC | Age: 75
DRG: 470 | End: 2020-07-15
Attending: PHYSICIAN ASSISTANT
Payer: MEDICARE

## 2020-07-15 ENCOUNTER — APPOINTMENT (OUTPATIENT)
Dept: OCCUPATIONAL THERAPY | Facility: CLINIC | Age: 75
DRG: 470 | End: 2020-07-15
Attending: PHYSICIAN ASSISTANT
Payer: MEDICARE

## 2020-07-15 PROBLEM — D62 ACUTE BLOOD LOSS ANEMIA: Status: ACTIVE | Noted: 2020-07-15

## 2020-07-15 LAB
GLUCOSE BLDC GLUCOMTR-MCNC: 95 MG/DL (ref 70–99)
HGB BLD-MCNC: 10.6 G/DL (ref 11.7–15.7)
WBC # BLD AUTO: 14.7 10E9/L (ref 4–11)

## 2020-07-15 PROCEDURE — 97530 THERAPEUTIC ACTIVITIES: CPT | Mod: GO | Performed by: OCCUPATIONAL THERAPIST

## 2020-07-15 PROCEDURE — 97530 THERAPEUTIC ACTIVITIES: CPT | Mod: GP | Performed by: PHYSICAL THERAPY ASSISTANT

## 2020-07-15 PROCEDURE — 36415 COLL VENOUS BLD VENIPUNCTURE: CPT | Performed by: PHYSICIAN ASSISTANT

## 2020-07-15 PROCEDURE — 97110 THERAPEUTIC EXERCISES: CPT | Mod: GP | Performed by: PHYSICAL THERAPIST

## 2020-07-15 PROCEDURE — 97110 THERAPEUTIC EXERCISES: CPT | Mod: GP | Performed by: PHYSICAL THERAPY ASSISTANT

## 2020-07-15 PROCEDURE — 97161 PT EVAL LOW COMPLEX 20 MIN: CPT | Mod: GP | Performed by: PHYSICAL THERAPIST

## 2020-07-15 PROCEDURE — 25800030 ZZH RX IP 258 OP 636: Performed by: PHYSICIAN ASSISTANT

## 2020-07-15 PROCEDURE — 97116 GAIT TRAINING THERAPY: CPT | Mod: GP | Performed by: PHYSICAL THERAPY ASSISTANT

## 2020-07-15 PROCEDURE — 97165 OT EVAL LOW COMPLEX 30 MIN: CPT | Mod: GO | Performed by: OCCUPATIONAL THERAPIST

## 2020-07-15 PROCEDURE — 12000000 ZZH R&B MED SURG/OB

## 2020-07-15 PROCEDURE — 97535 SELF CARE MNGMENT TRAINING: CPT | Mod: GO | Performed by: OCCUPATIONAL THERAPIST

## 2020-07-15 PROCEDURE — 85018 HEMOGLOBIN: CPT | Performed by: PHYSICIAN ASSISTANT

## 2020-07-15 PROCEDURE — 99232 SBSQ HOSP IP/OBS MODERATE 35: CPT | Performed by: INTERNAL MEDICINE

## 2020-07-15 PROCEDURE — 97116 GAIT TRAINING THERAPY: CPT | Mod: GP | Performed by: PHYSICAL THERAPIST

## 2020-07-15 PROCEDURE — 25000125 ZZHC RX 250: Performed by: PHYSICIAN ASSISTANT

## 2020-07-15 PROCEDURE — 85048 AUTOMATED LEUKOCYTE COUNT: CPT | Performed by: PHYSICIAN ASSISTANT

## 2020-07-15 PROCEDURE — 25000132 ZZH RX MED GY IP 250 OP 250 PS 637: Mod: GY | Performed by: PHYSICIAN ASSISTANT

## 2020-07-15 PROCEDURE — 00000146 ZZHCL STATISTIC GLUCOSE BY METER IP

## 2020-07-15 RX ADMIN — DOCUSATE SODIUM 100 MG: 100 CAPSULE, LIQUID FILLED ORAL at 07:30

## 2020-07-15 RX ADMIN — OXYCODONE HYDROCHLORIDE 5 MG: 5 TABLET ORAL at 10:05

## 2020-07-15 RX ADMIN — OXYCODONE HYDROCHLORIDE 2.5 MG: 5 TABLET ORAL at 07:30

## 2020-07-15 RX ADMIN — ACETAMINOPHEN 975 MG: 325 TABLET, FILM COATED ORAL at 01:19

## 2020-07-15 RX ADMIN — ACETAMINOPHEN 975 MG: 325 TABLET, FILM COATED ORAL at 17:13

## 2020-07-15 RX ADMIN — OXYCODONE HYDROCHLORIDE 5 MG: 5 TABLET ORAL at 17:13

## 2020-07-15 RX ADMIN — CLINDAMYCIN IN 5 PERCENT DEXTROSE 900 MG: 18 INJECTION, SOLUTION INTRAVENOUS at 05:44

## 2020-07-15 RX ADMIN — ASPIRIN 325 MG: 325 TABLET, COATED ORAL at 07:33

## 2020-07-15 RX ADMIN — OXYCODONE HYDROCHLORIDE 2.5 MG: 5 TABLET ORAL at 21:17

## 2020-07-15 RX ADMIN — OXYCODONE HYDROCHLORIDE 2.5 MG: 5 TABLET ORAL at 06:41

## 2020-07-15 RX ADMIN — SODIUM CHLORIDE, POTASSIUM CHLORIDE, SODIUM LACTATE AND CALCIUM CHLORIDE: 600; 310; 30; 20 INJECTION, SOLUTION INTRAVENOUS at 00:41

## 2020-07-15 RX ADMIN — OXYCODONE HYDROCHLORIDE 5 MG: 5 TABLET ORAL at 13:12

## 2020-07-15 RX ADMIN — ACETAMINOPHEN 975 MG: 325 TABLET, FILM COATED ORAL at 10:06

## 2020-07-15 RX ADMIN — DOCUSATE SODIUM 50 MG AND SENNOSIDES 8.6 MG 2 TABLET: 8.6; 5 TABLET, FILM COATED ORAL at 21:17

## 2020-07-15 RX ADMIN — DOCUSATE SODIUM 50 MG AND SENNOSIDES 8.6 MG 1 TABLET: 8.6; 5 TABLET, FILM COATED ORAL at 07:30

## 2020-07-15 ASSESSMENT — ACTIVITIES OF DAILY LIVING (ADL)
ADLS_ACUITY_SCORE: 13
ADLS_ACUITY_SCORE: 14
ADLS_ACUITY_SCORE: 13
ADLS_ACUITY_SCORE: 14

## 2020-07-15 NOTE — PLAN OF CARE
"Discharge Planner OT   Patient plan for discharge: Home with assist from spouse.  Patient independent in ADLs/mobility without AE prior to admit.  Current status: Patient anxious with mobility; benefits from encouragement stating \"I need to hear that\".  Patient CGA for ambulation to/from bathroom, toilet transfer with commode overlay with slow mobility.  Spouse/patient educated in AE for LB dressing and toileting.  Barriers to return to prior living situation: Pain, decreased ROM  Recommendations for discharge: Home with assist from spouse and Home OT  Rationale for recommendations: Continued OT for maximum independence in ADLs/mobility as patient is below baseline.  Patient would benefit from Home OT eval for home safety evaluation and home set-up with progression of ADLs/IADLs within home as patient needs assist from caregiver to leave home.       Entered by: Kaitlin Bright 07/15/2020 11:27 AM       "

## 2020-07-15 NOTE — PROGRESS NOTES
07/15/20 0820   Quick Adds   Type of Visit Initial PT Evaluation   Living Environment   Lives With spouse   Living Arrangements house   Home Accessibility stairs to enter home;stairs within home   Number of Stairs, Main Entrance 2   Stair Railings, Main Entrance railing on left side (ascending)   Number of Stairs, Within Home, Primary   (15)   Stair Railings, Within Home, Primary railing on left side (ascending)   Transportation Anticipated car, drives self;family or friend will provide   Living Environment Comment Pt's spouse is able to assist at time of discharge.    Self-Care   Usual Activity Tolerance good   Current Activity Tolerance moderate   Regular Exercise Yes   Activity/Exercise Type walking   Equipment Currently Used at Home none   Functional Level Prior   Ambulation 0-->independent   Transferring 0-->independent   Fall history within last six months yes   Number of times patient has fallen within last six months 1   General Information   Onset of Illness/Injury or Date of Surgery - Date 07/13/20   Referring Physician Luis Felipe Christopher MD   Patient/Family Goals Statement Pt is unsure.    Pertinent History of Current Problem (include personal factors and/or comorbidities that impact the POC) 76 y/o female POD # 1 R LAURA after fall and sustaining R hip fracture. PMH including anxiety, insomnia, depression.   Precautions/Limitations fall precautions;right hip precautions  (Posterior-lateral hip precautions)   Weight-Bearing Status - RLE weight-bearing as tolerated   General Observations Pt in supine upon arrival of therapist.    General Info Comments Ambulate with assist.   Cognitive Status Examination   Orientation orientation to person, place and time   Level of Consciousness alert   Follows Commands and Answers Questions 100% of the time   Personal Safety and Judgment intact   Pain Assessment   Patient Currently in Pain   (R hip pain at rest: 5/10)   Integumentary/Edema   Integumentary/Edema Comments R  "hip incision covered with dressing.    Posture    Posture Comments Noted forward head and shoulder posture upon sitting EOB and standing at FWW.    Range of Motion (ROM)   ROM Comment Limited R hip ROM secondary to pain and hip precautions, otherwise B LEs WFL.    Strength   Strength Comments Not formally assessed, R LE weakness consistent after procedure.    Bed Mobility   Bed Mobility Comments Supine-sit with Nima.    Transfer Skills   Transfer Comments Sit <> stand with FWW and Nima.    Gait   Gait Comments Pt amb 10' with FWW and CGA.    Balance   Balance Comments Noted good seated and standing balance at FWW.   Sensory Examination   Sensory Perception Comments Pt denies numbness/tingling in B LEs.    Modality Interventions   Planned Modality Interventions Cryotherapy   Planned Modality Interventions Comments PRN.   General Therapy Interventions   Planned Therapy Interventions bed mobility training;gait training;ROM;strengthening;transfer training   Clinical Impression   Criteria for Skilled Therapeutic Intervention yes, treatment indicated   PT Diagnosis Difficulty with functional mobility.    Influenced by the following impairments Pain, Impaired R hip ROM, Decreased strength, Decreased activity tolerance   Functional limitations due to impairments Limited functional mobility requiring assist.    Clinical Presentation Stable/Uncomplicated   Clinical Presentation Rationale Based on PMH, current presentation, and social support.    Clinical Decision Making (Complexity) Low complexity   Therapy Frequency 2x/day   Predicted Duration of Therapy Intervention (days/wks) 3 days   Anticipated Equipment Needs at Discharge walker   Anticipated Discharge Disposition Home with Assist;Home with Home Therapy   Risk & Benefits of therapy have been explained Yes   Patient, Family & other staff in agreement with plan of care Yes   [facility] AM-PAC TM \"6 Clicks\"   2016, Trustees of [facility], under license to " "[employer].  All rights reserved.   6 Clicks Short Forms Basic Mobility Inpatient Short Form   [facility] AM-PAC  \"6 Clicks\" V.2 Basic Mobility Inpatient Short Form   1. Turning from your back to your side while in a flat bed without using bedrails? 3 - A Little   2. Moving from lying on your back to sitting on the side of a flat bed without using bedrails? 3 - A Little   3. Moving to and from a bed to a chair (including a wheelchair)? 3 - A Little   4. Standing up from a chair using your arms (e.g., wheelchair, or bedside chair)? 3 - A Little   5. To walk in hospital room? 3 - A Little   6. Climbing 3-5 steps with a railing? 2 - A Lot   Basic Mobility Raw Score (Score out of 24.Lower scores equate to lower levels of function) 17   Total Evaluation Time   Total Evaluation Time (Minutes) 4     "

## 2020-07-15 NOTE — PROGRESS NOTES
[facility]    Hospitalist Progress Note    Assessment & Plan   75 year old female who was admitted on 7/13/2020 after fall with right hip fracture:    Impression:   Principal Problem:    Right Fem Neck Hip Fracture -- S/P LAURA 7/14/20   -- POD #1, doing well      Leukocytosis -- improving, suspect stress related, no infection identified      Acute blood loss anemia         Plan:  As above    DVT Prophylaxis: Pneumatic Compression Devices  Code Status: Full Code    Disposition: Expected discharge to TCU (or home) on Friday    Yovany Khan MD  Pager [phone]  Cell Phone [phone]  Text Page (7am to 6pm)    Interval History   Mild right hip pain. Has been up with therapy, no BM yet.     Physical Exam   Temp: 98.1  F (36.7  C) Temp src: Oral BP: 103/60 Pulse: 74 Heart Rate: 97 Resp: 16 SpO2: 98 % O2 Device: None (Room air) Oxygen Delivery: 2 LPM  Vitals:    07/13/20 2042   Weight: 54.3 kg (119 lb 11.4 oz)     Vital Signs with Ranges  Temp:  [97.6  F (36.4  C)-99.1  F (37.3  C)] 98.1  F (36.7  C)  Pulse:  [] 74  Heart Rate:  [] 97  Resp:  [10-22] 16  BP: ()/(59-77) 103/60  SpO2:  [94 %-100 %] 98 %  I/O last 3 completed shifts:  In: 2200 [P.O.:200; I.V.:2000]  Out: 1700 [Urine:1500; Blood:200]    # Pain Assessment:  Current Pain Score 7/15/2020   Patient currently in pain? -   Pain score (0-10) 5   - Dorothea is experiencing pain due to hip fracture. Pain management was discussed and the plan was created in a collaborative fashion.  Dorothea's response to the current recommendations: engaged  - Please see the plan for pain management as documented above          Constitutional: Awake, alert, cooperative, no apparent distress  Respiratory: Clear to auscultation bilaterally, no crackles or wheezing  Cardiovascular: Regular rate and rhythm, normal S1 and S2, and no murmur noted  GI: Normal bowel sounds, soft, non-distended, non-tender  Extrem: No calf tenderness, no ankle  edema  Neuro: Ox3, no focal motor or sensory deficits    Medications     lactated ringers 100 mL/hr at 07/15/20 0041       acetaminophen  975 mg Oral Q8H     aspirin  325 mg Oral Daily     docusate sodium  100 mg Oral BID     polyethylene glycol  17 g Oral Daily     senna-docusate  2 tablet Oral BID     sodium chloride (PF)  3 mL Intracatheter Q8H       Data   Recent Labs   Lab 07/15/20  0659 07/13/20  2220   WBC 14.7* 17.7*   HGB 10.6* 14.1   MCV  --  87   PLT  --  284   INR  --  1.02   NA  --  139   POTASSIUM  --  4.6   CHLORIDE  --  107   CO2  --  29   BUN  --  15   CR  --  0.83   ANIONGAP  --  3   TERENCE  --  10.4*   GLC  --  105*       Imaging:   Recent Results (from the past 24 hour(s))   XR Pelvis Port 1/2 Views    Narrative    PELVIS PORTABLE ONE TO TWO VIEWS July 14, 2020 2:20 PM     HISTORY: Right total hip arthroplasty, OR 35, OR Portable, [id number],  AAG.    COMPARISON: Yesterday.      Impression    IMPRESSION: Right hip arthroplasty template.   XR Pelvis w Hip Port Right 1 View    Narrative    XR PELVIS AD HIP PORTABLE RIGHT 1 VIEW   7/14/2020 4:25 PM     HISTORY: s/p R LAURA    COMPARISON: 2:07 PM on 7/14/2020      Impression    IMPRESSION: Right total hip arthroplasty has been placed. Components  appear well seated. Postoperative gas in the surrounding soft tissues.  Mild degenerative change left hip. Pelvic phleboliths.    NAREN HARRIS MD

## 2020-07-15 NOTE — PROGRESS NOTES
Pt has [facility] Trauma Care Plan, see note by Xiomara 7/15.  Noted therapies are recommending Home Care PT & OT.  Per care plan, provided heads up to [facility].

## 2020-07-15 NOTE — PROGRESS NOTES
[facility]    Hospitalist Progress Note    Assessment & Plan   75 year old female who was admitted on 7/13/2020 after fall with right hip fracture:    Impression:   Principal Problem:    Right Fem Neck Hip Fx     -- surgery today    Active Problems:    Leukocytosis -- suspect stress related      Plan:  As above    DVT Prophylaxis: Pneumatic Compression Devices  Code Status: Full Code    Disposition: Expected discharge to TCU on Friday    Yovany Khan MD  Pager [phone]  Cell Phone [phone]  Text Page (7am to 6pm)    Interval History   Mild right hip pain.     Physical Exam   Temp: 98.1  F (36.7  C) Temp src: Oral BP: 101/59 Pulse: 102 Heart Rate: 98 Resp: 18 SpO2: 95 % O2 Device: None (Room air) Oxygen Delivery: 2 LPM  Vitals:    07/13/20 2042   Weight: 54.3 kg (119 lb 11.4 oz)     Vital Signs with Ranges  Temp:  [97.6  F (36.4  C)-99.7  F (37.6  C)] 98.1  F (36.7  C)  Pulse:  [] 102  Heart Rate:  [] 98  Resp:  [10-22] 18  BP: (101-142)/(57-79) 101/59  SpO2:  [94 %-100 %] 95 %  I/O last 3 completed shifts:  In: 1348.75 [I.V.:1348.75]  Out: 1700 [Urine:1500; Blood:200]    # Pain Assessment:  Current Pain Score 7/14/2020   Patient currently in pain? yes   Pain score (0-10) 5   - Dorothea is experiencing pain due to hip fracture. Pain management was discussed and the plan was created in a collaborative fashion.  Dorothea's response to the current recommendations: engaged  - Please see the plan for pain management as documented above          Constitutional: Awake, alert, cooperative, no apparent distress  Respiratory: Clear to auscultation bilaterally, no crackles or wheezing  Cardiovascular: Regular rate and rhythm, normal S1 and S2, and no murmur noted  GI: Normal bowel sounds, soft, non-distended, non-tender  Extrem: No calf tenderness, no ankle edema  Neuro: Ox3, no focal motor or sensory deficits    Medications     lactated ringers 100 mL/hr at 07/14/20 1704        acetaminophen  975 mg Oral Q8H     aspirin  325 mg Oral Daily     clindamycin  900 mg Intravenous Q8H     docusate sodium  100 mg Oral BID     polyethylene glycol  17 g Oral Daily     senna-docusate  2 tablet Oral BID     sodium chloride (PF)  3 mL Intracatheter Q8H       Data   Recent Labs   Lab 07/13/20  2220   WBC 17.7*   HGB 14.1   MCV 87      INR 1.02      POTASSIUM 4.6   CHLORIDE 107   CO2 29   BUN 15   CR 0.83   ANIONGAP 3   TERENCE 10.4*   *       Imaging:   Recent Results (from the past 24 hour(s))   XR Pelvis w Hip Right 1 View    Narrative    Examination:  XR PELVIS AND HIP RIGHT 1 VIEW    Date:  7/13/2020 9:30 PM     Clinical Information: Right hip pain after a fall.     Additional Information: none    Comparison: none      Impression    Impression:  1.  Acute right femoral neck fracture, moderately displaced. The  greater trochanters appear intact. No additional fracture in the  pelvis or left hip.  2.  Both hip joints remain normally aligned, with maintained joint  spacing.  3.  Unremarkable lower lumbar spine and SI joints.    JC FLOWER MD   XR Chest 1 View    Narrative    XR CHEST 1 VW 7/13/2020 9:30 PM    HISTORY: Right Hip fracture    COMPARISON: None.      Impression    IMPRESSION: Pulmonary hyperinflation. No acute findings. The lungs are  grossly clear  No definite pleural effusions. Normal heart size. Right proximal  humerus orthopedic fixation hardware.    ATIYA NEELY MD   XR Pelvis Port 1/2 Views    Narrative    PELVIS PORTABLE ONE TO TWO VIEWS July 14, 2020 2:20 PM     HISTORY: Right total hip arthroplasty, OR 35, OR Portable, [id number],  AAG.    COMPARISON: Yesterday.      Impression    IMPRESSION: Right hip arthroplasty template.

## 2020-07-15 NOTE — PLAN OF CARE
Discharge Planner PT   Patient plan for discharge: Pt is unsure.   Current status: Evaluation completed and treatment initiated. Patient is POD # 1 R LAURA, posterior-lateral approach. Pt completed bed mobility with Nima. Transfers with FWW and Nima. Pt ambulated 115 feet with FWW and CGA, cues provided for upright posture  Barriers to return to prior living situation: Stairs-not yet assessed, Level of assist, Hip Precautions, Pain   Recommendations for discharge: Return home with assist of 1 for functional mobility with use of FWW and Home PT  Rationale for recommendations: Anticipate pt will continue to progress towards independence in order to safely return home. Pt will benefit from Home PT in order to progress independence and safety with functional mobility.        Entered by: Monique Stewart 07/15/2020 9:09 AM

## 2020-07-15 NOTE — DISCHARGE INSTRUCTIONS
HOMECARE NOTE:   Your doctor has ordered home care to help you after your hospital stay.  The staff will contact you to schedule your first visit.  This service will be provided by [facility].  If you have any question, or have not received a call within 48 hours of discharge, please call them at [phone] or [phone].  *please see homecare quality ratings for all homecares in your area at www.medicare.gov

## 2020-07-15 NOTE — PLAN OF CARE
Pt is alert and oriented.Refuse to dangle .Poor appetite.IVF running.Took oxycodone for pain.Gagnon removed at 6.30.Will monitor

## 2020-07-15 NOTE — PROGRESS NOTES
07/15/20 1019   Quick Adds   Type of Visit Initial Occupational Therapy Evaluation   Living Environment   Lives With spouse   Living Arrangements house   Number of Stairs, Main Entrance 2   Transportation Anticipated car, drives self;family or friend will provide   Living Environment Comment Has both walk-in shower with grab bars and shower chair, higher toilet   Self-Care   Usual Activity Tolerance good   Current Activity Tolerance moderate   Regular Exercise Yes   Activity/Exercise Type walking   Equipment Currently Used at Home none   Activity/Exercise/Self-Care Comment Patient independent in ADLs/mobility without AE   Functional Level   Ambulation 0-->independent   Transferring 0-->independent   Toileting 0-->independent   Bathing 0-->independent   Dressing 0-->independent   Eating 0-->independent   Communication 0-->understands/communicates without difficulty   Swallowing 0-->swallows foods/liquids without difficulty   Cognition 0 - no cognition issues reported   Fall history within last six months yes   Number of times patient has fallen within last six months 1   Prior Functional Level Comment Patient independent in ADLs/mobility without AE   General Information   Onset of Illness/Injury or Date of Surgery - Date 07/14/20   Referring Physician Dr. Christopher   Patient/Family Goals Statement return home to baseline   Additional Occupational Profile Info/Pertinent History of Current Problem POD #1 s/p R LAURA after fx/fall   Precautions/Limitations right hip precautions   Weight-Bearing Status - RLE weight-bearing as tolerated   General Observations On RA, alert   Cognitive Status Examination   Cognitive Comment No cognitive concerns   Sensory Examination   Sensory Comments No N/T noted   Pain Assessment   Patient Currently in Pain Yes, see Vital Sign flowsheet   Mobility   Bed Mobility Comments Patient in chair during session; bed mobility not assessed   Transfer Skill: Bed to Chair/Chair to Bed   Level of  "[place]: Bed to Chair contact guard   Assistive Device - Transfer Skill Bed to Chair Chair to Bed Rehab Eval standard walker   Transfer Skill: Sit to Stand   Level of [place]: Sit/Stand contact guard   Assistive Device for Transfer: Sit/Stand standard walker   Transfer Skill: Toilet Transfer   Level of [place]: Toilet contact guard   Assistive Device standard walker;seat riser;grab bars   Upper Body Dressing   Level of [place]: Dress Upper Body independent   Lower Body Dressing   Level of [place]: Dress Lower Body minimum assist (75% patients effort)   Assistive Device long-handled shoe horn;reacher;sock-aid   Toileting   Level of [place]: Toilet stand-by assist   Grooming   Level of [place]: Grooming independent   Eating/Self Feeding   Level of [place]: Eating independent   Activities of Daily Living Analysis   Impairments Contributing to Impaired Activities of Daily Living fear and anxiety;pain;post surgical precautions;ROM decreased   General Therapy Interventions   Planned Therapy Interventions ADL retraining   Clinical Impression   Criteria for Skilled Therapeutic Interventions Met yes, treatment indicated   OT Diagnosis impaired self-cares/mobility   Influenced by the following impairments pain; decreased ROM   Assessment of Occupational Performance 1-3 Performance Deficits   Identified Performance Deficits dressing, bathing, toileting   Clinical Decision Making (Complexity) Low complexity   Therapy Frequency Daily   Predicted Duration of Therapy Intervention (days/wks) 2-3 days   Anticipated Discharge Disposition Home with Home Therapy   Risks and Benefits of Treatment have been explained. Yes   Patient, Family & other staff in agreement with plan of care Yes   [facility] AM-PAC TM \"6 Clicks\"   2016, Trustees of [facility], under license to [employer].  All rights reserved.   6 Clicks Short Forms Daily Activity Inpatient Short Form   [facility] " "AM-PAC  \"6 Clicks\" Daily Activity Inpatient Short Form   1. Putting on and taking off regular lower body clothing? 3 - A Little   2. Bathing (including washing, rinsing, drying)? 2 - A Lot   3. Toileting, which includes using toilet, bedpan or urinal? 3 - A Little   4. Putting on and taking off regular upper body clothing? 4 - None   5. Taking care of personal grooming such as brushing teeth? 4 - None   6. Eating meals? 4 - None   Daily Activity Raw Score (Score out of 24.Lower scores equate to lower levels of function) 20   Total Evaluation Time   Total Evaluation Time (Minutes) 8     "

## 2020-07-15 NOTE — PLAN OF CARE
VSS, CMS intact. Up with 1 and walker. Pain well managed with oxycodone and tylenol. A&OX4. Possible discharge home tomorrow.

## 2020-07-15 NOTE — PROGRESS NOTES
SW:  D: Spoke to [facility]. Should patient need to discharge to TCU, [facility] has a bed available per conversation with [facility] Care Coordinator.     P: Will continue to follow for discharge plan and will update [facility] accordingly.    MAZIN Tuttle    [facility]

## 2020-07-15 NOTE — PROGRESS NOTES
Orthopedic Surgery  Dorothea Dugan  7/15/2020  Admit Date:  2020  POD: 1 Day Post-Op   Procedure(s):  RIGHT TOTAL HIP ARTHROPLASTY.    Alert and oriented to person, place, and time.  Patient resting comfortably in bed.    Pain improving.  Tolerating oral intake.    Denies nausea or vomiting  Denies chest pain or shortness of breath    Vital Sign Ranges  Temperature Temp  Av.3  F (36.8  C)  Min: 97.6  F (36.4  C)  Max: 99.1  F (37.3  C)   Blood pressure Systolic (24hrs), Av , Min:97 , Max:135        Diastolic (24hrs), Av, Min:56, Max:77      Pulse Pulse  Av.9  Min: 74  Max: 109   Respirations Resp  Avg: 15  Min: 10  Max: 20   Pulse oximetry SpO2  Av.9 %  Min: 94 %  Max: 100 %       Dressing is clean, dry, and intact.   Minimal erythema of the surrounding skin.   Bilateral calves are soft, non-tender.  Right lower extremity is NVI.  Sensation intact bilateral lower extremities  Patient able to resist dorsi and plantar flexion bilaterally  +Dp pulse    Labs:  Recent Labs   Lab Test 20  2220   POTASSIUM 4.6     Recent Labs   Lab Test 07/15/20  0659 20  2220   HGB 10.6* 14.1     Recent Labs   Lab Test 20  2220   INR 1.02     Recent Labs   Lab Test 20  2220          1. PLAN:   Continue ASA x 4 weeks for DVT prophylaxis.     Mobilize with PT/OT    WBAT Right LE with walker, posterior precautions x 6 weeks.     Hip abduction pillow when sleeping x 6 weeks.    Continue current pain regiment.   Dressings: Keep intact.  Change if >60% saturated    2. Disposition   Anticipate d/c to home with home care likely 2 days when medically cleared and progressing in PT.    Shivani Medel PA-C

## 2020-07-16 ENCOUNTER — APPOINTMENT (OUTPATIENT)
Dept: PHYSICAL THERAPY | Facility: CLINIC | Age: 75
DRG: 470 | End: 2020-07-16
Payer: MEDICARE

## 2020-07-16 ENCOUNTER — APPOINTMENT (OUTPATIENT)
Dept: OCCUPATIONAL THERAPY | Facility: CLINIC | Age: 75
DRG: 470 | End: 2020-07-16
Payer: MEDICARE

## 2020-07-16 LAB
HGB BLD-MCNC: 11.5 G/DL (ref 11.7–15.7)
INTERPRETATION ECG - MUSE: NORMAL

## 2020-07-16 PROCEDURE — 97530 THERAPEUTIC ACTIVITIES: CPT | Mod: GP | Performed by: PHYSICAL THERAPY ASSISTANT

## 2020-07-16 PROCEDURE — 97110 THERAPEUTIC EXERCISES: CPT | Mod: GP

## 2020-07-16 PROCEDURE — 85018 HEMOGLOBIN: CPT | Performed by: PHYSICIAN ASSISTANT

## 2020-07-16 PROCEDURE — 25000132 ZZH RX MED GY IP 250 OP 250 PS 637: Mod: GY | Performed by: PHYSICIAN ASSISTANT

## 2020-07-16 PROCEDURE — 99232 SBSQ HOSP IP/OBS MODERATE 35: CPT | Performed by: INTERNAL MEDICINE

## 2020-07-16 PROCEDURE — 97535 SELF CARE MNGMENT TRAINING: CPT | Mod: GO | Performed by: OCCUPATIONAL THERAPIST

## 2020-07-16 PROCEDURE — 97116 GAIT TRAINING THERAPY: CPT | Mod: GP | Performed by: PHYSICAL THERAPY ASSISTANT

## 2020-07-16 PROCEDURE — 12000000 ZZH R&B MED SURG/OB

## 2020-07-16 PROCEDURE — 97116 GAIT TRAINING THERAPY: CPT | Mod: GP

## 2020-07-16 PROCEDURE — 25000132 ZZH RX MED GY IP 250 OP 250 PS 637: Mod: GY | Performed by: INTERNAL MEDICINE

## 2020-07-16 PROCEDURE — 36415 COLL VENOUS BLD VENIPUNCTURE: CPT | Performed by: PHYSICIAN ASSISTANT

## 2020-07-16 RX ORDER — IBUPROFEN 600 MG/1
600 TABLET, FILM COATED ORAL EVERY 6 HOURS PRN
Qty: 60 TABLET | Refills: 0 | Status: SHIPPED | OUTPATIENT
Start: 2020-07-16 | End: 2020-07-17

## 2020-07-16 RX ORDER — OXYCODONE HYDROCHLORIDE 5 MG/1
2.5-5 TABLET ORAL EVERY 4 HOURS PRN
Qty: 45 TABLET | Refills: 0 | Status: SHIPPED | OUTPATIENT
Start: 2020-07-16 | End: 2020-07-17

## 2020-07-16 RX ORDER — ONDANSETRON 4 MG/1
4 TABLET, ORALLY DISINTEGRATING ORAL EVERY 6 HOURS PRN
Qty: 10 TABLET | Refills: 0 | Status: SHIPPED | OUTPATIENT
Start: 2020-07-16 | End: 2020-07-17

## 2020-07-16 RX ORDER — AMOXICILLIN 250 MG
2 CAPSULE ORAL 2 TIMES DAILY PRN
Qty: 45 TABLET | Refills: 0 | Status: SHIPPED | OUTPATIENT
Start: 2020-07-16 | End: 2020-07-17

## 2020-07-16 RX ORDER — ASPIRIN 325 MG
325 TABLET, DELAYED RELEASE (ENTERIC COATED) ORAL DAILY
Qty: 30 TABLET | Refills: 0 | Status: SHIPPED | OUTPATIENT
Start: 2020-07-17 | End: 2020-07-17

## 2020-07-16 RX ORDER — ACETAMINOPHEN 325 MG/1
650 TABLET ORAL EVERY 6 HOURS PRN
Qty: 60 TABLET | Refills: 0 | Status: SHIPPED | OUTPATIENT
Start: 2020-07-16 | End: 2020-07-17

## 2020-07-16 RX ORDER — POLYETHYLENE GLYCOL 3350 17 G/17G
17 POWDER, FOR SOLUTION ORAL DAILY PRN
Qty: 30 PACKET | Refills: 0 | Status: SHIPPED | OUTPATIENT
Start: 2020-07-16 | End: 2021-08-19

## 2020-07-16 RX ADMIN — OXYCODONE HYDROCHLORIDE 2.5 MG: 5 TABLET ORAL at 16:00

## 2020-07-16 RX ADMIN — ACETAMINOPHEN 975 MG: 325 TABLET, FILM COATED ORAL at 09:28

## 2020-07-16 RX ADMIN — OXYCODONE HYDROCHLORIDE 5 MG: 5 TABLET ORAL at 09:27

## 2020-07-16 RX ADMIN — DOCUSATE SODIUM 100 MG: 100 CAPSULE, LIQUID FILLED ORAL at 21:49

## 2020-07-16 RX ADMIN — ESCITALOPRAM 2.5 MG: 5 TABLET, FILM COATED ORAL at 13:08

## 2020-07-16 RX ADMIN — ACETAMINOPHEN 975 MG: 325 TABLET, FILM COATED ORAL at 17:26

## 2020-07-16 RX ADMIN — DOCUSATE SODIUM 50 MG AND SENNOSIDES 8.6 MG 2 TABLET: 8.6; 5 TABLET, FILM COATED ORAL at 07:40

## 2020-07-16 RX ADMIN — DOCUSATE SODIUM 50 MG AND SENNOSIDES 8.6 MG 2 TABLET: 8.6; 5 TABLET, FILM COATED ORAL at 21:49

## 2020-07-16 RX ADMIN — ASPIRIN 325 MG: 325 TABLET, COATED ORAL at 07:40

## 2020-07-16 RX ADMIN — OXYCODONE HYDROCHLORIDE 2.5 MG: 5 TABLET ORAL at 19:35

## 2020-07-16 RX ADMIN — DOCUSATE SODIUM 100 MG: 100 CAPSULE, LIQUID FILLED ORAL at 07:40

## 2020-07-16 RX ADMIN — OXYCODONE HYDROCHLORIDE 2.5 MG: 5 TABLET ORAL at 06:22

## 2020-07-16 RX ADMIN — OXYCODONE HYDROCHLORIDE 2.5 MG: 5 TABLET ORAL at 13:06

## 2020-07-16 ASSESSMENT — ACTIVITIES OF DAILY LIVING (ADL)
ADLS_ACUITY_SCORE: 14
ADLS_ACUITY_SCORE: 13
ADLS_ACUITY_SCORE: 14
ADLS_ACUITY_SCORE: 13

## 2020-07-16 NOTE — PLAN OF CARE
VSS, CMS intact. Up with 1 and walker. Pain well controlled with oxycodone and tylenol. A&OX4. Discharge home tomorrow.

## 2020-07-16 NOTE — PLAN OF CARE
VSS on RA. CMS intact. Aquacel CDI. A &O x4. A1 w/ walker and gaitbelt. Regular diet. Continent using bedside commode. R PIV SL. Pain managed with 2.5mg oxycodone

## 2020-07-16 NOTE — PLAN OF CARE
Discharge Planner PT   Patient plan for discharge: Pt is unsure.   Current status: Pt performed sit to/from stand transfers with min assist and increased time to initiate transfers and cues for safety.  Gait training 10 ft x 1 and 60 ft x 1 using wheeled walker and CGA.  Difficulty initiating gait.  Very guarded with WB and needs encouragement to take a bigger steps on the left to increase WB on the right.  Pt performed 3 stairs x 1 using bilateral rails.  Pt anxious and tearful with stairs today.  Barriers to return to prior living situation:  Level of assist, Hip Precautions, Pain   Recommendations for discharge: Return home with assist of 1 for functional mobility with use of FWW and Home PT per plan established by the PT.   Rationale for recommendations: Anticipate pt will continue to progress towards independence in order to safely return home. Pt will benefit from Home PT in order to progress independence and safety with functional mobility.        Entered by: Kira Oseguera 07/16/2020 9:53 AM

## 2020-07-16 NOTE — PROGRESS NOTES
[facility]    Hospitalist Progress Note    Assessment & Plan   75 year old female who was admitted on 7/13/2020 after fall with right hip fracture:    Impression:   Principal Problem:    Right Fem Neck Hip Fx  Related to Mech fall  -- S/P LAURA 7/14/20   -- POD #2, doing well      Leukocytosis -- improving, suspect stress related, no infection identified      Acute blood loss anemia -- stable          Plan:  As above    DVT Prophylaxis: Pneumatic Compression Devices  Code Status: Full Code    Disposition: Expected discharge to TCU (or home) on Friday    Yovany Khan MD  Pager [phone]  Cell Phone [phone]  Text Page (7am to 6pm)    Interval History   Mild right hip pain, no BM yet. At this point she is planning to go home tomorrow with home care.     Physical Exam   Temp: 98  F (36.7  C) Temp src: Oral BP: 104/60 Pulse: 76 Heart Rate: 98 Resp: 16 SpO2: 97 % O2 Device: None (Room air)    Vitals:    07/13/20 2042   Weight: 54.3 kg (119 lb 11.4 oz)     Vital Signs with Ranges  Temp:  [97.6  F (36.4  C)-98.2  F (36.8  C)] 98  F (36.7  C)  Pulse:  [74-83] 76  Heart Rate:  [98] 98  Resp:  [16-18] 16  BP: (103-131)/(56-69) 104/60  SpO2:  [97 %-100 %] 97 %  I/O last 3 completed shifts:  In: 250 [P.O.:250]  Out: 850 [Urine:850]    # Pain Assessment:  Current Pain Score 7/16/2020   Patient currently in pain? denies   Pain score (0-10) -   - Dorothea is experiencing pain due to hip fracture. Pain management was discussed and the plan was created in a collaborative fashion.  Dorothea's response to the current recommendations: engaged  - Please see the plan for pain management as documented above          Constitutional: Awake, alert, cooperative, no apparent distress  Respiratory: Clear to auscultation bilaterally, no crackles or wheezing  Cardiovascular: Regular rate and rhythm, normal S1 and S2, and no murmur noted  GI: Normal bowel sounds, soft, non-distended, non-tender  Extrem: No calf  tenderness, no ankle edema  Neuro: Ox3, no focal motor or sensory deficits    Medications     lactated ringers 100 mL/hr at 07/15/20 0041       acetaminophen  975 mg Oral Q8H     aspirin  325 mg Oral Daily     docusate sodium  100 mg Oral BID     escitalopram  2.5 mg Oral Daily     polyethylene glycol  17 g Oral Daily     senna-docusate  2 tablet Oral BID     sodium chloride (PF)  3 mL Intracatheter Q8H       Data   Recent Labs   Lab 07/16/20  0630 07/15/20  0659 07/13/20  2220   WBC  --  14.7* 17.7*   HGB 11.5* 10.6* 14.1   MCV  --   --  87   PLT  --   --  284   INR  --   --  1.02   NA  --   --  139   POTASSIUM  --   --  4.6   CHLORIDE  --   --  107   CO2  --   --  29   BUN  --   --  15   CR  --   --  0.83   ANIONGAP  --   --  3   TERENCE  --   --  10.4*   GLC  --   --  105*       Imaging:   No results found for this or any previous visit (from the past 24 hour(s)).

## 2020-07-16 NOTE — PLAN OF CARE
Discharge Planner OT   Patient plan for discharge: Home with assist from spouse.  Patient independent in ADLs/mobility without AE prior to admit.  Current status: Reviewed ADLs and precautions with patient; family ordered all AE for LB dressing and toilet safety frame.  Patient appears much more relaxed today and declines need to review LB dressing or toilet task.  Patient provided demonstration of technique for shower transfer; reports no concerns.  Barriers to return to prior living situation: None  Recommendations for discharge: Home with assist from spouse   Rationale for recommendations: No further inpatient or home OT needs anticipated.    Occupational Therapy Discharge Summary    Reason for therapy discharge:    All goals and outcomes met, no further needs identified.    Progress towards therapy goal(s). See goals on Care Plan [facility] electronic health record for goal details.  Goals met    Therapy recommendation(s):    No further therapy is recommended.           Entered by: Kaitlin Bright 07/16/2020 2:50 PM

## 2020-07-17 ENCOUNTER — APPOINTMENT (OUTPATIENT)
Dept: PHYSICAL THERAPY | Facility: CLINIC | Age: 75
DRG: 470 | End: 2020-07-17
Payer: MEDICARE

## 2020-07-17 VITALS
BODY MASS INDEX: 19.94 KG/M2 | SYSTOLIC BLOOD PRESSURE: 121 MMHG | OXYGEN SATURATION: 98 % | HEART RATE: 83 BPM | HEIGHT: 65 IN | WEIGHT: 119.71 LBS | DIASTOLIC BLOOD PRESSURE: 65 MMHG | RESPIRATION RATE: 16 BRPM | TEMPERATURE: 98.1 F

## 2020-07-17 PROCEDURE — 25000132 ZZH RX MED GY IP 250 OP 250 PS 637: Mod: GY | Performed by: PHYSICIAN ASSISTANT

## 2020-07-17 PROCEDURE — 97110 THERAPEUTIC EXERCISES: CPT | Mod: GP

## 2020-07-17 PROCEDURE — 25000132 ZZH RX MED GY IP 250 OP 250 PS 637: Mod: GY | Performed by: INTERNAL MEDICINE

## 2020-07-17 PROCEDURE — 97116 GAIT TRAINING THERAPY: CPT | Mod: GP

## 2020-07-17 PROCEDURE — 99239 HOSP IP/OBS DSCHRG MGMT >30: CPT | Performed by: INTERNAL MEDICINE

## 2020-07-17 RX ORDER — AMOXICILLIN 250 MG
2 CAPSULE ORAL 2 TIMES DAILY
Qty: 100 TABLET | Refills: 0 | Status: SHIPPED | OUTPATIENT
Start: 2020-07-17 | End: 2021-08-19

## 2020-07-17 RX ORDER — ACETAMINOPHEN 325 MG/1
650 TABLET ORAL EVERY 6 HOURS PRN
Qty: 100 TABLET | Refills: 0 | Status: ON HOLD | OUTPATIENT
Start: 2020-07-17 | End: 2021-08-20

## 2020-07-17 RX ORDER — ONDANSETRON 4 MG/1
4 TABLET, ORALLY DISINTEGRATING ORAL EVERY 6 HOURS PRN
Qty: 10 TABLET | Refills: 0 | Status: SHIPPED | OUTPATIENT
Start: 2020-07-17 | End: 2021-08-19

## 2020-07-17 RX ORDER — OXYCODONE HYDROCHLORIDE 5 MG/1
2.5-5 TABLET ORAL EVERY 4 HOURS PRN
Qty: 40 TABLET | Refills: 0 | Status: SHIPPED | OUTPATIENT
Start: 2020-07-17 | End: 2021-08-19

## 2020-07-17 RX ORDER — AMOXICILLIN 250 MG
2 CAPSULE ORAL 2 TIMES DAILY
Qty: 100 TABLET | Refills: 0 | Status: SHIPPED | OUTPATIENT
Start: 2020-07-17 | End: 2020-07-17

## 2020-07-17 RX ORDER — ASPIRIN 325 MG
325 TABLET, DELAYED RELEASE (ENTERIC COATED) ORAL DAILY
Qty: 30 TABLET | Refills: 0 | Status: SHIPPED | OUTPATIENT
Start: 2020-07-17 | End: 2024-03-11

## 2020-07-17 RX ORDER — IBUPROFEN 600 MG/1
600 TABLET, FILM COATED ORAL 3 TIMES DAILY PRN
Qty: 100 TABLET | Refills: 0 | Status: SHIPPED | OUTPATIENT
Start: 2020-07-17 | End: 2024-03-11

## 2020-07-17 RX ADMIN — ESCITALOPRAM 2.5 MG: 5 TABLET, FILM COATED ORAL at 09:38

## 2020-07-17 RX ADMIN — ACETAMINOPHEN 975 MG: 325 TABLET, FILM COATED ORAL at 04:47

## 2020-07-17 RX ADMIN — DOCUSATE SODIUM 100 MG: 100 CAPSULE, LIQUID FILLED ORAL at 07:53

## 2020-07-17 RX ADMIN — ASPIRIN 325 MG: 325 TABLET, COATED ORAL at 07:53

## 2020-07-17 RX ADMIN — ACETAMINOPHEN 975 MG: 325 TABLET, FILM COATED ORAL at 09:39

## 2020-07-17 RX ADMIN — OXYCODONE HYDROCHLORIDE 2.5 MG: 5 TABLET ORAL at 09:38

## 2020-07-17 RX ADMIN — POLYETHYLENE GLYCOL 3350 17 G: 17 POWDER, FOR SOLUTION ORAL at 07:34

## 2020-07-17 RX ADMIN — DOCUSATE SODIUM 50 MG AND SENNOSIDES 8.6 MG 2 TABLET: 8.6; 5 TABLET, FILM COATED ORAL at 07:53

## 2020-07-17 ASSESSMENT — ACTIVITIES OF DAILY LIVING (ADL)
ADLS_ACUITY_SCORE: 13
ADLS_ACUITY_SCORE: 14

## 2020-07-17 NOTE — PLAN OF CARE
A/Ox4. VSS RA. Up with assist x1, GB, walker. IV removed. Tolerating regular diet. Voiding adequately in BSC. PRN oxycodone for pain management. Reviewed medication and discharge instructions with patient and spouse. Hard copy of prescriptions sent with patient. Pt discharged from unit at 12:45pm with belongings to go home with spouse.

## 2020-07-17 NOTE — PROGRESS NOTES
Orthopedic Surgery  Dorothea Dugan  2020  Admit Date:  2020  POD 3 Days Post-Op  S/P Procedure(s):  RIGHT TOTAL HIP ARTHROPLASTY.    Patient resting comfortably in chair.    Pain controlled.  Tolerating oral intake.    Denies nausea or vomiting  Denies chest pain or shortness of breath  No events overnight.     Alert and orient to person, place, and time.  Vital Sign Ranges  Temperature Temp  Av.2  F (36.8  C)  Min: 98  F (36.7  C)  Max: 98.4  F (36.9  C)   Blood pressure Systolic (24hrs), Av , Min:111 , Max:147        Diastolic (24hrs), Av, Min:59, Max:80      Pulse Pulse  Av.5  Min: 83  Max: 95   Respirations Resp  Av.1  Min: 16  Max: 18   Pulse oximetry SpO2  Av %  Min: 95 %  Max: 98 %       Dressing is clean, dry, and intact.   Minimal erythema of the surrounding skin.   Bilateral calves are soft, non-tender.  Bilateral lower extremity is NVI.  Sensation intact bilateral lower extremities  5/5 motor with resisted dorsi and plantar flexion bilaterally  +Dp pulse    Labs:  Recent Labs   Lab Test 20  2220   POTASSIUM 4.6     Recent Labs   Lab Test 20  0630 07/15/20  0659 20  2220   HGB 11.5* 10.6* 14.1     Recent Labs   Lab Test 20  2220   INR 1.02     Recent Labs   Lab Test 20  2220          A/P  1. S/p right LAURA s/t fem neck fracture   Continue ASA for DVT prophylaxis.     Mobilize with PT/OT    WBAT with walker.     Hip precautions   Hip abd pillow while sleeping x 6 weeks   Leave dressing intact   Continue current pain regiment.    2. Disposition   Anticipate d/c to home today     Yris Salmon PA-C

## 2020-07-17 NOTE — PLAN OF CARE
Discharge Planner PT   Patient plan for discharge: Pt is unsure.   Current status: Pt transferred supine to sit on EOB with assist for R LE. Pt transferred STSx3 this session with CGA. Gait training 125' with FWW and CGA and close w/c follow progressing to SBA with increased gait. Initially pt ambulated with short step to pattern with decreased ability to bring L LE to meet R. Pt sliding L LE on the floor and cues to increase hip flexion and or heel strike provided. Pt performed 3 steps x1 with 1 rail and CGAx2 d/t increased fear, pt getting emotional on the stairs but steady. Following sitting rest break pt performed platform step x1 with CGA and assist with holding front of walker. Following gait pt left sitting in w/c with alarm on and needs in reach.   Barriers to return to prior living situation:  Level of assist, Hip Precautions, Pain   Recommendations for discharge: Return home with assist of 1 for functional mobility with use of FWW and Home PT per plan established by the PT.   Rationale for recommendations: Anticipate pt will continue to progress towards independence in order to safely return home. Pt will benefit from Home PT in order to progress independence and safety with functional mobility.       Entered by: Janae Victoria 07/17/2020 10:33 AM       Pt discharged home with assist of spouse and home PT. PT goals partially met.

## 2020-07-17 NOTE — PROVIDER NOTIFICATION
Care Coordination: paged hospitalist (admitting/discharge)    Home Care called with reminder (per [facility] Trauma Care Plan): Please enter Home RN PT OT  & F2F orders as per discharge plan, thank you :)     Cristel Abrams RN, BSN, PHN  [facility] Care Coordination  [facility]   Mobile: [phone]

## 2020-07-17 NOTE — DISCHARGE SUMMARY
[facility]    Discharge Summary  Hospitalist    Date of Admission:  7/13/2020  Date of Discharge:  7/19/2020  Discharging Provider: Yovany Khan MD    Discharge Diagnoses   Principal Problem:    Right Fem Neck Hip Fracture -- S/P LAURA 7/14/20  Active Problems:    Leukocytosis    Acute blood loss anemia      History of Present Illness   75 year old female who presents for evaluation of mild right hip pain following a fall. The patient reports that she tripped and fell in the kitchen at about 1930. She did not hit her head or lose consciousness and her  was present at the time of the fall. The pain is localized in the right hip and does not radiate to the lower leg, left leg, arms, or back. She currently cannot put weight on it however does not experience any pain while sitting. The patient does note that her right leg feels shorter than her left leg.      In ER, afeb, vital signs stable, WBC 17.7, hgb 14.1, right hip xray with right fem neck facture.    Hospital Course   Admitted to ortho floor, seen by Dr. Luis Felipe Christopher with [facility], had right LAURA completed without complications.  Hgb did drop to 10.6, but was up to 11.5 by time of discharge.  Bowels moved prior to discharge. Was able to discharge home, and will have home care with PT.     Yovany Khan MD, MD  Pager: [phone]  Cell Phone:  [phone]       Significant Results and Procedures   As above    Pending Results   These results will be followed up by Dr. Khan  Unresulted Labs Ordered in the Past 30 Days of this Admission     No orders found from 6/13/2020 to 7/14/2020.          Code Status   Full Code       Primary Care Physician   Sun Virgen MD    Physical Exam   Temp: 98.1  F (36.7  C) Temp src: Oral BP: 121/65 Pulse: 83 Heart Rate: 81 Resp: 16 SpO2: 98 % O2 Device: None (Room air)    Vitals:    07/13/20 2042   Weight: 54.3 kg (119 lb 11.4 oz)     Vital Signs with Ranges  Temp:  [98  F (36.7   C)-98.4  F (36.9  C)] 98.1  F (36.7  C)  Pulse:  [83-89] 83  Heart Rate:  [81-89] 81  Resp:  [16-18] 16  BP: (111-147)/(59-80) 121/65  SpO2:  [96 %-98 %] 98 %  I/O last 3 completed shifts:  In: 480 [P.O.:480]  Out: 730 [Urine:730]    Exam on discharge:   Lungs clear  CV with reg S1S2    Discharge Disposition   Discharged to home  Condition at discharge: Good    Consultations This Hospital Stay   ORTHOPEDIC SURGERY IP CONSULT  OCCUPATIONAL THERAPY ADULT IP CONSULT  PHYSICAL THERAPY ADULT IP CONSULT    Time Spent on this Encounter   I spent a total of 35 minutes discharging this patient.     Discharge Orders      Home care nursing referral      Home Care PT Referral for Hospital Discharge      Home Care OT Referral for Hospital Discharge      Reason for your hospital stay    Right femoral neck fracture after fall, Right total hip arthroplasty     Follow-up and recommended labs and tests     Follow up with Dr. Luis Felipe Christopher/Trina Barba PAC , at (location with clinic name or city[facility] within 14 days  to evaluate after surgery. No follow up labs or test are needed prior to visit. At this visit x-rays will be taken, sutures/staples removed, and questions to be answered.  Bring any needed forms with to appointment.  Call for appointment (Raisa - Patient Coordinator): [phone]  After hours: [phone]     Activity    Your activity upon discharge: activity as tolerated, ambulate in house and no driving while on analgesics  Follow hip precautions as discussed in hospital  Sleep with hip abduction pillow x 6 weeks     Wound care and dressings    Instructions to care for your wound at home: ice to area for comfort, reinforce dressing as needed and may shower but do not soak or scrub dressing - do not submerge dressing area.     Discharge Instructions    Call Dr. Lemons if any medical questions at Cell Phone [phone].     MD face to face encounter    Documentation of Face to Face and  Certification for Home Health Services    I certify that patient: Dorothea Dugan is under my care and that I, or a nurse practitioner or physician's assistant working with me, had a face-to-face encounter that meets the physician face-to-face encounter requirements with this patient on: 7/17/2020.    This encounter with the patient was in whole, or in part, for the following medical condition, which is the primary reason for home health care: hip fracture.    I certify that, based on my findings, the following services are medically necessary home health services: Nursing, Occupational Therapy and Physical Therapy.    My clinical findings support the need for the above services because: Nurse is needed: For complex aftercare of surgical procedures because the patient needs instruction and cannot perform care on their own due to: hip fracture.., Occupational Therapy Services are needed to assess and treat cognitive ability and address ADL safety due to impairment in ADL's . and Physical Therapy Services are needed to assess and treat the following functional impairments: strength.    Further, I certify that my clinical findings support that this patient is homebound (i.e. absences from home require considerable and taxing effort and are for medical reasons or Mandae[employer] services or infrequently or of short duration when for other reasons) because: Leaving home is medically contraindicated for the following reason(s): Limited ambulation related to hip fracture..    Based on the above findings. I certify that this patient is confined to the home and needs intermittent skilled nursing care, physical therapy and/or speech therapy.  The patient is under my care, and I have initiated the establishment of the plan of care.  This patient will be followed by a physician who will periodically review the plan of care.  Physician/Provider to provide follow up care: Sun Virgen    Attending hospital physician (the Medicare  certified [facility] provider): Yovany Khan MD  Physician Signature: See electronic signature associated with these discharge orders.  Date: 7/17/2020     Walker Order    DME Documentation:   Describe the reason for need to support medical necessity: impaired gait.     I, the undersigned, certify that the above prescribed supplies are medically necessary for this patient and is both reasonable and necessary in reference to accepted standards of medical and necessary in reference to accepted standards of medical practice in the treatment of this patient's condition and is not prescribed as a convenience.     Diet    Follow this diet upon discharge: Orders Placed This Encounter      Advance Diet as Tolerated: Regular Diet Adult     Discharge Medications   Discharge Medication List as of 7/17/2020 11:36 AM      START taking these medications    Details   polyethylene glycol (MIRALAX) 17 g packet Take 17 g by mouth daily as needed for constipation, Disp-30 packet,R-0, E-Prescribe      acetaminophen (TYLENOL) 325 MG tablet Take 2 tablets (650 mg) by mouth every 6 hours as needed for pain, Disp-60 tablet,R-0, E-Prescribe      aspirin (ASA) 325 MG EC tablet Take 1 tablet (325 mg) by mouth daily, Disp-30 tablet,R-0, E-Prescribe      ibuprofen (ADVIL/MOTRIN) 600 MG tablet Take 1 tablet (600 mg) by mouth every 6 hours as needed for other (inflammatory pain), Disp-60 tablet,R-0, E-Prescribe      ondansetron (ZOFRAN-ODT) 4 MG ODT tab Take 1 tablet (4 mg) by mouth every 6 hours as needed for nausea or vomiting, Disp-10 tablet,R-0, E-Prescribe      oxyCODONE (ROXICODONE) 5 MG tablet Take 0.5-1 tablets (2.5-5 mg) by mouth every 4 hours as needed for moderate to severe pain (half tab for moderate pain, whole tab for severe pain), Disp-45 tablet,R-0, Local Print      senna-docusate (SENOKOT-S/PERICOLACE) 8.6-50 MG tablet Take 2 tablets by mouth 2 times daily as needed for constipation, Disp-45 tablet,R-0, E-Prescribe          CONTINUE these medications which have NOT CHANGED    Details   biotin 1000 MCG TABS tablet Take 500 mcg by mouth daily, Historical      escitalopram (LEXAPRO) 5 MG tablet Take 2.5 mg by mouth daily , Historical      Multiple Vitamin (MULTI VITAMIN DAILY PO) Take 1 tablet by mouth daily , Historical           Allergies   Allergies   Allergen Reactions     Strawberry      Penicillins Rash     Data   Most Recent 3 CBC's:  Recent Labs   Lab Test 07/16/20  0630 07/15/20  0659 07/13/20  2220   WBC  --  14.7* 17.7*   HGB 11.5* 10.6* 14.1   MCV  --   --  87   PLT  --   --  284      Most Recent 3 BMP's:  Recent Labs   Lab Test 07/13/20  2220      POTASSIUM 4.6   CHLORIDE 107   CO2 29   BUN 15   CR 0.83   ANIONGAP 3   TERENCE 10.4*   *     Most Recent 2 LFT's:No lab results found.  Most Recent INR's and Anticoagulation Dosing History:  Anticoagulation Dose History     Recent Dosing and Labs Latest Ref Rng & Units 7/13/2020    INR 0.86 - 1.14 1.02        Most Recent 3 Troponin's:No lab results found.  Most Recent Cholesterol Panel:No lab results found.  Most Recent 6 Bacteria Isolates From Any Culture (See EPIC Reports for Culture Details):No lab results found.  Most Recent TSH, T4 and A1c Labs:No lab results found.

## 2020-07-17 NOTE — PLAN OF CARE
Pt A/Ox4. VSS. On RA. CMS intact. Aquaceal dressing intact. Up w/ Ax1+walker. Voiding adequately per [facility]. Pain decreased with PRN Oxycodone and scheduled Tylenol. Plan to discharge home today.

## 2020-09-14 ENCOUNTER — TRANSFERRED RECORDS (OUTPATIENT)
Dept: HEALTH INFORMATION MANAGEMENT | Facility: CLINIC | Age: 75
End: 2020-09-14

## 2020-11-16 LAB
CREATININE (EXTERNAL): 0.76 MG/DL (ref 0.6–0.93)
GFR ESTIMATED (EXTERNAL): 77 ML/MIN/1.73M2
GFR ESTIMATED (IF AFRICAN AMERICAN) (EXTERNAL): 90 ML/MIN/1.73M2
GLUCOSE (EXTERNAL): 100 MG/DL (ref 65–99)
POTASSIUM (EXTERNAL): 4 MMOL/L (ref 3.5–5.3)
TSH SERPL-ACNC: 1.47 UIU/ML (ref 0.3–5)

## 2020-12-21 ENCOUNTER — HOSPITAL ENCOUNTER (OUTPATIENT)
Dept: NUCLEAR MEDICINE | Facility: CLINIC | Age: 75
Setting detail: NUCLEAR MEDICINE
End: 2020-12-21
Attending: SPECIALIST
Payer: MEDICARE

## 2020-12-21 DIAGNOSIS — E21.3 HYPERPARATHYROIDISM (H): ICD-10-CM

## 2020-12-21 PROCEDURE — A9500 TC99M SESTAMIBI: HCPCS | Performed by: RADIOLOGY

## 2020-12-21 PROCEDURE — 78072 PARATHYRD PLANAR W/SPECT&CT: CPT

## 2020-12-21 PROCEDURE — A9516 IODINE I-123 SOD IODIDE MIC: HCPCS | Performed by: RADIOLOGY

## 2020-12-21 PROCEDURE — 343N000001 HC RX 343: Performed by: RADIOLOGY

## 2020-12-21 RX ADMIN — Medication 782 UCI.: at 11:01

## 2020-12-21 RX ADMIN — Medication 25.5 MILLICURIE: at 13:08

## 2021-06-29 ENCOUNTER — TRANSFERRED RECORDS (OUTPATIENT)
Dept: HEALTH INFORMATION MANAGEMENT | Facility: CLINIC | Age: 76
End: 2021-06-29

## 2021-06-29 LAB
CREATININE (EXTERNAL): 0.73 MG/DL (ref 0.57–1)
GFR ESTIMATED (EXTERNAL): 81 ML/MIN/1.73M2
GFR ESTIMATED (IF AFRICAN AMERICAN) (EXTERNAL): 94 ML/MIN/1.73M2
GLUCOSE (EXTERNAL): 99 MG/DL (ref 65–99)
POTASSIUM (EXTERNAL): 3.9 MMOL/L (ref 3.5–5.2)

## 2021-07-07 ENCOUNTER — TELEPHONE (OUTPATIENT)
Dept: SURGERY | Facility: CLINIC | Age: 76
End: 2021-07-07

## 2021-07-07 NOTE — TELEPHONE ENCOUNTER
[facility] received a referral for patient to be seen for hyperparathyroidism.  Referring Dr. Malka Che.  Pt. Scheduling CT.    7/7/21 spoke with spouse, requested a call back tomorrow after 9:00am. cag    7/8/21 VM to call and schedule    7/13 VM to call and schedule cag

## 2021-07-14 ENCOUNTER — HOSPITAL ENCOUNTER (OUTPATIENT)
Dept: CT IMAGING | Facility: CLINIC | Age: 76
Discharge: HOME | End: 2021-07-14
Attending: SPECIALIST | Admitting: SPECIALIST
Payer: MEDICARE

## 2021-07-14 DIAGNOSIS — E21.3 HYPERPARATHYROIDISM (H): ICD-10-CM

## 2021-07-14 LAB
CREAT BLD-MCNC: 0.9 MG/DL (ref 0.5–1)
GFR SERPL CREATININE-BSD FRML MDRD: >60 ML/MIN/1.73M2

## 2021-07-14 PROCEDURE — 250N000009 HC RX 250: Performed by: RADIOLOGY

## 2021-07-14 PROCEDURE — 70492 CT SFT TSUE NCK W/O & W/DYE: CPT

## 2021-07-14 PROCEDURE — 250N000011 HC RX IP 250 OP 636: Performed by: RADIOLOGY

## 2021-07-14 PROCEDURE — 82565 ASSAY OF CREATININE: CPT

## 2021-07-14 RX ORDER — IOPAMIDOL 755 MG/ML
75 INJECTION, SOLUTION INTRAVASCULAR ONCE
Status: COMPLETED | OUTPATIENT
Start: 2021-07-14 | End: 2021-07-14

## 2021-07-14 RX ADMIN — SODIUM CHLORIDE 80 ML: 9 INJECTION, SOLUTION INTRAVENOUS at 09:03

## 2021-07-14 RX ADMIN — IOPAMIDOL 75 ML: 755 INJECTION, SOLUTION INTRAVENOUS at 09:03

## 2021-07-23 ENCOUNTER — OFFICE VISIT (OUTPATIENT)
Dept: SURGERY | Facility: CLINIC | Age: 76
End: 2021-07-23
Payer: MEDICARE

## 2021-07-23 VITALS
SYSTOLIC BLOOD PRESSURE: 128 MMHG | DIASTOLIC BLOOD PRESSURE: 64 MMHG | WEIGHT: 105 LBS | HEART RATE: 102 BPM | BODY MASS INDEX: 17.49 KG/M2 | OXYGEN SATURATION: 98 % | HEIGHT: 65 IN

## 2021-07-23 DIAGNOSIS — E21.3 HYPERPARATHYROIDISM (H): ICD-10-CM

## 2021-07-23 PROCEDURE — 99204 OFFICE O/P NEW MOD 45 MIN: CPT | Performed by: SURGERY

## 2021-07-23 ASSESSMENT — MIFFLIN-ST. JEOR: SCORE: 972.16

## 2021-07-26 PROBLEM — E21.3 HYPERPARATHYROIDISM (H): Status: ACTIVE | Noted: 2021-07-26

## 2021-07-26 NOTE — PROGRESS NOTES
[facility]         Mario Alberto Enriquez MD, MD    Dorothea Dugan MRN# [id number]   YOB: 1945 Age: 76 year old     PCP:  Sun Virgen    Chief Complaint: Hyperparathyroidism    Pt was seen in consultation from Sun Virgen.    History of Present Illness:  Dorothea Dugan is a 76 year old female who presented with hypercalcemia.  This was identified by her primary care doctor on routine screening.  Patient has a history of significant osteoporosis with probable pathologic fracture.  Her hypercalcemia was evaluated with a serum PTH which was elevated.  Most recent numbers were 11.2 calcium and PTH of 66.  She was seen by endocrinology and although her diagnosis of hyperparathyroidism was confirmed, imaging studies did not reveal any localization.  Follow-up scan obtained sometime later included a high resolution CT scan.  This suggested a right-sided parathyroid adenoma.  This was discrete from the thyroid and looked clinically suspicious.  Patient is quite debilitated and presents in a wheelchair.    PMH:  Dorothea Dugan  has a past medical history of Anxiety state, unspecified, Breast screening, unspecified, Insomnia, unspecified, Major depressive disorder, single episode, unspecified, Osteoporosis, unspecified, and Unspecified menopausal and postmenopausal disorder.  PSH:  Dorothea Dugan  has a past surgical history that includes ENT surgery; GYN surgery; Open reduction internal fixation ankle (Right, 2000); closed rx prox humerus fracture (Right, 2010); and Arthroplasty hip (Right, 7/14/2020).    Home medications and allergies reviewed.    Social History:  Dorothea Dugan  reports that she quit smoking about 43 years ago. Her smoking use included cigarettes. She started smoking about 54 years ago. She has a 0.20 pack-year smoking history. She has never used smokeless tobacco. She reports current alcohol use. She reports that she does not use  "drugs.  Family History:  Dorothea Dugan family history includes Lymphoma in her mother; Other - See Comments (age of onset: 95) in her mother; Prostate Cancer in her father.    ROS:  The 10 point Review of Systems is negative other than noted in the HPI.  Patient complains of diffuse body aches and pains.  Poor concentration and memory.  Poor appetite.    Physical Exam:  Blood pressure 128/64, pulse 102, height 1.651 m (5' 5\"), weight 47.6 kg (105 lb), SpO2 98 %.  105 lbs 0 oz  Cachectic malnourished female in no distress.  Presents in a wheelchair.    Patient has a pleasant affect and communicates well.   Pupils equal round and reactive to light.   No cervical lymphadenopathy or thyromegaly.  Adequate neck range of motion.  Skin creases sufficient for parathyroid surgery.  Lung fields clear, breathing comfortably.   Heart normal sinus rhythm.  No murmurs rubs or gallops.  Abdomen soft, nontender, nondistended.  Skin warm, dry.  No obvious rashes or lesions.    All new lab and imaging data was reviewed.  CT scan images reviewed, suspicious lesion in the right posterior neck.     Assessment/plan: 76-year-old female with severe osteoporosis and likely pathologic fracture and recent history of hyperparathyroidism.  Imaging studies were localizing suggesting a high likelihood of single gland parathyroid adenoma.  I did explain that 15% of cases of hyperparathyroidism might be attributable to dual adenomas or parathyroid hyperplasia.  I recommended a right-sided neck exploration with excision of parathyroid adenoma.  This could be performed as an outpatient but given her comorbidities, I would plan on keeping her in the hospital overnight.  This would also be performed with rapid PTH assay and the recurrent laryngeal nerve monitor.  I explained the risks of the surgery which include bleeding, infection, failure to treat, and hypocalcemia.  Patient was interested in getting this taken care of sometime in the near " future.  Surgical co-morbities include hyperparathyroidism, osteoporosis, malnutrition.    Enrike Enriquez M.D.  [facility]  [phone]    Please route or send letter to:  Primary Care Provider (PCP) and Referring Provider

## 2021-07-27 ENCOUNTER — PREP FOR PROCEDURE (OUTPATIENT)
Dept: SURGERY | Facility: CLINIC | Age: 76
End: 2021-07-27

## 2021-07-28 ENCOUNTER — PREP FOR PROCEDURE (OUTPATIENT)
Dept: SURGERY | Facility: CLINIC | Age: 76
End: 2021-07-28

## 2021-07-28 DIAGNOSIS — Z11.59 ENCOUNTER FOR SCREENING FOR OTHER VIRAL DISEASES: ICD-10-CM

## 2021-07-29 ENCOUNTER — TELEPHONE (OUTPATIENT)
Dept: SURGERY | Facility: CLINIC | Age: 76
End: 2021-07-29

## 2021-07-29 NOTE — TELEPHONE ENCOUNTER
Type of surgery: Right neck exploration,  Excision parathyroid adenoma  Location of surgery: [facility]  Date and time of surgery: 8/20/21 at 10am  Surgeon: Dr. Mario Alberto Enriquez  Pre-Op Appt Date: Patient to schedule  Post-Op Appt Date: Patient to schedule   Packet sent out: Yes  Pre-cert/Authorization completed:  Not Applicable  Date: 7/29/21

## 2021-08-12 ENCOUNTER — TRANSFERRED RECORDS (OUTPATIENT)
Dept: HEALTH INFORMATION MANAGEMENT | Facility: CLINIC | Age: 76
End: 2021-08-12

## 2021-08-12 LAB
CREATININE (EXTERNAL): 0.89 MG/DL (ref 0.57–1.11)
GFR ESTIMATED (EXTERNAL): >60 ML/MIN/1.73M2
GFR ESTIMATED (IF AFRICAN AMERICAN) (EXTERNAL): >60 ML/MIN/1.73M2
GLUCOSE (EXTERNAL): 108 MG/DL (ref 65–100)
POTASSIUM (EXTERNAL): 3.5 MMOL/L (ref 3.5–5)

## 2021-08-13 ENCOUNTER — TRANSFERRED RECORDS (OUTPATIENT)
Dept: HEALTH INFORMATION MANAGEMENT | Facility: CLINIC | Age: 76
End: 2021-08-13

## 2021-08-13 LAB
EJECTION FRACTION: 65 %
EJECTION FRACTION: NORMAL %

## 2021-08-16 ENCOUNTER — OFFICE VISIT (OUTPATIENT)
Dept: SURGERY | Facility: CLINIC | Age: 76
End: 2021-08-16
Payer: MEDICARE

## 2021-08-16 DIAGNOSIS — Z11.59 ENCOUNTER FOR SCREENING FOR OTHER VIRAL DISEASES: Primary | ICD-10-CM

## 2021-08-16 LAB — SARS-COV-2 RNA RESP QL NAA+PROBE: NEGATIVE

## 2021-08-16 PROCEDURE — 99207 PR NO CHARGE NURSE ONLY: CPT

## 2021-08-16 PROCEDURE — U0003 INFECTIOUS AGENT DETECTION BY NUCLEIC ACID (DNA OR RNA); SEVERE ACUTE RESPIRATORY SYNDROME CORONAVIRUS 2 (SARS-COV-2) (CORONAVIRUS DISEASE [COVID-19]), AMPLIFIED PROBE TECHNIQUE, MAKING USE OF HIGH THROUGHPUT TECHNOLOGIES AS DESCRIBED BY CMS-2020-01-R: HCPCS | Performed by: SURGERY

## 2021-08-16 NOTE — PROGRESS NOTES
Patient seen in clinic for asymptomatic Pre-Surgery COVID test.    Patient swabbed via Nasopharyngeal Swab.  Specimen brought to hospital lab for  .    Patient educated to self-quarantine from now until surgery.    Linda Abrams CMA on 8/16/2021 at 10:22 AM

## 2021-08-17 ENCOUNTER — TELEPHONE (OUTPATIENT)
Dept: SURGERY | Facility: CLINIC | Age: 76
End: 2021-08-17

## 2021-08-17 NOTE — TELEPHONE ENCOUNTER
Ed is calling wondering about dietary restrictions for Dorothea after RIGHT NECK EXPLORATION, EXCISION PARATHYROID ADENOMA surgery 8/20/21 [facility].  Please call    Phone: [phone]  Message ok

## 2021-08-17 NOTE — TELEPHONE ENCOUNTER
Left message for Ed informing him that generally there are not any specific dietary restrictions.    It is usually recommend to start at with liquid diet after surgery and to advance as tolerated.    Encouraged call back with any additional questions or concerns.    Juli Xavier RN-BSN

## 2021-08-19 ENCOUNTER — ANESTHESIA EVENT (OUTPATIENT)
Dept: SURGERY | Facility: CLINIC | Age: 76
End: 2021-08-19
Payer: MEDICARE

## 2021-08-19 RX ORDER — MULTIVIT-MIN/IRON/FOLIC ACID/K 18-600-40
25 CAPSULE ORAL DAILY
COMMUNITY

## 2021-08-20 ENCOUNTER — HOSPITAL ENCOUNTER (OUTPATIENT)
Facility: CLINIC | Age: 76
Discharge: HOME | End: 2021-08-20
Attending: SURGERY | Admitting: SURGERY
Payer: MEDICARE

## 2021-08-20 ENCOUNTER — APPOINTMENT (OUTPATIENT)
Dept: SURGERY | Facility: PHYSICIAN GROUP | Age: 76
End: 2021-08-20
Payer: MEDICARE

## 2021-08-20 ENCOUNTER — ANESTHESIA (OUTPATIENT)
Dept: SURGERY | Facility: CLINIC | Age: 76
End: 2021-08-20
Payer: MEDICARE

## 2021-08-20 ENCOUNTER — SURGERY (OUTPATIENT)
Age: 76
End: 2021-08-20
Payer: MEDICARE

## 2021-08-20 VITALS
HEART RATE: 105 BPM | SYSTOLIC BLOOD PRESSURE: 141 MMHG | HEIGHT: 63 IN | OXYGEN SATURATION: 95 % | DIASTOLIC BLOOD PRESSURE: 83 MMHG | TEMPERATURE: 98.7 F | RESPIRATION RATE: 18 BRPM | WEIGHT: 101.9 LBS | BODY MASS INDEX: 18.05 KG/M2

## 2021-08-20 DIAGNOSIS — Z11.59 ENCOUNTER FOR SCREENING FOR OTHER VIRAL DISEASES: ICD-10-CM

## 2021-08-20 DIAGNOSIS — G89.18 ACUTE POST-OPERATIVE PAIN: Primary | ICD-10-CM

## 2021-08-20 DIAGNOSIS — E21.3 HYPERPARATHYROIDISM (H): ICD-10-CM

## 2021-08-20 LAB
PTH-INTACT SERPL-MCNC: 18 PG/ML (ref 18–80)
PTH-INTACT SERPL-MCNC: 30 PG/ML (ref 18–80)
PTH-INTACT SERPL-MCNC: 67 PG/ML (ref 18–80)

## 2021-08-20 PROCEDURE — 250N000011 HC RX IP 250 OP 636: Performed by: NURSE ANESTHETIST, CERTIFIED REGISTERED

## 2021-08-20 PROCEDURE — 250N000009 HC RX 250: Performed by: SURGERY

## 2021-08-20 PROCEDURE — 60500 EXPLORE PARATHYROID GLANDS: CPT | Performed by: SURGERY

## 2021-08-20 PROCEDURE — 710N000009 HC RECOVERY PHASE 1, LEVEL 1, PER MIN: Performed by: SURGERY

## 2021-08-20 PROCEDURE — 258N000003 HC RX IP 258 OP 636

## 2021-08-20 PROCEDURE — 370N000017 HC ANESTHESIA TECHNICAL FEE, PER MIN: Performed by: SURGERY

## 2021-08-20 PROCEDURE — 250N000011 HC RX IP 250 OP 636

## 2021-08-20 PROCEDURE — 83970 ASSAY OF PARATHORMONE: CPT | Performed by: SURGERY

## 2021-08-20 PROCEDURE — 999N000141 HC STATISTIC PRE-PROCEDURE NURSING ASSESSMENT: Performed by: SURGERY

## 2021-08-20 PROCEDURE — 360N000077 HC SURGERY LEVEL 4, PER MIN: Performed by: SURGERY

## 2021-08-20 PROCEDURE — 60500 EXPLORE PARATHYROID GLANDS: CPT | Mod: AS | Performed by: PHYSICIAN ASSISTANT

## 2021-08-20 PROCEDURE — 250N000013 HC RX MED GY IP 250 OP 250 PS 637: Performed by: PHYSICIAN ASSISTANT

## 2021-08-20 PROCEDURE — 250N000025 HC SEVOFLURANE, PER MIN: Performed by: SURGERY

## 2021-08-20 PROCEDURE — 250N000009 HC RX 250: Performed by: NURSE ANESTHETIST, CERTIFIED REGISTERED

## 2021-08-20 PROCEDURE — 272N000001 HC OR GENERAL SUPPLY STERILE: Performed by: SURGERY

## 2021-08-20 PROCEDURE — 36415 COLL VENOUS BLD VENIPUNCTURE: CPT | Performed by: SURGERY

## 2021-08-20 PROCEDURE — 250N000009 HC RX 250

## 2021-08-20 PROCEDURE — 258N000003 HC RX IP 258 OP 636: Performed by: NURSE ANESTHETIST, CERTIFIED REGISTERED

## 2021-08-20 PROCEDURE — 710N000012 HC RECOVERY PHASE 2, PER MINUTE: Performed by: SURGERY

## 2021-08-20 PROCEDURE — 88331 PATH CONSLTJ SURG 1 BLK 1SPC: CPT | Mod: TC | Performed by: SURGERY

## 2021-08-20 RX ORDER — HYDROCODONE BITARTRATE AND ACETAMINOPHEN 5; 325 MG/1; MG/1
1 TABLET ORAL EVERY 4 HOURS PRN
Qty: 10 TABLET | Refills: 0 | Status: SHIPPED | OUTPATIENT
Start: 2021-08-20 | End: 2021-08-23

## 2021-08-20 RX ORDER — SODIUM CHLORIDE, SODIUM LACTATE, POTASSIUM CHLORIDE, CALCIUM CHLORIDE 600; 310; 30; 20 MG/100ML; MG/100ML; MG/100ML; MG/100ML
INJECTION, SOLUTION INTRAVENOUS CONTINUOUS PRN
Status: DISCONTINUED | OUTPATIENT
Start: 2021-08-20 | End: 2021-08-20

## 2021-08-20 RX ORDER — HYDROCODONE BITARTRATE AND ACETAMINOPHEN 5; 325 MG/1; MG/1
1 TABLET ORAL
Status: COMPLETED | OUTPATIENT
Start: 2021-08-20 | End: 2021-08-20

## 2021-08-20 RX ORDER — BUPIVACAINE HYDROCHLORIDE AND EPINEPHRINE 2.5; 5 MG/ML; UG/ML
INJECTION, SOLUTION EPIDURAL; INFILTRATION; INTRACAUDAL; PERINEURAL
Status: DISCONTINUED
Start: 2021-08-20 | End: 2021-08-20 | Stop reason: HOSPADM

## 2021-08-20 RX ORDER — MAGNESIUM HYDROXIDE 1200 MG/15ML
LIQUID ORAL PRN
Status: DISCONTINUED | OUTPATIENT
Start: 2021-08-20 | End: 2021-08-20 | Stop reason: HOSPADM

## 2021-08-20 RX ORDER — CEFAZOLIN SODIUM 2 G/100ML
INJECTION, SOLUTION INTRAVENOUS PRN
Status: DISCONTINUED | OUTPATIENT
Start: 2021-08-20 | End: 2021-08-20

## 2021-08-20 RX ORDER — DEXAMETHASONE SODIUM PHOSPHATE 4 MG/ML
INJECTION, SOLUTION INTRA-ARTICULAR; INTRALESIONAL; INTRAMUSCULAR; INTRAVENOUS; SOFT TISSUE PRN
Status: DISCONTINUED | OUTPATIENT
Start: 2021-08-20 | End: 2021-08-20

## 2021-08-20 RX ORDER — SODIUM CHLORIDE, SODIUM LACTATE, POTASSIUM CHLORIDE, CALCIUM CHLORIDE 600; 310; 30; 20 MG/100ML; MG/100ML; MG/100ML; MG/100ML
INJECTION, SOLUTION INTRAVENOUS CONTINUOUS
Status: DISCONTINUED | OUTPATIENT
Start: 2021-08-20 | End: 2021-08-20 | Stop reason: HOSPADM

## 2021-08-20 RX ORDER — ONDANSETRON 4 MG/1
4 TABLET, ORALLY DISINTEGRATING ORAL EVERY 30 MIN PRN
Status: DISCONTINUED | OUTPATIENT
Start: 2021-08-20 | End: 2021-08-20 | Stop reason: HOSPADM

## 2021-08-20 RX ORDER — FENTANYL CITRATE 50 UG/ML
INJECTION, SOLUTION INTRAMUSCULAR; INTRAVENOUS PRN
Status: DISCONTINUED | OUTPATIENT
Start: 2021-08-20 | End: 2021-08-20

## 2021-08-20 RX ORDER — PROPOFOL 10 MG/ML
INJECTION, EMULSION INTRAVENOUS PRN
Status: DISCONTINUED | OUTPATIENT
Start: 2021-08-20 | End: 2021-08-20

## 2021-08-20 RX ORDER — OXYCODONE HYDROCHLORIDE 5 MG/1
5 TABLET ORAL EVERY 4 HOURS PRN
Status: DISCONTINUED | OUTPATIENT
Start: 2021-08-20 | End: 2021-08-20 | Stop reason: HOSPADM

## 2021-08-20 RX ORDER — AMOXICILLIN 250 MG
1-2 CAPSULE ORAL 2 TIMES DAILY
Qty: 15 TABLET | Refills: 0 | Status: SHIPPED | OUTPATIENT
Start: 2021-08-20 | End: 2024-03-11

## 2021-08-20 RX ORDER — ONDANSETRON 2 MG/ML
4 INJECTION INTRAMUSCULAR; INTRAVENOUS EVERY 30 MIN PRN
Status: DISCONTINUED | OUTPATIENT
Start: 2021-08-20 | End: 2021-08-20 | Stop reason: HOSPADM

## 2021-08-20 RX ORDER — ONDANSETRON 2 MG/ML
INJECTION INTRAMUSCULAR; INTRAVENOUS PRN
Status: DISCONTINUED | OUTPATIENT
Start: 2021-08-20 | End: 2021-08-20

## 2021-08-20 RX ORDER — EPHEDRINE SULFATE 50 MG/ML
INJECTION, SOLUTION INTRAMUSCULAR; INTRAVENOUS; SUBCUTANEOUS PRN
Status: DISCONTINUED | OUTPATIENT
Start: 2021-08-20 | End: 2021-08-20

## 2021-08-20 RX ORDER — BUPIVACAINE HYDROCHLORIDE AND EPINEPHRINE 2.5; 5 MG/ML; UG/ML
INJECTION, SOLUTION INFILTRATION; PERINEURAL PRN
Status: DISCONTINUED | OUTPATIENT
Start: 2021-08-20 | End: 2021-08-20 | Stop reason: HOSPADM

## 2021-08-20 RX ORDER — LIDOCAINE HYDROCHLORIDE 20 MG/ML
INJECTION, SOLUTION INFILTRATION; PERINEURAL PRN
Status: DISCONTINUED | OUTPATIENT
Start: 2021-08-20 | End: 2021-08-20

## 2021-08-20 RX ADMIN — SODIUM CHLORIDE, POTASSIUM CHLORIDE, SODIUM LACTATE AND CALCIUM CHLORIDE: 600; 310; 30; 20 INJECTION, SOLUTION INTRAVENOUS at 10:08

## 2021-08-20 RX ADMIN — LIDOCAINE HYDROCHLORIDE 60 MG: 20 INJECTION, SOLUTION INFILTRATION; PERINEURAL at 10:18

## 2021-08-20 RX ADMIN — Medication 5 MG: at 10:34

## 2021-08-20 RX ADMIN — PHENYLEPHRINE HYDROCHLORIDE 100 MCG: 10 INJECTION INTRAVENOUS at 10:34

## 2021-08-20 RX ADMIN — Medication 5 MG: at 11:10

## 2021-08-20 RX ADMIN — ONDANSETRON 4 MG: 2 INJECTION INTRAMUSCULAR; INTRAVENOUS at 12:08

## 2021-08-20 RX ADMIN — PROPOFOL 150 MG: 10 INJECTION, EMULSION INTRAVENOUS at 10:18

## 2021-08-20 RX ADMIN — BUPIVACAINE HYDROCHLORIDE AND EPINEPHRINE BITARTRATE 2 ML: 2.5; .005 INJECTION, SOLUTION EPIDURAL; INFILTRATION; INTRACAUDAL; PERINEURAL at 11:37

## 2021-08-20 RX ADMIN — CEFAZOLIN SODIUM 2 G: 2 INJECTION, SOLUTION INTRAVENOUS at 10:37

## 2021-08-20 RX ADMIN — DEXMEDETOMIDINE HYDROCHLORIDE 8 MCG: 100 INJECTION, SOLUTION INTRAVENOUS at 10:58

## 2021-08-20 RX ADMIN — DEXAMETHASONE SODIUM PHOSPHATE 4 MG: 4 INJECTION, SOLUTION INTRA-ARTICULAR; INTRALESIONAL; INTRAMUSCULAR; INTRAVENOUS; SOFT TISSUE at 10:35

## 2021-08-20 RX ADMIN — SUCCINYLCHOLINE CHLORIDE 40 MG: 20 INJECTION, SOLUTION INTRAMUSCULAR; INTRAVENOUS; PARENTERAL at 10:18

## 2021-08-20 RX ADMIN — FENTANYL CITRATE 100 MCG: 50 INJECTION, SOLUTION INTRAMUSCULAR; INTRAVENOUS at 10:18

## 2021-08-20 RX ADMIN — SODIUM CHLORIDE 1000 ML: 900 IRRIGANT IRRIGATION at 10:39

## 2021-08-20 RX ADMIN — DEXMEDETOMIDINE HYDROCHLORIDE 12 MCG: 100 INJECTION, SOLUTION INTRAVENOUS at 10:55

## 2021-08-20 RX ADMIN — PHENYLEPHRINE HYDROCHLORIDE 100 MCG: 10 INJECTION INTRAVENOUS at 11:10

## 2021-08-20 RX ADMIN — HYDROCODONE BITARTRATE AND ACETAMINOPHEN 0.5 TABLET: 5; 325 TABLET ORAL at 13:35

## 2021-08-20 RX ADMIN — MIDAZOLAM 1 MG: 1 INJECTION INTRAMUSCULAR; INTRAVENOUS at 10:08

## 2021-08-20 ASSESSMENT — LIFESTYLE VARIABLES: TOBACCO_USE: 0

## 2021-08-20 ASSESSMENT — MIFFLIN-ST. JEOR: SCORE: 926.35

## 2021-08-20 NOTE — DISCHARGE INSTRUCTIONS
[facility]  Discharge Instructions: Post-Operative Parathyroid Surgery    ACTIVITY    Take frequent, short walks and increase your activity gradually.      Avoid strenuous physical activity or heavy lifting greater than 15-20 lbs. for 1-2 weeks.  You may climb stairs.    You may drive without restrictions when you are not using any prescription pain medication and feel comfortable in a car.    You may return to work/school when you are comfortable without any prescription pain medication.    WOUND CARE    You may remove your bandage and shower 48 hours after the surgery.  Pat your incision dry and leave it open to air.  Re-apply dressing (Band-Aids or gauze/tape) as needed for comfort or drainage.    You may have steri-strips (looks like white tape) on your incision.  You may peel off the steri-strips 2 weeks after your surgery if they have not peeled off on their own.     Do not soak your incision in a tub or pool for 2 weeks.     Do not apply any lotions, creams, or ointments to your incision.    A ridge under your incision is normal and will gradually resolve.    DIET    Start with liquids, then gradually resume your regular diet as tolerated.      Drink plenty of fluids to stay hydrated.    PAIN    Expect some tenderness and discomfort at the incision site(s).  Use the prescribed pain medication at your discretion.  Expect gradual resolution of your pain over several days.    You may take ibuprofen with food (unless you have been told not to) or ***acetaminophen/Tylenol instead of or in addition to your prescribed pain medication.  If you are taking Norco or Percocet, do not take any additional acetaminophen/Tylenol.    Do not drink alcohol or drive while you are taking pain medications.    You may apply ice to your incisions in 20 minute intervals as needed for the next 48 hours.  After that time, consider switching to heat if you prefer.    Watch for symptoms of numbness or  tingling around the mouth or in the fingers or toes.  This may be a sign of a low calcium level.  Please contact the office so we can evaluate your symptoms.  You may need to have your blood calcium level checked by doing a simple blood test.    EXPECTATIONS    Pain medications can cause constipation.  Limit use when possible.  Take over the counter stool softener/stimulant, such as Colace or Senna, 1-2 times a day with plenty of water.  You may take a mild over the counter laxative, such as Miralax or a suppository, as needed.      You may discontinue these medications once you are having regular bowel movements and/or are no longer taking your narcotic pain medication.    RETURN APPOINTMENT    Follow up with your surgeon in 2 weeks.  Please call our office at [phone] to schedule your appointment.  We are located at [address] [place] [address].    CALL OUR OFFICE [phone] IF YOU HAVE:     Chills or fever above 101 F.    Increased redness, warmth, or drainage at your incisions.    Significant bleeding.    Pain not relieved by your pain medication or rest.    Increasing pain after the first 48 hours.    Any other concerns or questions.           Revised September 2020      Same Day Surgery Discharge Instructions for  Sedation and General Anesthesia       It's not unusual to feel dizzy, light-headed or faint for up to 24 hours after surgery or while taking pain medication.  If you have these symptoms: sit for a few minutes before standing and have someone assist you when you get up to walk or use the bathroom.      You should rest and relax for the next 24 hours. We recommend you make arrangements to have an adult stay with you for at least 24 hours after your discharge.  Avoid hazardous and strenuous activity.      DO NOT DRIVE any vehicle or operate mechanical equipment for 24 hours following the end of your surgery.  Even though you may feel normal, your reactions may be affected  by the medication you have received.      Do not drink alcoholic beverages for 24 hours following surgery.       Slowly progress to your regular diet as you feel able. It's not unusual to feel nauseated and/or vomit after receiving anesthesia.  If you develop these symptoms, drink clear liquids (apple juice, ginger ale, broth, 7-up, etc. ) until you feel better.  If your nausea and vomiting persists for 24 hours, please notify your surgeon.        All narcotic pain medications, along with inactivity and anesthesia, can cause constipation. Drinking plenty of liquids and increasing fiber intake will help.      For any questions of a medical nature, call your surgeon.      Do not make important decisions for 24 hours.      If you had general anesthesia, you may have a sore throat for a couple of days related to the breathing tube used during surgery.  You may use Cepacol lozenges to help with this discomfort.  If it worsens or if you develop a fever, contact your surgeon.       If you feel your pain is not well managed with the pain medications prescribed by your surgeon, please contact your surgeon's office to let them know so they can address your concerns.       CoVid 19 Information    We want to give you information regarding Covid. Please consult your primary care provider with any questions you might have.     Patient who have symptoms (cough, fever, or shortness of breath), need to isolate for 7 days from when symptoms started OR 72 hours after fever resolves (without fever reducing medications) AND improvement of respiratory symptoms (whichever is longer).      Isolate yourself at home (in own room/own bathroom if possible)    Do Not allow any visitors    Do Not go to work or school    Do Not go to Zoroastrianism,  centers, shopping, or other public places.    Do Not shake hands.    Avoid close and intimate contact with others (hugging, kissing).    Follow CDC recommendations for household cleaning of  frequently touched services.     After the initial 7 days, continue to isolate yourself from household members as much as possible. To continue decrease the risk of community spread and exposure, you and any members of your household should limit activities in public for 14 days after starting home isolation.     You can reference the following CDC link for helpful home isolation/care tips:  https://www.cdc.gov/coronavirus/2019-ncov/downloads/10Things.pdf    Protect Others:    Cover Your Mouth and Nose with a mask, disposable tissue or wash cloth to avoid spreading germs to others.    Wash your hands and face frequently with soap and water    Call Your Primary Doctor If: Breathing difficulty develops or you become worse.    For more information about COVID19 and options for caring for yourself at home, please visit the CDC website at https://www.cdc.gov/coronavirus/2019-ncov/about/steps-when-sick.html  For more options for care at [facility], please visit our website at [email or web address]      **If you have concerns or questions about your procedure,    please contact Dr Enriquez at  [phone]**

## 2021-08-20 NOTE — ANESTHESIA CARE TRANSFER NOTE
Patient: Dorothea Dugan    Procedure(s):  RIGHT NECK EXPLORATION, EXCISION PARATHYROID ADENOMA    Diagnosis: Hyperparathyroidism (H) [E21.3]  Diagnosis Additional Information: No value filed.    Anesthesia Type:   General     Note:    Oropharynx: oropharynx clear of all foreign objects  Level of Consciousness: awake  Oxygen Supplementation: face mask  Level of Supplemental Oxygen (L/min / FiO2): 6  Independent Airway: airway patency satisfactory and stable  Dentition: dentition unchanged  Vital Signs Stable: post-procedure vital signs reviewed and stable  Report to RN Given: handoff report given  Patient transferred to: PACU    Handoff Report: Identifed the Patient, Identified the Reponsible Provider, Reviewed the pertinent medical history, Discussed the surgical course, Reviewed Intra-OP anesthesia mangement and issues during anesthesia, Set expectations for post-procedure period and Allowed opportunity for questions and acknowledgement of understanding      Vitals:  Vitals Value Taken Time   /86    Temp     Pulse 83    Resp 12    SpO2 100        Electronically Signed By: ROXANNE Yates CRNA  August 20, 2021  12:20 PM

## 2021-08-20 NOTE — ANESTHESIA PROCEDURE NOTES
Airway       Patient location during procedure: OR       Procedure Start/Stop Times: 8/20/2021 10:20 AM  Staff -        Anesthesiologist:  Jacki Colmenares       CRNA: Yovany Goldberg APRN CRNA       Other Anesthesia Staff: Kamar Joy       Performed By: SAMANTHA  Consent for Airway        Urgency: elective  Indications and Patient Condition       Indications for airway management: anna-procedural       Induction type:intravenous       Mask difficulty assessment: 1 - vent by mask    Final Airway Details       Final airway type: endotracheal airway       Successful airway: NIM  Endotracheal Airway Details        ETT size (mm): 7.0       Cuffed: yes       Successful intubation technique: video laryngoscopy       VL Blade Size: Glidescope 3       Grade View of Cords: 1       Adjucts: stylet       Position: Right       Measured from: gums/teeth       Secured at (cm): 22       Bite block used: None    Post intubation assessment        Placement verified by: capnometry, equal breath sounds and chest rise        Number of attempts at approach: 1       Number of other approaches attempted: 0       Secured with: pink tape and plastic tape       Ease of procedure: easy       Dentition: Intact and Unchanged

## 2021-08-20 NOTE — ANESTHESIA PREPROCEDURE EVALUATION
Anesthesia Pre-Procedure Evaluation    Patient: Dorothea Dugan   MRN: [id number] : 1945        Preoperative Diagnosis: Hyperparathyroidism (H) [E21.3]   Procedure : Procedure(s):  RIGHT NECK EXPLORATION, EXCISION PARATHYROID ADENOMA     Past Medical History:   Diagnosis Date     Anxiety      Anxiety state, unspecified      Breast screening, unspecified      Fracture of head of femur (H)      Heart murmur      Insomnia, unspecified      Major depressive disorder, single episode, unspecified      Osteoporosis, unspecified      Parathyroid adenoma      Unspecified menopausal and postmenopausal disorder       Past Surgical History:   Procedure Laterality Date     ARTHROPLASTY HIP Right 2020    Procedure: RIGHT TOTAL HIP ARTHROPLASTY.;  Surgeon: Luis Felipe Christopher MD;  Location: [facility] OR     CLOSED RX PROX HUMERUS FRACTURE Right      ENT SURGERY      oral surgery 2014     GYN SURGERY           OPEN REDUCTION INTERNAL FIXATION ANKLE Right       Allergies   Allergen Reactions     Strawberry      Pcn [Penicillins] Rash      Social History     Tobacco Use     Smoking status: Former Smoker     Packs/day: 0.10     Years: 2.00     Pack years: 0.20     Types: Cigarettes     Start date:      Quit date:      Years since quittin.6     Smokeless tobacco: Never Used   Substance Use Topics     Alcohol use: Yes     Comment: 2-4 drinks per week      Wt Readings from Last 1 Encounters:   21 46.2 kg (101 lb 14.4 oz)        Anesthesia Evaluation   Pt has had prior anesthetic.     No history of anesthetic complications       ROS/MED HX  ENT/Pulmonary:    (-) tobacco use, asthma and sleep apnea   Neurologic: Comment: Generalized weakness, uses a walker      Cardiovascular:     (+) -----valvular problems/murmurs     METS/Exercise Tolerance:     Hematologic:       Musculoskeletal:       GI/Hepatic:    (-) GERDLiver disease: echo negative, positive murmur.   Renal/Genitourinary:       Endo:        Psychiatric/Substance Use:       Infectious Disease:       Malignancy:       Other:            Physical Exam    Airway        Mallampati: I   TM distance: > 3 FB   Neck ROM: full   Mouth opening: > 3 cm    Respiratory Devices and Support         Dental  no notable dental history         Cardiovascular   cardiovascular exam normal          Pulmonary   pulmonary exam normal                OUTSIDE LABS:  CBC:   Lab Results   Component Value Date    WBC 14.7 (H) 07/15/2020    WBC 17.7 (H) 07/13/2020    HGB 11.5 (L) 07/16/2020    HGB 10.6 (L) 07/15/2020    HCT 41.1 07/13/2020     07/13/2020     BMP:   Lab Results   Component Value Date     07/13/2020    POTASSIUM 4.6 07/13/2020    CHLORIDE 107 07/13/2020    CO2 29 07/13/2020    BUN 15 07/13/2020    CR 0.9 07/14/2021    CR 0.83 07/13/2020     (H) 07/13/2020     COAGS:   Lab Results   Component Value Date    INR 1.02 07/13/2020     POC:   Lab Results   Component Value Date    BGM 95 07/15/2020     HEPATIC: No results found for: ALBUMIN, PROTTOTAL, ALT, AST, GGT, ALKPHOS, BILITOTAL, BILIDIRECT, BRUCE  OTHER:   Lab Results   Component Value Date    TERENCE 10.4 (H) 07/13/2020       Anesthesia Plan    ASA Status:  2      Anesthesia Type: General.     - Airway: ETT   Induction: Intravenous.   Maintenance: Balanced.        Consents    Anesthesia Plan(s) and associated risks, benefits, and realistic alternatives discussed. Questions answered and patient/representative(s) expressed understanding.     - Discussed with:  Patient         Postoperative Care    Pain management: IV analgesics, Oral pain medications.   PONV prophylaxis: Ondansetron (or other 5HT-3), Dexamethasone or Solumedrol     Comments:                Jacki Colmenares

## 2021-08-20 NOTE — ANESTHESIA POSTPROCEDURE EVALUATION
Patient: Dorothea Dguan    Procedure(s):  RIGHT NECK EXPLORATION, EXCISION PARATHYROID ADENOMA    Diagnosis:Hyperparathyroidism (H) [E21.3]  Diagnosis Additional Information: No value filed.    Anesthesia Type:  General    Note:  Disposition: Outpatient   Postop Pain Control: Uneventful            Sign Out: Well controlled pain   PONV: No   Neuro/Psych: Uneventful            Sign Out: Acceptable/Baseline neuro status   Airway/Respiratory: Uneventful            Sign Out: Acceptable/Baseline resp. status   CV/Hemodynamics: Uneventful            Sign Out: Acceptable CV status; No obvious hypovolemia; No obvious fluid overload   Other NRE: NONE   DID A NON-ROUTINE EVENT OCCUR?            Last vitals:  Vitals Value Taken Time   /86 08/20/21 1330   Temp 36.8  C (98.2  F) 08/20/21 1219   Pulse 102 08/20/21 1345   Resp 15 08/20/21 1345   SpO2 96 % 08/20/21 1345   Vitals shown include unvalidated device data.    Electronically Signed By: Jacki Colmenares  August 20, 2021  3:03 PM

## 2021-08-20 NOTE — OP NOTE
General Surgery Operative Note    PREOPERATIVE DIAGNOSIS:  Primary hyperparathyroidism.    POSTOPERATIVE DIAGNOSIS:  Primary hyperparathyroidism.    PROCEDURE:   Excision of right superior parathyroid adenoma, Unilateral neck exploration.    ANESTHESIA:  General.    PREOPERATIVE MEDICATIONS:  Ancef IV.    SURGEON:  Mario Alberto Enriquez MD, MD    ASSISTANT:  Airam Alberto PA-C.  First assistant was necessary due to challenging exposure and the need for improved visualization and help maintaining hemostasis.      ESTIMATED BLOOD LOSS:  5 cc's    INDICATIONS:  Dorothea Dugan is a 76 year old female who has primary hyperparathyroidism. She has had symptoms of muscle pain and osteoporosis.  She has been found to have an elevated calcium of 11.2.  She has a localizing CT in the right middle neck.  She presents today for neck exploration, excision of parathyroid adenoma.  Her PTH preoperatively is 67.    DESCRIPTION OF PROCEDURE:  The patient was placed supine, head and neck in extension and a bump between the scapulae.  Transverse cervical neck creases had been marked in the preinduction area and the one most suitable was utilized for exposure.  Incision was made, superior and inferior skin flaps raised.  Midline fascia opened and reflected to the right.  An abnormal parathyroid was identified at the right superior location.  It was meticulously dissected from the surrounding tissue and submitted for frozen section which confirmed a hypercellular parathyroid.  We then explored the right inferior neck.  During the exploration, I encountered what I believe to be the second parathyroid. It was not biopsied but appeared to represent normal parathyroid tissue. The site was irrigated and inspected for hemostasis.  The PTH assays were sent at 15 and 25 minutes post-excision and the first value came back at 18, signifying the completeness of the dissection.  The patient's incision site was then closed with running 3-0 Vicryl for  the midline fascia, interrupted for platysma and 4-0 subcuticular Monocryl for skin.  Marcaine 0.25% plain was instilled and the patient transferred to recovery in good condition.    INTRAOPERATIVE FINDINGS:  1.  A hypercellular right inferior parathyroid correlated nicely with sestamibi scan.  2.  Second right-sided parathyroid appeared to be grossly normal.  3.  PTH assay diminished from 67 to 18 at 15 minutes post excision.  4.  Grossly normal thyroid.    Specimens:   ID Type Source Tests Collected by Time Destination   1 : RIGHT SUPERIOR PARATHYROID Tissue Parathyroid, Superior, Right SURGICAL PATHOLOGY EXAM Mario Alberto Enriquez MD 8/20/2021 11:03 AM        Mario Alberto Enriquez MD, MD

## 2021-08-23 LAB
PATH REPORT.COMMENTS IMP SPEC: NORMAL
PATH REPORT.FINAL DX SPEC: NORMAL
PATH REPORT.GROSS SPEC: NORMAL
PATH REPORT.INTRAOP OBS SPEC DOC: NORMAL
PATH REPORT.MICROSCOPIC SPEC OTHER STN: NORMAL
PATH REPORT.RELEVANT HX SPEC: NORMAL
PHOTO IMAGE: NORMAL

## 2021-08-23 PROCEDURE — 88331 PATH CONSLTJ SURG 1 BLK 1SPC: CPT | Mod: 26 | Performed by: PATHOLOGY

## 2021-08-23 PROCEDURE — 88305 TISSUE EXAM BY PATHOLOGIST: CPT | Mod: 26 | Performed by: PATHOLOGY

## 2021-09-02 ENCOUNTER — OFFICE VISIT (OUTPATIENT)
Dept: SURGERY | Facility: CLINIC | Age: 76
End: 2021-09-02
Payer: MEDICARE

## 2021-09-02 DIAGNOSIS — Z09 SURGERY FOLLOW-UP EXAMINATION: Primary | ICD-10-CM

## 2021-09-02 PROCEDURE — 99024 POSTOP FOLLOW-UP VISIT: CPT | Performed by: SURGERY

## 2021-09-02 NOTE — LETTER
September 3, 2021          No referring provider defined for this encounter.      RE:   Dorothea Dugan 1945      Dear Colleague,    Thank you for referring your patient, Dorothea Dugan, to [facility] at [facility]. Please see a copy of my visit note below.    Surgery Postop Note     Dorothea Dugan presents today for surgical followup.  she is doing well following right neck exploration, excision of parathyroid adenoma.  Incisions look fine with no signs of wound infection.      Final pathology revealed a 570 mg parathyroid adenoma.  PTH dropped from 67 prior to surgery to 18 after removal of the adenoma.  She feels significantly better than before surgery.  She wants to increase her level of activity which I endorsed.  I have recommended that she start a calcium and vitamin D replacement.  I expect her to make a complete recovery.     Again, thank you for allowing me to participate in the care of your patient.      Sincerely,      Mario Alberto Enriquez MD

## 2021-09-02 NOTE — PROGRESS NOTES
Surgery Postop Note    Dorothea Dugan presents today for surgical followup.  she is doing well following right neck exploration, excision of parathyroid adenoma.  Incisions look fine with no signs of wound infection.  Final pathology revealed a 570 mg parathyroid adenoma.  PTH dropped from 67 prior to surgery to 18 after removal of the adenoma.  She feels significantly better than before surgery.  She wants to increase her level of activity which I endorsed.  I have recommended that she start a calcium and vitamin D replacement.  I expect her to make a complete recovery.  Thank you for the opportunity to help in her care.    Enrike Enriquez M.D.  [facility]  [phone]    Please route or send letter to:  Primary Care Provider (PCP) and Referring Provider    Please send Dr. Che a copy of the operative note and the pathology report.

## 2021-09-04 ENCOUNTER — HEALTH MAINTENANCE LETTER (OUTPATIENT)
Age: 76
End: 2021-09-04

## 2022-10-22 ENCOUNTER — HEALTH MAINTENANCE LETTER (OUTPATIENT)
Age: 77
End: 2022-10-22

## 2023-11-04 ENCOUNTER — HEALTH MAINTENANCE LETTER (OUTPATIENT)
Age: 78
End: 2023-11-04

## 2024-03-11 ENCOUNTER — APPOINTMENT (OUTPATIENT)
Dept: CT IMAGING | Facility: CLINIC | Age: 79
DRG: 565 | End: 2024-03-11
Attending: EMERGENCY MEDICINE
Payer: MEDICARE

## 2024-03-11 ENCOUNTER — HOSPITAL ENCOUNTER (INPATIENT)
Facility: CLINIC | Age: 79
LOS: 3 days | Discharge: NURSING, REHAB OR LONG-TERM CARE | DRG: 565 | End: 2024-03-15
Attending: EMERGENCY MEDICINE | Admitting: STUDENT IN AN ORGANIZED HEALTH CARE EDUCATION/TRAINING PROGRAM
Payer: MEDICARE
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
## 2024-03-11 DIAGNOSIS — E87.6 HYPOKALEMIA: ICD-10-CM

## 2024-03-11 DIAGNOSIS — S22.42XA CLOSED FRACTURE OF MULTIPLE RIBS OF LEFT SIDE, INITIAL ENCOUNTER: Primary | ICD-10-CM

## 2024-03-11 DIAGNOSIS — R53.1 GENERALIZED WEAKNESS: ICD-10-CM

## 2024-03-11 DIAGNOSIS — R25.1 TREMOR: ICD-10-CM

## 2024-03-11 DIAGNOSIS — S32.040A CLOSED COMPRESSION FRACTURE OF L4 VERTEBRA, INITIAL ENCOUNTER (H): ICD-10-CM
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
## 2024-03-11 DIAGNOSIS — B35.1 ONYCHOMYCOSIS: ICD-10-CM

## 2024-03-11 DIAGNOSIS — R26.81 GAIT INSTABILITY: ICD-10-CM

## 2024-03-11 DIAGNOSIS — T79.6XXA TRAUMATIC RHABDOMYOLYSIS, INITIAL ENCOUNTER (H): ICD-10-CM

## 2024-03-11 LAB
ALBUMIN SERPL BCG-MCNC: 4 G/DL (ref 3.5–5.2)
ALBUMIN UR-MCNC: 20 MG/DL
ALP SERPL-CCNC: 94 U/L (ref 40–150)
ALT SERPL W P-5'-P-CCNC: 21 U/L (ref 0–50)
ANION GAP SERPL CALCULATED.3IONS-SCNC: 12 MMOL/L (ref 7–15)
APPEARANCE UR: CLEAR
AST SERPL W P-5'-P-CCNC: 92 U/L (ref 0–45)
BASOPHILS # BLD AUTO: 0 10E3/UL (ref 0–0.2)
BASOPHILS NFR BLD AUTO: 0 %
BILIRUB DIRECT SERPL-MCNC: <0.2 MG/DL (ref 0–0.3)
BILIRUB SERPL-MCNC: 0.4 MG/DL
BILIRUB UR QL STRIP: NEGATIVE
BUN SERPL-MCNC: 16.9 MG/DL (ref 8–23)
CALCIUM SERPL-MCNC: 8.8 MG/DL (ref 8.8–10.2)
CHLORIDE SERPL-SCNC: 99 MMOL/L (ref 98–107)
CK SERPL-CCNC: 4331 U/L (ref 26–192)
COLOR UR AUTO: ABNORMAL
CREAT SERPL-MCNC: 0.87 MG/DL (ref 0.51–0.95)
DEPRECATED HCO3 PLAS-SCNC: 27 MMOL/L (ref 22–29)
EGFRCR SERPLBLD CKD-EPI 2021: 68 ML/MIN/1.73M2
EOSINOPHIL # BLD AUTO: 0 10E3/UL (ref 0–0.7)
EOSINOPHIL NFR BLD AUTO: 0 %
ERYTHROCYTE [DISTWIDTH] IN BLOOD BY AUTOMATED COUNT: 13.7 % (ref 10–15)
GLUCOSE SERPL-MCNC: 97 MG/DL (ref 70–99)
GLUCOSE UR STRIP-MCNC: NEGATIVE MG/DL
HCT VFR BLD AUTO: 37.4 % (ref 35–47)
HGB BLD-MCNC: 12.5 G/DL (ref 11.7–15.7)
HGB UR QL STRIP: ABNORMAL
HOLD SPECIMEN: 0
HOLD SPECIMEN: NORMAL
IMM GRANULOCYTES # BLD: 0 10E3/UL
IMM GRANULOCYTES NFR BLD: 0 %
KETONES UR STRIP-MCNC: ABNORMAL MG/DL
LEUKOCYTE ESTERASE UR QL STRIP: NEGATIVE
LYMPHOCYTES # BLD AUTO: 0.8 10E3/UL (ref 0.8–5.3)
LYMPHOCYTES NFR BLD AUTO: 8 %
MCH RBC QN AUTO: 28.4 PG (ref 26.5–33)
MCHC RBC AUTO-ENTMCNC: 33.4 G/DL (ref 31.5–36.5)
MCV RBC AUTO: 85 FL (ref 78–100)
MONOCYTES # BLD AUTO: 0.9 10E3/UL (ref 0–1.3)
MONOCYTES NFR BLD AUTO: 9 %
MUCOUS THREADS #/AREA URNS LPF: PRESENT /LPF
NEUTROPHILS # BLD AUTO: 8.6 10E3/UL (ref 1.6–8.3)
NEUTROPHILS NFR BLD AUTO: 83 %
NITRATE UR QL: NEGATIVE
NRBC # BLD AUTO: 0 10E3/UL
NRBC BLD AUTO-RTO: 0 /100
PH UR STRIP: 7 [PH] (ref 5–7)
PLATELET # BLD AUTO: 239 10E3/UL (ref 150–450)
POTASSIUM SERPL-SCNC: 3.4 MMOL/L (ref 3.4–5.3)
PROT SERPL-MCNC: 7.1 G/DL (ref 6.4–8.3)
RBC # BLD AUTO: 4.4 10E6/UL (ref 3.8–5.2)
RBC URINE: 29 /HPF
SODIUM SERPL-SCNC: 138 MMOL/L (ref 135–145)
SP GR UR STRIP: 1.01 (ref 1–1.03)
UROBILINOGEN UR STRIP-MCNC: NORMAL MG/DL
WBC # BLD AUTO: 10.5 10E3/UL (ref 4–11)
WBC URINE: 2 /HPF

## 2024-03-11 PROCEDURE — 81001 URINALYSIS AUTO W/SCOPE: CPT | Performed by: EMERGENCY MEDICINE

## 2024-03-11 PROCEDURE — 258N000003 HC RX IP 258 OP 636: Performed by: PHYSICIAN ASSISTANT

## 2024-03-11 PROCEDURE — 82550 ASSAY OF CK (CPK): CPT | Performed by: PHYSICIAN ASSISTANT

## 2024-03-11 PROCEDURE — 85025 COMPLETE CBC W/AUTO DIFF WBC: CPT | Performed by: EMERGENCY MEDICINE

## 2024-03-11 PROCEDURE — 80048 BASIC METABOLIC PNL TOTAL CA: CPT | Performed by: EMERGENCY MEDICINE

## 2024-03-11 PROCEDURE — 93005 ELECTROCARDIOGRAM TRACING: CPT

## 2024-03-11 PROCEDURE — 82248 BILIRUBIN DIRECT: CPT | Performed by: PHYSICIAN ASSISTANT

## 2024-03-11 PROCEDURE — 96360 HYDRATION IV INFUSION INIT: CPT

## 2024-03-11 PROCEDURE — 258N000003 HC RX IP 258 OP 636: Performed by: EMERGENCY MEDICINE

## 2024-03-11 PROCEDURE — 70450 CT HEAD/BRAIN W/O DYE: CPT | Mod: MG

## 2024-03-11 PROCEDURE — 99285 EMERGENCY DEPT VISIT HI MDM: CPT | Mod: 25

## 2024-03-11 PROCEDURE — 36415 COLL VENOUS BLD VENIPUNCTURE: CPT | Performed by: EMERGENCY MEDICINE

## 2024-03-11 PROCEDURE — G0378 HOSPITAL OBSERVATION PER HR: HCPCS

## 2024-03-11 PROCEDURE — 72192 CT PELVIS W/O DYE: CPT | Mod: MA

## 2024-03-11 PROCEDURE — 99223 1ST HOSP IP/OBS HIGH 75: CPT | Mod: AI | Performed by: PHYSICIAN ASSISTANT

## 2024-03-11 PROCEDURE — 96361 HYDRATE IV INFUSION ADD-ON: CPT

## 2024-03-11 RX ORDER — NALOXONE HYDROCHLORIDE 0.4 MG/ML
0.4 INJECTION, SOLUTION INTRAMUSCULAR; INTRAVENOUS; SUBCUTANEOUS
Status: DISCONTINUED | OUTPATIENT
Start: 2024-03-11 | End: 2024-03-15 | Stop reason: HOSPADM

## 2024-03-11 RX ORDER — LIDOCAINE 40 MG/G
CREAM TOPICAL
Status: DISCONTINUED | OUTPATIENT
Start: 2024-03-11 | End: 2024-03-15 | Stop reason: HOSPADM

## 2024-03-11 RX ORDER — NALOXONE HYDROCHLORIDE 0.4 MG/ML
0.2 INJECTION, SOLUTION INTRAMUSCULAR; INTRAVENOUS; SUBCUTANEOUS
Status: DISCONTINUED | OUTPATIENT
Start: 2024-03-11 | End: 2024-03-15 | Stop reason: HOSPADM

## 2024-03-11 RX ORDER — BISACODYL 10 MG
10 SUPPOSITORY, RECTAL RECTAL DAILY PRN
Status: DISCONTINUED | OUTPATIENT
Start: 2024-03-11 | End: 2024-03-15 | Stop reason: HOSPADM

## 2024-03-11 RX ORDER — AMOXICILLIN 250 MG
2 CAPSULE ORAL 2 TIMES DAILY PRN
Status: DISCONTINUED | OUTPATIENT
Start: 2024-03-11 | End: 2024-03-15 | Stop reason: HOSPADM

## 2024-03-11 RX ORDER — OXYCODONE HYDROCHLORIDE 5 MG/1
5 TABLET ORAL EVERY 4 HOURS PRN
Status: DISCONTINUED | OUTPATIENT
Start: 2024-03-11 | End: 2024-03-15 | Stop reason: HOSPADM

## 2024-03-11 RX ORDER — HYDRALAZINE HYDROCHLORIDE 10 MG/1
10 TABLET, FILM COATED ORAL EVERY 4 HOURS PRN
Status: DISCONTINUED | OUTPATIENT
Start: 2024-03-11 | End: 2024-03-15 | Stop reason: HOSPADM

## 2024-03-11 RX ORDER — ACETAMINOPHEN 325 MG/1
650 TABLET ORAL EVERY 4 HOURS PRN
Status: DISCONTINUED | OUTPATIENT
Start: 2024-03-11 | End: 2024-03-15 | Stop reason: HOSPADM

## 2024-03-11 RX ORDER — PROCHLORPERAZINE MALEATE 5 MG
5 TABLET ORAL EVERY 6 HOURS PRN
Status: DISCONTINUED | OUTPATIENT
Start: 2024-03-11 | End: 2024-03-12

## 2024-03-11 RX ORDER — POLYETHYLENE GLYCOL 3350 17 G/17G
17 POWDER, FOR SOLUTION ORAL 2 TIMES DAILY PRN
Status: DISCONTINUED | OUTPATIENT
Start: 2024-03-11 | End: 2024-03-15 | Stop reason: HOSPADM

## 2024-03-11 RX ORDER — ESCITALOPRAM OXALATE 10 MG/1
10 TABLET ORAL DAILY
Status: DISCONTINUED | OUTPATIENT
Start: 2024-03-12 | End: 2024-03-15 | Stop reason: HOSPADM

## 2024-03-11 RX ORDER — ESCITALOPRAM OXALATE 10 MG/1
10 TABLET ORAL DAILY
COMMUNITY

## 2024-03-11 RX ORDER — AMOXICILLIN 250 MG
1 CAPSULE ORAL 2 TIMES DAILY PRN
Status: DISCONTINUED | OUTPATIENT
Start: 2024-03-11 | End: 2024-03-15 | Stop reason: HOSPADM

## 2024-03-11 RX ORDER — AMOXICILLIN 250 MG
1 CAPSULE ORAL 2 TIMES DAILY PRN
COMMUNITY

## 2024-03-11 RX ORDER — HYDROMORPHONE HCL IN WATER/PF 6 MG/30 ML
0.2 PATIENT CONTROLLED ANALGESIA SYRINGE INTRAVENOUS
Status: DISCONTINUED | OUTPATIENT
Start: 2024-03-11 | End: 2024-03-15 | Stop reason: HOSPADM

## 2024-03-11 RX ORDER — ACETAMINOPHEN 650 MG/1
650 SUPPOSITORY RECTAL EVERY 4 HOURS PRN
Status: DISCONTINUED | OUTPATIENT
Start: 2024-03-11 | End: 2024-03-15 | Stop reason: HOSPADM

## 2024-03-11 RX ORDER — HYDROMORPHONE HCL IN WATER/PF 6 MG/30 ML
0.4 PATIENT CONTROLLED ANALGESIA SYRINGE INTRAVENOUS
Status: DISCONTINUED | OUTPATIENT
Start: 2024-03-11 | End: 2024-03-15 | Stop reason: HOSPADM

## 2024-03-11 RX ORDER — HYDRALAZINE HYDROCHLORIDE 20 MG/ML
10 INJECTION INTRAMUSCULAR; INTRAVENOUS EVERY 4 HOURS PRN
Status: DISCONTINUED | OUTPATIENT
Start: 2024-03-11 | End: 2024-03-15 | Stop reason: HOSPADM

## 2024-03-11 RX ORDER — SODIUM CHLORIDE 9 MG/ML
INJECTION, SOLUTION INTRAVENOUS CONTINUOUS
Status: DISCONTINUED | OUTPATIENT
Start: 2024-03-11 | End: 2024-03-14

## 2024-03-11 RX ORDER — ASPIRIN 325 MG
325 TABLET, DELAYED RELEASE (ENTERIC COATED) ORAL DAILY
Status: DISCONTINUED | OUTPATIENT
Start: 2024-03-12 | End: 2024-03-11

## 2024-03-11 RX ORDER — ONDANSETRON 2 MG/ML
4 INJECTION INTRAMUSCULAR; INTRAVENOUS EVERY 6 HOURS PRN
Status: DISCONTINUED | OUTPATIENT
Start: 2024-03-11 | End: 2024-03-15 | Stop reason: HOSPADM

## 2024-03-11 RX ORDER — OMEGA-3 FATTY ACIDS/FISH OIL 300-1000MG
200-400 CAPSULE ORAL EVERY 6 HOURS PRN
Status: ON HOLD | COMMUNITY
End: 2024-03-15

## 2024-03-11 RX ORDER — PROCHLORPERAZINE 25 MG
12.5 SUPPOSITORY, RECTAL RECTAL EVERY 12 HOURS PRN
Status: DISCONTINUED | OUTPATIENT
Start: 2024-03-11 | End: 2024-03-12

## 2024-03-11 RX ORDER — ONDANSETRON 4 MG/1
4 TABLET, ORALLY DISINTEGRATING ORAL EVERY 6 HOURS PRN
Status: DISCONTINUED | OUTPATIENT
Start: 2024-03-11 | End: 2024-03-15 | Stop reason: HOSPADM

## 2024-03-11 RX ADMIN — SODIUM CHLORIDE 1000 ML: 9 INJECTION, SOLUTION INTRAVENOUS at 10:37

## 2024-03-11 RX ADMIN — SODIUM CHLORIDE: 9 INJECTION, SOLUTION INTRAVENOUS at 19:36

## 2024-03-11 ASSESSMENT — ACTIVITIES OF DAILY LIVING (ADL)
ADLS_ACUITY_SCORE: 38
ADLS_ACUITY_SCORE: 38
ADLS_ACUITY_SCORE: 31
ADLS_ACUITY_SCORE: 38
ADLS_ACUITY_SCORE: 30
ADLS_ACUITY_SCORE: 38

## 2024-03-11 ASSESSMENT — COLUMBIA-SUICIDE SEVERITY RATING SCALE - C-SSRS
2. HAVE YOU ACTUALLY HAD ANY THOUGHTS OF KILLING YOURSELF IN THE PAST MONTH?: NO
1. IN THE PAST MONTH, HAVE YOU WISHED YOU WERE DEAD OR WISHED YOU COULD GO TO SLEEP AND NOT WAKE UP?: NO
6. HAVE YOU EVER DONE ANYTHING, STARTED TO DO ANYTHING, OR PREPARED TO DO ANYTHING TO END YOUR LIFE?: NO

## 2024-03-11 NOTE — ED NOTES
[facility]  ED Nurse Handoff Report    ED Chief complaint: Fall      ED Diagnosis:   Final diagnoses:   Generalized weakness   Fall at home, initial encounter   Closed compression fracture of L4 vertebra, initial encounter (H)   Tremor   Gait instability   Traumatic rhabdomyolysis, initial encounter (H24)       Code Status: Full Code    Allergies:   Allergies   Allergen Reactions    Strawberry Extract     Pcn [Penicillins] Rash       Patient Story: presents after she fell 4.5 hours ago. She notes she fell because she was not using her walker. She and her  just got home from [place], [place]. She denies pain or injuries. She denies hitting her head or headache. She denies neck pain. Family and neighbors were unable to get the patient up. Her  adds she complained of tailbone pain when trying to get up which she notes is not present in the ED. She denies hip or back pain. She adds she has been eating and drinking okay. She has had hand tremors for several months. No fever, cough, or trouble urinating. She notes she uses a walker at her baseline.   Focused Assessment:  weakness    Treatments and/or interventions provided:   Labs Ordered and Resulted from Time of ED Arrival to Time of ED Departure   ROUTINE UA WITH MICROSCOPIC REFLEX TO CULTURE - Abnormal       Result Value    Color Urine Light Yellow      Appearance Urine Clear      Glucose Urine Negative      Bilirubin Urine Negative      Ketones Urine Trace (*)     Specific Gravity Urine 1.013      Blood Urine Moderate (*)     pH Urine 7.0      Protein Albumin Urine 20 (*)     Urobilinogen Urine Normal      Nitrite Urine Negative      Leukocyte Esterase Urine Negative      Mucus Urine Present (*)     RBC Urine 29 (*)     WBC Urine 2     CBC WITH PLATELETS AND DIFFERENTIAL - Abnormal    WBC Count 10.5      RBC Count 4.40      Hemoglobin 12.5      Hematocrit 37.4      MCV 85      MCH 28.4      MCHC 33.4      RDW 13.7      Platelet Count 239       % Neutrophils 83      % Lymphocytes 8      % Monocytes 9      % Eosinophils 0      % Basophils 0      % Immature Granulocytes 0      NRBCs per 100 WBC 0      Absolute Neutrophils 8.6 (*)     Absolute Lymphocytes 0.8      Absolute Monocytes 0.9      Absolute Eosinophils 0.0      Absolute Basophils 0.0      Absolute Immature Granulocytes 0.0      Absolute NRBCs 0.0     BASIC METABOLIC PANEL - Normal    Sodium 138      Potassium 3.4      Chloride 99      Carbon Dioxide (CO2) 27      Anion Gap 12      Urea Nitrogen 16.9      Creatinine 0.87      GFR Estimate 68      Calcium 8.8      Glucose 97        Medications   sodium chloride 0.9% BOLUS 1,000 mL (1,000 mLs Intravenous $New Bag 3/11/24 1037)      CT Pelvis Bone wo Contrast   Final Result   IMPRESSION:   1.  No acute pelvic fracture is identified. Given the degree of   osseous demineralization, MRI would be more sensitive for nondisplaced   fractures and should be considered if there is persistent clinical   concern.   2.  Age-indeterminate compression fracture of the L4 vertebral body   with mild loss of vertebral body height. Lumbar spine MR could better   evaluate as clinically indicated.   3.  Status post right hip arthroplasty, the femoral stem component of   which extends beyond the field of view distally. No periprosthetic   fracture is identified within the field of view. Consider dedicated   radiographs of the right femur if there is concern for a fracture at   the distal portion of the arthroplasty.      KAN VALVERDE MD            SYSTEM ID:  [id number]          Patient's response to treatments and/or interventions: tolearted     To be done/followed up on inpatient unit:  na    Does this patient have any cognitive concerns?:  na    Activity level - Baseline/Home:  Unknown  Activity Level - Current:   Unknown    Patient's Preferred language: English   Needed?: No    Isolation: None  Infection: Not Applicable  Patient tested for COVID 19 prior to  admission: YES  Bariatric?: No    Vital Signs:   Vitals:    03/11/24 0840 03/11/24 0900 03/11/24 1030 03/11/24 1100   BP: (!) 147/84      Pulse: 84 77 83 83   Resp: 18 20 15 15   Temp: 98.8  F (37.1  C)      TempSrc: Oral      SpO2: 97% 96% 97% (!) 64%   Weight: 45.4 kg (100 lb)      Height: 1.524 m (5')          Cardiac Rhythm:     Was the PSS-3 completed:   Yes  What interventions are required if any?               Family Comments:   OBS brochure/video discussed/provided to patient/family: Yes              Name of person given brochure if not patient:               Relationship to patient:     For the majority of the shift this patient's behavior was Green.   Behavioral interventions performed were .    ED NURSE PHONE NUMBER: [phone]

## 2024-03-11 NOTE — ED NOTES
Bed: ED03  Expected date:   Expected time:   Means of arrival:   Comments:  E1  78 F fall/no obvious injuries  0835

## 2024-03-11 NOTE — ED NOTES
Pthad stated she had to go to the bathroom.  I went In to help pt get to a commode with a walker - pt unable to get herself to the side of the bed let alone walk.  When pt ambulated she shuffled her feet but barely moved forwards . Pt had an incontinent BM that was dried on to her legs and pants .  Pt very weak and it appeared she has not been showering - there was a lot of dry flakey dead skin all over - especially pt's feet .  Pt stated she has not showered for 3 weeks. I asked pt about what she does at home for help - she stated that her and her  help each other, I asked pt if her  is aware that she has not showered for so long... she was not clear on this .

## 2024-03-11 NOTE — ED PROVIDER NOTES
History     Chief Complaint:  Fall     The history is provided by the patient and the spouse.      Dorothea Dugan is a 78 year old female with a history of hyperparathyroidism and anxiety who presents after she fell 4.5 hours ago. She notes she fell because she was not using her walker. She and her  just got home from 3 weeks and [place], [place] last night. She denies pain or injuries. She denies hitting her head or headache. She denies neck pain. Family and neighbors were unable to get the patient up. Her  adds she complained of tailbone pain when trying to get up which she notes is not present in the ED. She denies hip or back pain. She adds she has been eating and drinking okay. She has had bilateral hand tremors and trouble walking with short slow shuffling gait for approximately 6 months.  Her doctor referred her to physical therapy which has been helping with her walking, however when the patient and her  were in [facility] for 3 weeks she was not getting any physical therapy and her  thinks this made her weaker.  No fever, cough, abdominal pain, vomiting, diarrhea or trouble urinating. She notes she uses a walker at her baseline.     Independent Historian:   Spouse/Partner - They report as noted above.    Review of External Notes:   I reviewed her most recent primary care clinic note.    Medications:    Aspirin 325 MG  Escitalopram     Past Medical History:    Anxiety  Chronic right shoulder pain   Chronic pain of right ankle   Fracture of head of femur  Heart murmur  Hyperparathyroidism   Insomnia  Major depressive disorder  Osteoporosis  Parathyroid adenoma   Menopausal and postmenopausal disorder  Heart murmur, systolic     Past Surgical History:    Arthroplasty hip, right  Closed rx prox humerus fracture, right  ENT surgery    ORIF ankle, right  Parathyroidectomy      Physical Exam   Patient Vitals for the past 24 hrs:   BP Temp Temp src Pulse Resp SpO2 Height Weight    03/11/24 0840 (!) 147/84 98.8  F (37.1  C) Oral 84 18 97 % 1.524 m (5') 45.4 kg (100 lb)   03/11/24 0836 (!) 147/84 -- -- 82 16 96 % -- --        Physical Exam  Nursing note and vitals reviewed.  Constitutional:  Appears well-developed and well-nourished.   HENT:   Head:    Atraumatic.   Mouth/Throat:   Oropharynx is clear and moist. No oropharyngeal exudate.   Eyes:    Pupils are equal, round, and reactive to light.   Neck:    Nontender neck. normal range of motion. Neck supple.      No tracheal deviation present. No thyromegaly present.   Cardiovascular:  Normal rate, regular rhythm, no murmur   Pulmonary/Chest: Breath sounds are clear and equal without wheezes or crackles.  Abdominal:   Soft. Bowel sounds are normal. Exhibits no distension and      no mass. There is no tenderness.      There is no rebound and no guarding.   Musculoskeletal:  Exhibits no edema. Arms, legs, and back are nontender.   Lymphadenopathy:  No cervical adenopathy.   Neurological:   Alert and oriented to person, place, and time.  She has a tremor of both her hands.  GCS 15.  CN 2-12 intact.  and proximal upper extremity strength strong and equal.  Bilateral lower extremity strength strong and equal, including strong dorsiflexion and plantarflexion strength.  Sensation intact and equal to the face, arms and legs.  No facial droop or weakness. Normal speech.  Follows commands and answers questions normally.     Skin:    Skin is warm and dry. No rash noted. No pallor.     Emergency Department Course   ECG  ECG taken at 14:09 on 3/11/24, ECG read at 14:21 on 3/11/24  Normal Sinus Rhythm  Ventricular Rate 88 bpm. MA interval 122 ms. QRS duration 74 ms. QT/QTc 390/471 ms. P-R-T axes 84 68 82.     Imaging:  CT Pelvis Bone wo Contrast   Final Result   IMPRESSION:   1.  No acute pelvic fracture is identified. Given the degree of   osseous demineralization, MRI would be more sensitive for nondisplaced   fractures and should be considered if  there is persistent clinical   concern.   2.  Age-indeterminate compression fracture of the L4 vertebral body   with mild loss of vertebral body height. Lumbar spine MR could better   evaluate as clinically indicated.   3.  Status post right hip arthroplasty, the femoral stem component of   which extends beyond the field of view distally. No periprosthetic   fracture is identified within the field of view. Consider dedicated   radiographs of the right femur if there is concern for a fracture at   the distal portion of the arthroplasty.      KAN VALVERDE MD            SYSTEM ID:  [id number]         Laboratory:  Labs Ordered and Resulted from Time of ED Arrival to Time of ED Departure   ROUTINE UA WITH MICROSCOPIC REFLEX TO CULTURE - Abnormal       Result Value    Color Urine Light Yellow      Appearance Urine Clear      Glucose Urine Negative      Bilirubin Urine Negative      Ketones Urine Trace (*)     Specific Gravity Urine 1.013      Blood Urine Moderate (*)     pH Urine 7.0      Protein Albumin Urine 20 (*)     Urobilinogen Urine Normal      Nitrite Urine Negative      Leukocyte Esterase Urine Negative      Mucus Urine Present (*)     RBC Urine 29 (*)     WBC Urine 2     CBC WITH PLATELETS AND DIFFERENTIAL - Abnormal    WBC Count 10.5      RBC Count 4.40      Hemoglobin 12.5      Hematocrit 37.4      MCV 85      MCH 28.4      MCHC 33.4      RDW 13.7      Platelet Count 239      % Neutrophils 83      % Lymphocytes 8      % Monocytes 9      % Eosinophils 0      % Basophils 0      % Immature Granulocytes 0      NRBCs per 100 WBC 0      Absolute Neutrophils 8.6 (*)     Absolute Lymphocytes 0.8      Absolute Monocytes 0.9      Absolute Eosinophils 0.0      Absolute Basophils 0.0      Absolute Immature Granulocytes 0.0      Absolute NRBCs 0.0     BASIC METABOLIC PANEL - Normal    Sodium 138      Potassium 3.4      Chloride 99      Carbon Dioxide (CO2) 27      Anion Gap 12      Urea Nitrogen 16.9      Creatinine 0.87       GFR Estimate 68      Calcium 8.8      Glucose 97     CK TOTAL        Procedures   None    Emergency Department Course & Assessments:    Interventions:  Medications   sodium chloride 0.9% BOLUS 1,000 mL (1,000 mLs Intravenous $New Bag 3/11/24 1037)        Independent Interpretation (X-rays, CTs, rhythm strip):  None    Assessments/Consultations/Discussion of Management or Tests:   ED Course as of 03/11/24 0852   Mon Mar 11, 2024   0842 I obtained the patient's history and examined as noted above.    I spoke with Clara ENGLISH with the hospitalist service.  I spoke with Margaret  on-call.    Social Determinants of Health affecting care:   Healthcare Access/Compliance and Transportation    Disposition:  Care of the patient was transferred to my colleague Dr. Ibanez pending social work consultation and appropriate disposition.     Impression & Plan      Medical Decision Making:  This patient arrived to the emergency department for evaluation of a fall who was found to be generally weak with inability to ambulate due to the generalized weakness.  The patient fell onto her tailbone and it was having pain in her low back and tailbone.  I performed a CT of her pelvis which showed an age-indeterminate L4 compression fracture, however no sign of pelvic or hip fracture.  Patient was neurologically intact here.  There were no other apparent injuries.  There was no sign of UTI or other infection.  No sign of bleeding.  I am concerned that the patient is developing Parkinson's disease since she has had bilateral hand tremors and trouble walking for the past 6 months.  We tried to ambulate her with her walker here but she was unable to ambulate due to generalized weakness.  At this point I recommended admission to the hospital for observation and I spoke to Clara Salas of the hospitalist service regarding admission since I recommend neurology consultation to evaluate the patient's tremors and weakness  as well as physical therapy.  However hospitalist PA evaluated the patient in the emergency department spoke with her  and does not feel that the patient should be admitted to the hospital and instead recommends correction care.  At this point social work was consulted and spoke with Margaret who is a  on today.  Patient's care was signed out to Dr. Ibanez pending appropriate disposition.    Diagnosis:    ICD-10-CM    1. Generalized weakness  R53.1       2. Fall at home, initial encounter  W19.XXXA     Y92.009       3. Closed compression fracture of L4 vertebra, initial encounter (H)  S32.040A       4. Tremor  R25.1       5. Gait instability  R26.81            Discharge Medications:  New Prescriptions    No medications on file      Scribe Disclosure:  I, Rachna Bingham, am serving as a scribe at 8:35 AM on 3/11/2024 to document services personally performed by Lucinda Guerrero MD based on my observations and the provider's statements to me.      3/11/2024   Lucinda Guerrero MD [place], Cheri Lee, MD  03/11/24 1928

## 2024-03-11 NOTE — ED PROVIDER NOTES
6:40 PM -patient signed out to me by Dr. Guerrero.  After signout total CK level came back significantly elevated at 4331 consistent with moderate traumatic rhabdomyolysis.  Renal function is normal.  Additional normal saline ordered.  Given ongoing pain due to injury, traumatic rhabdomyolysis patient does meet criteria now for medical admission to observation.  The case was discussed again with the hospitalist team and the patient was accepted for admission to observation.    Clinical impression:    ICD-10-CM    1. Generalized weakness  R53.1       2. Fall at home, initial encounter  W19.XXXA     Y92.009       3. Closed compression fracture of L4 vertebra, initial encounter (H)  S32.040A       4. Tremor  R25.1       5. Gait instability  R26.81       6. Traumatic rhabdomyolysis, initial encounter (H24)  T79.6XXA         Disposition:  Admitted to the [facility]     Ton Ibanez MD  03/11/24 1964

## 2024-03-11 NOTE — CONSULTS
Care Management Initial Consult    General Information  Assessment completed with: Patient, Spouse or significant other,    Type of CM/SW Visit: Initial Assessment    Primary Care Provider verified and updated as needed:     Readmission within the last 30 days:        Reason for Consult: discharge planning  Advance Care Planning:            Communication Assessment  Patient's communication style: spoken language (English or Bilingual)             Cognitive  Cognitive/Neuro/Behavioral: WDL     Arousal Level: opens eyes spontaneously                Living Environment:   People in home: spouse     Current living Arrangements: house      Able to return to prior arrangements:         Family/Social Support:  Care provided by: spouse/significant other  Provides care for: no one  Marital Status:             Description of Support System: Supportive, Involved    Support Assessment: Adequate family and caregiver support, Adequate social supports    Current Resources:   Patient receiving home care services:       Community Resources:    Equipment currently used at home:    Supplies currently used at home:      Employment/Financial:  Employment Status:          Financial Concerns:             Does the patient's insurance plan have a 3 day qualifying hospital stay waiver?  No    Lifestyle & Psychosocial Needs:  Social Determinants of Health     Food Insecurity: Not on file   Depression: Not on file   Housing Stability: Not on file   Tobacco Use: Medium Risk (9/2/2021)    Patient History     Smoking Tobacco Use: Former     Smokeless Tobacco Use: Never     Passive Exposure: Not on file   Financial Resource Strain: Not on file   Alcohol Use: Not on file   Transportation Needs: Not on file   Physical Activity: Not on file   Interpersonal Safety: Not on file   Stress: Not on file   Social Connections: Not on file       Functional Status:  Prior to admission patient needed assistance:              Mental Health Status:           Chemical Dependency Status:                Values/Beliefs:  Spiritual, Cultural Beliefs, Jew Practices, Values that affect care:                 Additional Information:  SW consulted to assess for skilled nursing care. Patient states she had a fall and is now unable to walk. Previously patient was walking with a four wheeled walker and support from her  at home. She has grab bars in the garage and the bathroom as well as a shower chair. SW spoke with patient's  Ed who stated pt's inability to walk is new for patient. They just came home yesterday from a trip to [place]. Ed would like to see patient go to TCU and his preferences would be [facility] or [facility]. He would also be open to bringing patient home with home care and some private services, but think she would get more care at a TCU and then would plan to bring her home. SW verified insurance and it does not appear patient would have coverage for TCU. SW to let  know this for his planning purposes. At this time patient would benefit from a therapy eval in the ED to assist with continued discharge planning.     [facility] will continue to follow.     Margaret Sanford, PORSCHE Sanford, PORSCHE

## 2024-03-11 NOTE — ED TRIAGE NOTES
Pt and her  just came in last nivia from [place] .  Pt had a fall around 0400 this AM - she had been using her walker but then on the way back supposedly she did not use it and then fell to the floor .   Denies any neck back or head pain . Pt was on the floor from 0400 until she got a hold of her  by waking him up .      Triage Assessment (Adult)       Row Name 03/11/24 0838          Triage Assessment    Airway WDL WDL        Respiratory WDL    Respiratory WDL WDL        Skin Circulation/Temperature WDL    Skin Circulation/Temperature WDL WDL        Cardiac WDL    Cardiac WDL WDL        Peripheral/Neurovascular WDL    Peripheral Neurovascular WDL WDL        Cognitive/Neuro/Behavioral WDL    Cognitive/Neuro/Behavioral WDL WDL

## 2024-03-11 NOTE — H&P
[place] grid at home. MVI/AREDS discussed. Patient to call if any changes in vision or grid card. [facility]    History and Physical - Hospitalist Service       Date of Admission:  3/11/2024    Assessment & Plan   Dorothea Dugan is a 78 year old female medical history significant for depression, anxiety, hyperparathyroidism, history of hip fracture status post LAURA, and gait instability who was registered to observation on 3/11/2024 after sustaining a mechanical fall and found to be generally weak with an elevated CK after laying on the ground for hours, consistent with rhabdomyolysis.    Rhabdomyolysis  Generalized weakness  Mechanical fall  Presented with weakness and a mechanical fall at home in the bathroom as her walker could not fit in the bathroom and she says she tripped over a rug and laid on the ground for several hours before her  found her.  Initially was to admit as long term care however a CK level was added on as she had laid on the ground for hours and resulted at 4331 observation admission for IV fluid and monitoring of CK level.  UA noninfectious appearing, notes moderate blood, 29 RBC, in setting of elevated CK.  CT pelvis and bone without contrast showed no acute fracture, age-indeterminate compression fracture of L4 vertebral body with mild loss of height, status post right hip arthroplasty.  Patient unable to ambulate in the ED after multiple to pull attempts, she has no spinal tenderness and no complaints of pelvic, hip, or other pain upon attempting to get up just felt weak, which is consistent with rhabdo.  -Observation status  -Follow-up head CT  -IVF  -Add on liver tests  -Monitor renal function CK level  -Supportive care  -PT/OT/SW/CC consultations for disposition -likely TCU on discharge    Tremor  Patient reports tremor and shuffling gait for many months now.  They have not seen neurology.  She has been deconditioned and previously working with physical therapy up until November when they went to [place] for the winter.  Some question as to whether  she could have an undiagnosed Parkinson's.  It would be difficult to assess this in the setting of elevated CK and weakness.  -Consider neurology referral on discharge    Chronic stable diagnoses and other pertinent medical history: Appropriate PTA medications will be resumed. Nonessential medications will be held if patient is observation status.   Hyperparathyroidism  History of right femoral neck hip fracture status post LAURA 2020  Anxiety  Depression        Diet: Regular Diet Adult    DVT Prophylaxis: Pneumatic Compression Devices and Ambulate every shift  Gagnon Catheter: Not present  Lines: None     Cardiac Monitoring: None  Code Status:  DNR/DNI, confirmed with patient and spouse on admission    Clinically Significant Risk Factors Present on Admission                                  Disposition Plan      Expected Discharge Date: 03/12/2024,  3:00 PM  Discharge Delays: Lab Result Pending (enter specific test & time in comments)  MD D/C Med Rec  MD D/C Order   OT Disposition recs needed  Placement - TCU  PT Disposition recs needed  PT New Consult needed  OT New Consult needed  Destination: home with help/services;inpatient rehabilitation facility  Discharge Comments: Repeat/trend CK  PT/OT evaluations  SW/CC for disposition, likely TCU        The patient's care was discussed with the Attending Physician, Dr. Darden, Bedside Nurse, Care Coordinator/, Patient, Patient's Family, and ED Physician .    Clara Fernández PA-C  Hospitalist Service  [facility]  Securely message with [employer] (more info)  Text page via [employer] Paging/Directory     ______________________________________________________________________    Chief Complaint   Fall, weakness    History is obtained from the patient, electronic health record, emergency department physician, and patient's spouse    History of Present Illness   Dorothea Dugan is a 78 year old female medical history significant for  depression, anxiety, hyperparathyroidism, history of hip fracture status post LAURA, and gait instability who presented to the hospital after mechanical fall and generalized weakness.  The patient was receiving physical therapy up until November when she moved to [place] for the winter.  She returned back last night and took a commercial flight home.  She states her walker does not fit in the bathroom so she ambulates to the bathroom with her walker and then leaves it at the door goes in the bathroom without the walker.  She attempted to go to the bathroom when she dropped her walker off at the door and then tripped on a rug in the bathroom.  She laid on the floor for several hours before her  found her.  He was unable to get her up therefore EMS was called who brought her to the emergency department.  Of note she has had some tremor and some shuffling gait for several months now, no family history of Parkinson's.  She has not seen primary care recently.    In the emergency department she is hemodynamically stable and initially all of her labs were unremarkable.  She had a CT scan that did not reveal any new injuries but did show age-indeterminate compression fracture of L4 vertebral body with loss of height, no acute pelvic fracture but notes osseous demineralization, and status post right hip arthroplasty.  The ED attempted to ambulate the patient multiple times however she was unable to get out of bed due to generalized weakness.  She had no spinal tenderness, hip pain, or pelvic pain.  Full 5-5 dorsi, plantar, and hip flexion.  Full  strength.  Initially we are seeking snf care however I added on a CK level which resulted at over 4000 therefore she was registered to observation for rhabdomyolysis, PT/OT evaluations, and placement.    Past Medical History    Past Medical History:   Diagnosis Date    Anxiety     Anxiety state, unspecified     Breast screening, unspecified     Fracture of head of  femur (H)     Heart murmur     Insomnia, unspecified     Major depressive disorder, single episode, unspecified     Osteoporosis, unspecified     Parathyroid adenoma     Unspecified menopausal and postmenopausal disorder        Past Surgical History   Past Surgical History:   Procedure Laterality Date    ARTHROPLASTY HIP Right 2020    Procedure: RIGHT TOTAL HIP ARTHROPLASTY.;  Surgeon: Luis Felipe Christopher MD;  Location:  OR    CLOSED RX PROX HUMERUS FRACTURE Right     ENT SURGERY      oral surgery 2014    GYN SURGERY          OPEN REDUCTION INTERNAL FIXATION ANKLE Right     PARATHYROIDECTOMY Right 2021    Procedure: RIGHT NECK EXPLORATION, EXCISION PARATHYROID ADENOMA;  Surgeon: Mario Alberto Enriquez MD;  Location:  OR       Prior to Admission Medications   Prior to Admission Medications   Prescriptions Last Dose Informant Patient Reported? Taking?   Multiple Vitamin (MULTI VITAMIN DAILY PO) 3/11/2024 at unknown time Self Yes Yes   Sig: Take 1 tablet by mouth daily    Vitamin D, Cholecalciferol, 25 MCG (1000 UT) TABS 3/11/2024 at unknown time Self Yes Yes   Sig: Take 25 mcg by mouth daily   biotin 1000 MCG TABS tablet Unknown at does not take regularly Self Yes Yes   Sig: Take 500 mcg by mouth daily   escitalopram (LEXAPRO) 10 MG tablet 3/11/2024 at AM Self Yes Yes   Sig: Take 10 mg by mouth daily   ibuprofen (ADVIL/MOTRIN) 200 MG capsule Unknown at PRN Self Yes Yes   Sig: Take 200-400 mg by mouth every 6 hours as needed for fever or inflammatory pain   senna-docusate (SENOKOT-S/PERICOLACE) 8.6-50 MG tablet Unknown at PRN Self Yes Yes   Sig: Take 1 tablet by mouth 2 times daily as needed for constipation      Facility-Administered Medications: None        Review of Systems    The 10 point Review of Systems is negative other than noted in the HPI or here.     Allergies   Allergies   Allergen Reactions    Strawberry Extract     Pcn [Penicillins] Rash        Physical Exam   Vital Signs:  Temp: 98.8  F (37.1  C) Temp src: Oral BP: 136/70 Pulse: 85   Resp: 20 SpO2: 96 % O2 Device: None (Room air)    Weight: 100 lbs 0 oz    Physical Exam    General: Awake, alert, very pleasant woman who appears older than stated age, unkempt in appearance. Looks comfortable sitting up in bed. No acute distress.  HEENT: Normocephalic, atraumatic. Extraocular movements intact.   Respiratory: Clear to auscultation bilaterally, no rales, wheezing, or rhonchi.  Cardiovascular: Regular rate and rhythm, +S1 and S2, no murmur auscultated. No peripheral edema.   Gastrointestinal: Soft, non-tender, non-distended. Bowel sounds present.  Skin: Warm, dry. No obvious rashes or lesions on exposed skin. Dorsalis pedis pulses palpable bilaterally.  Musculoskeletal: No joint swelling, erythema or tenderness. Moves all extremities equally. 5/5 dorsi/plantar, hip flexion.   Neurologic: AAO x3.   Psychiatric: Appropriate mood and affect.       Medical Decision Making       75 MINUTES SPENT BY ME on the date of service doing chart review, history, exam, documentation & further activities per the note.      Data     I have personally reviewed the following data over the past 24 hrs:    10.5  \   12.5   / 239     138 99 16.9 /  97   3.4 27 0.87 \     ALT: 21 AST: 92 (H) AP: 94 TBILI: 0.4   ALB: 4.0 TOT PROTEIN: 7.1 LIPASE: N/A       Imaging results reviewed over the past 24 hrs:   Recent Results (from the past 24 hour(s))   CT Pelvis Bone wo Contrast    Narrative    CT PELVIS BONE WITHOUT CONTRAST  3/11/2024 9:23 AM    INDICATION: Pain after fall.    COMPARISON: Pelvic radiographs dated 7/14/2020.    TECHNIQUE: Noncontrast. Axial, sagittal and coronal thin-section  reconstruction. Dose reduction techniques were used.     FINDINGS:   BONES:  -There is diffuse osseous demineralization. No acute fracture is  identified. There are degenerative changes in the lower lumbar spine  and at the sacroiliac joints.    A remote, healed left inferior pubic  ramus fracture is noted.    There is compression fracture deformity of the L4 vertebral body with  mild loss of vertebral body height. This is age-indeterminate.    There are postoperative changes from right hip arthroplasty, the  femoral stem component of which extends beyond the field of view  distally.    SOFT TISSUES:  -Vascular atherosclerotic calcifications are noted. No free fluid is  identified within the pelvis. The bladder is distended. No drainable  fluid collection.      Impression    IMPRESSION:  1.  No acute pelvic fracture is identified. Given the degree of  osseous demineralization, MRI would be more sensitive for nondisplaced  fractures and should be considered if there is persistent clinical  concern.  2.  Age-indeterminate compression fracture of the L4 vertebral body  with mild loss of vertebral body height. Lumbar spine MR could better  evaluate as clinically indicated.  3.  Status post right hip arthroplasty, the femoral stem component of  which extends beyond the field of view distally. No periprosthetic  fracture is identified within the field of view. Consider dedicated  radiographs of the right femur if there is concern for a fracture at  the distal portion of the arthroplasty.    KAN VALVERDE MD         SYSTEM ID:  [id number]

## 2024-03-12 ENCOUNTER — APPOINTMENT (OUTPATIENT)
Dept: PHYSICAL THERAPY | Facility: CLINIC | Age: 79
DRG: 565 | End: 2024-03-12
Attending: PHYSICIAN ASSISTANT
Payer: MEDICARE

## 2024-03-12 LAB
ANION GAP SERPL CALCULATED.3IONS-SCNC: 10 MMOL/L (ref 7–15)
ATRIAL RATE - MUSE: 88 BPM
BUN SERPL-MCNC: 10.1 MG/DL (ref 8–23)
CALCIUM SERPL-MCNC: 7.9 MG/DL (ref 8.8–10.2)
CHLORIDE SERPL-SCNC: 104 MMOL/L (ref 98–107)
CK SERPL-CCNC: 4812 U/L (ref 26–192)
CREAT SERPL-MCNC: 0.7 MG/DL (ref 0.51–0.95)
DEPRECATED HCO3 PLAS-SCNC: 24 MMOL/L (ref 22–29)
DIASTOLIC BLOOD PRESSURE - MUSE: NORMAL MMHG
EGFRCR SERPLBLD CKD-EPI 2021: 89 ML/MIN/1.73M2
ERYTHROCYTE [DISTWIDTH] IN BLOOD BY AUTOMATED COUNT: 13.8 % (ref 10–15)
GLUCOSE SERPL-MCNC: 92 MG/DL (ref 70–99)
HCT VFR BLD AUTO: 34.8 % (ref 35–47)
HGB BLD-MCNC: 11.6 G/DL (ref 11.7–15.7)
INTERPRETATION ECG - MUSE: NORMAL
MAGNESIUM SERPL-MCNC: 1.7 MG/DL (ref 1.7–2.3)
MCH RBC QN AUTO: 28.3 PG (ref 26.5–33)
MCHC RBC AUTO-ENTMCNC: 33.3 G/DL (ref 31.5–36.5)
MCV RBC AUTO: 85 FL (ref 78–100)
P AXIS - MUSE: 84 DEGREES
PLATELET # BLD AUTO: 208 10E3/UL (ref 150–450)
POTASSIUM SERPL-SCNC: 2.9 MMOL/L (ref 3.4–5.3)
POTASSIUM SERPL-SCNC: 3.4 MMOL/L (ref 3.4–5.3)
POTASSIUM SERPL-SCNC: 3.5 MMOL/L (ref 3.4–5.3)
PR INTERVAL - MUSE: 122 MS
QRS DURATION - MUSE: 74 MS
QT - MUSE: 390 MS
QTC - MUSE: 471 MS
R AXIS - MUSE: 68 DEGREES
RBC # BLD AUTO: 4.1 10E6/UL (ref 3.8–5.2)
SODIUM SERPL-SCNC: 138 MMOL/L (ref 135–145)
SYSTOLIC BLOOD PRESSURE - MUSE: NORMAL MMHG
T AXIS - MUSE: 82 DEGREES
VENTRICULAR RATE- MUSE: 88 BPM
WBC # BLD AUTO: 6.3 10E3/UL (ref 4–11)

## 2024-03-12 PROCEDURE — 250N000013 HC RX MED GY IP 250 OP 250 PS 637: Performed by: PHYSICIAN ASSISTANT

## 2024-03-12 PROCEDURE — 84132 ASSAY OF SERUM POTASSIUM: CPT | Performed by: HOSPITALIST

## 2024-03-12 PROCEDURE — 36415 COLL VENOUS BLD VENIPUNCTURE: CPT | Performed by: PHYSICIAN ASSISTANT

## 2024-03-12 PROCEDURE — 82550 ASSAY OF CK (CPK): CPT | Performed by: PHYSICIAN ASSISTANT

## 2024-03-12 PROCEDURE — G0378 HOSPITAL OBSERVATION PER HR: HCPCS

## 2024-03-12 PROCEDURE — 258N000003 HC RX IP 258 OP 636: Performed by: PHYSICIAN ASSISTANT

## 2024-03-12 PROCEDURE — 97530 THERAPEUTIC ACTIVITIES: CPT | Mod: GP | Performed by: PHYSICAL THERAPIST

## 2024-03-12 PROCEDURE — 80048 BASIC METABOLIC PNL TOTAL CA: CPT | Performed by: PHYSICIAN ASSISTANT

## 2024-03-12 PROCEDURE — 97116 GAIT TRAINING THERAPY: CPT | Mod: GP | Performed by: PHYSICAL THERAPIST

## 2024-03-12 PROCEDURE — 99233 SBSQ HOSP IP/OBS HIGH 50: CPT | Performed by: PHYSICIAN ASSISTANT

## 2024-03-12 PROCEDURE — 83735 ASSAY OF MAGNESIUM: CPT | Performed by: PHYSICIAN ASSISTANT

## 2024-03-12 PROCEDURE — 120N000001 HC R&B MED SURG/OB

## 2024-03-12 PROCEDURE — 36415 COLL VENOUS BLD VENIPUNCTURE: CPT | Performed by: HOSPITALIST

## 2024-03-12 PROCEDURE — 97162 PT EVAL MOD COMPLEX 30 MIN: CPT | Mod: GP | Performed by: PHYSICAL THERAPIST

## 2024-03-12 PROCEDURE — 85027 COMPLETE CBC AUTOMATED: CPT | Performed by: PHYSICIAN ASSISTANT

## 2024-03-12 PROCEDURE — 99232 SBSQ HOSP IP/OBS MODERATE 35: CPT | Performed by: HOSPITALIST

## 2024-03-12 PROCEDURE — 84132 ASSAY OF SERUM POTASSIUM: CPT | Performed by: PHYSICIAN ASSISTANT

## 2024-03-12 PROCEDURE — 250N000013 HC RX MED GY IP 250 OP 250 PS 637: Performed by: HOSPITALIST

## 2024-03-12 RX ORDER — POTASSIUM CHLORIDE 1500 MG/1
40 TABLET, EXTENDED RELEASE ORAL ONCE
Status: COMPLETED | OUTPATIENT
Start: 2024-03-12 | End: 2024-03-12

## 2024-03-12 RX ORDER — ESCITALOPRAM OXALATE 10 MG/1
10 TABLET ORAL DAILY
Status: DISCONTINUED | OUTPATIENT
Start: 2024-03-12 | End: 2024-03-12

## 2024-03-12 RX ORDER — POTASSIUM CHLORIDE 1500 MG/1
20 TABLET, EXTENDED RELEASE ORAL ONCE
Status: COMPLETED | OUTPATIENT
Start: 2024-03-12 | End: 2024-03-12

## 2024-03-12 RX ADMIN — SODIUM CHLORIDE: 9 INJECTION, SOLUTION INTRAVENOUS at 22:04

## 2024-03-12 RX ADMIN — SODIUM CHLORIDE: 9 INJECTION, SOLUTION INTRAVENOUS at 13:00

## 2024-03-12 RX ADMIN — SODIUM CHLORIDE: 9 INJECTION, SOLUTION INTRAVENOUS at 07:35

## 2024-03-12 RX ADMIN — POTASSIUM CHLORIDE 20 MEQ: 1500 TABLET, EXTENDED RELEASE ORAL at 20:21

## 2024-03-12 RX ADMIN — SODIUM CHLORIDE 1000 ML: 9 INJECTION, SOLUTION INTRAVENOUS at 15:27

## 2024-03-12 RX ADMIN — ESCITALOPRAM OXALATE 10 MG: 10 TABLET ORAL at 08:39

## 2024-03-12 RX ADMIN — POTASSIUM CHLORIDE 40 MEQ: 1500 TABLET, EXTENDED RELEASE ORAL at 10:23

## 2024-03-12 ASSESSMENT — ACTIVITIES OF DAILY LIVING (ADL)
ADLS_ACUITY_SCORE: 31
ADLS_ACUITY_SCORE: 42
ADLS_ACUITY_SCORE: 44
ADLS_ACUITY_SCORE: 42
ADLS_ACUITY_SCORE: 35
ADLS_ACUITY_SCORE: 31
ADLS_ACUITY_SCORE: 39
ADLS_ACUITY_SCORE: 31
ADLS_ACUITY_SCORE: 44
ADLS_ACUITY_SCORE: 44
ADLS_ACUITY_SCORE: 42
ADLS_ACUITY_SCORE: 41
ADLS_ACUITY_SCORE: 44
ADLS_ACUITY_SCORE: 39
ADLS_ACUITY_SCORE: 42
ADLS_ACUITY_SCORE: 31
ADLS_ACUITY_SCORE: 42
ADLS_ACUITY_SCORE: 42
ADLS_ACUITY_SCORE: 35
ADLS_ACUITY_SCORE: 44
ADLS_ACUITY_SCORE: 44
ADLS_ACUITY_SCORE: 42
ADLS_ACUITY_SCORE: 35

## 2024-03-12 ASSESSMENT — CHADS2 SCORE: AGE GREATER THAN OR EQUAL TO 75: YES

## 2024-03-12 NOTE — PROGRESS NOTES
RECEIVING UNIT ED HANDOFF REVIEW    ED Nurse Handoff Report was reviewed by: Nikki Gayle RN on March 11, 2024 at 9:01 PM

## 2024-03-12 NOTE — PHARMACY-ADMISSION MEDICATION HISTORY
Pharmacist Admission Medication History    Admission medication history is complete. The information provided in this note is only as accurate as the sources available at the time of the update.    Information Source(s): Patient and CareEverywhere/SureScripts via in-person    Pertinent Information: patient did not specify last doses for vitamin D and multivitamin in interview - marked as today to prevent duplicates if reordered in evening 3/11/24.     Changes made to PTA medication list:  Added: None  Deleted: aspirin 325 mg daily  Changed: lexapro 2.5 mg --> 10 mg daily, ibuprofen tablet strength/PRN directions    Allergies reviewed with patient and updates made in EHR: yes    Medication History Completed By: Luna Antoine [facility] 3/11/2024 8:08 PM    PTA Med List   Medication Sig Last Dose    biotin 1000 MCG TABS tablet Take 500 mcg by mouth daily Unknown at does not take regularly    escitalopram (LEXAPRO) 10 MG tablet Take 10 mg by mouth daily 3/11/2024 at AM    ibuprofen (ADVIL/MOTRIN) 200 MG capsule Take 200-400 mg by mouth every 6 hours as needed for fever or inflammatory pain Unknown at PRN    Multiple Vitamin (MULTI VITAMIN DAILY PO) Take 1 tablet by mouth daily  3/11/2024 at unknown time    senna-docusate (SENOKOT-S/PERICOLACE) 8.6-50 MG tablet Take 1 tablet by mouth 2 times daily as needed for constipation Unknown at PRN    Vitamin D, Cholecalciferol, 25 MCG (1000 UT) TABS Take 25 mcg by mouth daily 3/11/2024 at unknown time

## 2024-03-12 NOTE — PLAN OF CARE
Summary:  Observation status, came from ED for Fall, Rhabdomyolysis, and generalized weakness.   DATE & TIME: 3/11/49750 1702-0882    Cognitive Concerns/ Orientation : A&O x4, pleasant  BEHAVIOR & AGGRESSION TOOL COLOR: Green  CIWA SCORE: na   ABNL VS/O2: VSS, RA  MOBILITY: Assist of 2 GBW, Turn&repo q2hrs  PAIN MANAGMENT: Denies, however when turning&repositioning pt seems to be in pain.   DIET: Regular  BOWEL/BLADDER: Continent per patient (Purewick in place BID)  ABNL LAB/BG: CK 4,331, AM labs pending  DRAIN/DEVICES: L PIV NS 125mL/hr  TELEMETRY RHYTHM: na  SKIN: Redness/blanchable coccyx and buttocks, Bruising R should bilaterally. Scattered abrasions. Redness/rash on chest and under breast. Scab on L lower arm.  TESTS/PROCEDURES: CT completed  D/C DAY/GOALS/PLACE: Likely discharge to TCU. SW consulted.   OTHER IMPORTANT INFO: PT/OT and SW consulted.     Admission    Patient arrives to room 619 via cart from ED.    Inpatient nursing criteria listed below were met:    Did you put disposition on whiteboard and in sticky note: Yes  Full skin assessment done (add LDA if skin issue present). Initials of 2nd RN :Yes w/ M.S)  Isolation education started/completed NA  Patient allergies verified with patient: Yes  Fall Risk? (Care plan updated, Education given and documented) Yes  Primary Care Plan initiated: Yes  Home medications documented in belongings flowsheet: Yes  Patient belongings documented in belongings flowsheet: Yes  Reminder note (belongings/ medications) placed in discharge instructions:Yes  Admission profile/ required documentation complete: Yes  If patient is a 72 hour hold/Commitment are belongings removed from room and locked up? NA

## 2024-03-12 NOTE — UTILIZATION REVIEW
Admission Status; Secondary Review Determination     Admission Date: 3/11/2024  8:32 AM       Under the authority of the Utilization Management Committee, the utilization review process indicated a secondary review on the above patient.  The review outcome is based on review of the medical records, discussions with staff, and applying clinical experience noted on the date of the review.        (x)      Inpatient Status Appropriate - This patient's medical care is consistent with medical management for inpatient care and reasonable inpatient medical practice.       RATIONALE FOR DETERMINATION      Brief clinical presentation, information copied from the chart, abbreviated and edited for relevant content:       Meets IP per guidelines. Paged team to advance.       Dorothea Dugan is a 78 year old female medical history significant for depression, anxiety, hyperparathyroidism, history of hip fracture status post LAURA, and gait instability who was registered to observation on 3/11/2024 after sustaining a mechanical fall and found to be generally weak with an elevated CK after laying on the ground for hours, consistent with rhabdomyolysis. CK level  was 4331 so admitted initially to OBS. CT pelvis and bone without contrast showed no acute fracture, age-indeterminate compression fracture of L4 vertebral body with mild loss of height, status post right hip arthroplasty.  Patient unable to ambulate in the ED. CT head negative for acute pathology   Repeat CK 4812. S/P 1 L IVF bolus, mIVF with NS at 125 ccs/hr and will continue to monitor CK and renal function. Not discharging. Still on IVF, and K replacement protocol.             At the time of admission with the information available to the attending physician, more than 2 nights hospital complex care was anticipated. Also, there was a risk of adverse outcome if patient was treated outpatient or observation. High intensity of services anticipated. Inpatient admission  appropriate based on Medicare guidelines.       The information on this document is developed by the utilization review team in order for the business office to ensure compliance.  This only denotes the appropriateness of proper admission status and does not reflect the quality of care rendered.         The definitions of Inpatient Status and Observation Status used in making the determination above are those provided in the CMS Coverage Manual, Chapter 1 and Chapter 6, section 70.4.      Sincerely,      Sheila Barron MD   Utilization Review/ Case Management  [facility].

## 2024-03-12 NOTE — PLAN OF CARE
Summary: Fall, Rhabdomyolysis, and generalized weakness.   DATE & TIME: 03/12/24 9089-9191  Cognitive Concerns/ Orientation : A & O x 4, flat/withdrawn. Appears mildly forgetful.   BEHAVIOR & AGGRESSION TOOL COLOR: Green  ABNL VS/O2: VSS on RA.  MOBILITY: Strong A2 stand/pivot. Unable to ambulate. Very slow moving. R leg weaker/hyperextends compared to L. Up in chair x 2, and to BSC x 1.   PAIN MANAGMENT: Denies, when asked.   DIET: Regular, fair appetite.   BOWEL/BLADDER: Continent per patient (Purewick in place in bed, patnet) BM x1 today.  ABNL LAB/BG: K+ 2.9, replaced and recheck 3.4. Giving another dose. CK still elevated 4812, AST 92, and Calcium 7.9.   DRAIN/DEVICES: New PIV placed and running NS @ 125 mL/hr.   SKIN: Redness/blanchable coccyx and buttocks, Bruising everywhere bilaterally, significant one on R rib. Scattered abrasions. Redness/rash on chest and under breast. Scab on L lower arm.  TESTS/PROCEDURES: None scheduled.   D/C DAY/GOALS/PLACE: Discharge to TCU tomorrow pending labs.   OTHER IMPORTANT INFO: PT has seen, and SW see notes. No complaints other than feeling weak. Gets very anxious and has a hard time expressing herself. Bilateral tremors. NS bolus x 1.

## 2024-03-12 NOTE — PROGRESS NOTES
[facility]    Medicine Progress Note - Hospitalist Service    Date of Admission:  3/11/2024    Assessment & Plan   Dorothea Dugan is a 78 year old female medical history significant for depression, anxiety, hyperparathyroidism, history of hip fracture status post LAURA, and gait instability who was registered to observation on 3/11/2024 after sustaining a mechanical fall and found to be generally weak with an elevated CK after laying on the ground for hours, consistent with rhabdomyolysis.     Rhabdomyolysis  Generalized weakness  Mechanical fall  L4 compression fracture, suspect chronic (no acute pain)  Presented with weakness and a mechanical fall at home in the bathroom as her walker could not fit in the bathroom and she says she tripped over a rug and laid on the ground for several hours before her  found her.  Initially was to admit as group home care however a CK level was added on as she had laid on the ground for hours and resulted at 4331 observation admission for IV fluid and monitoring of CK level.  UA noninfectious appearing, notes moderate blood, 29 RBC, in setting of elevated CK.  CT pelvis and bone without contrast showed no acute fracture, age-indeterminate compression fracture of L4 vertebral body with mild loss of height, status post right hip arthroplasty.  Patient unable to ambulate in the ED after multiple to pull attempts, she has no spinal tenderness and no complaints of pelvic, hip, or other pain upon attempting to get up just felt weak, which is consistent with rhabdo. CT head negative for acute pathology   *Repeat CK 4812  -1 L IVF bolus, mIVF with NS at 125 ccs/hr   -Monitor renal function CK level  -Supportive care  -PT consulted, recommend TCU   -SW for discharge planning   -CK in the AM     Hypokalemia: K 2.9 on 3/12. Reports stable appetite, no N/V/D.   - Potassium replacement protocol in place   - Check Mg     Normocytic anemia: Suspect dilutional in the  setting of IVF above. No active signs of bleeding.   - Monitor     Recent Labs   Lab 03/12/24  0834 03/11/24  1032   HGB 11.6* 12.5      Tremor, resting, R>L  Patient reports tremor and shuffling gait for many months now.  They have not seen neurology.  She has been deconditioned and previously working with physical therapy up until November when they went to [place] for the winter.  Some question as to whether she could have an undiagnosed Parkinson's.  It would be difficult to assess this in the setting of elevated CK and weakness.  -Neurology referral on discharge, discussed with patient who is in agreement      Chronic stable diagnoses and other pertinent medical history: Appropriate PTA medications will be resumed. Nonessential medications will be held if patient is observation status.   Hyperparathyroidism  History of right femoral neck hip fracture status post LAURA 2020  Anxiety  Depression       Observation Goals: -diagnostic tests and consults completed and resulted, -vital signs normal or at patient baseline, -safe disposition plan has been identified, Nurse to notify provider when observation goals have been met and patient is ready for discharge.  Diet: Regular Diet Adult    DVT Prophylaxis: Pneumatic Compression Devices  Gagnon Catheter: Not present  Lines: None     Cardiac Monitoring: None  Code Status: No CPR- Do NOT Intubate      Clinically Significant Risk Factors Present on Admission        # Hypokalemia: Lowest K = 2.9 mmol/L in last 2 days, will replace as needed   # Hypocalcemia: Lowest Ca = 7.9 mg/dL in last 2 days, will monitor and replace as appropriate                         Disposition Plan      Expected Discharge Date: 03/13/2024,  3:00 PM  Discharge Delays: Lab Result Pending (enter specific test & time in comments)  MD D/C Med Rec  MD D/C Order   OT Disposition recs needed  Placement - TCU  PT Disposition recs needed  PT New Consult needed  OT New Consult needed  Destination: home with  help/services;inpatient rehabilitation facility  Discharge Comments: Repeat/trend CK  PT recommending TCU   SW for discharge planning          The patient's care was discussed with the Attending Physician, Dr. Costello, Bedside Nurse, and Patient.    Juliana Salazar PA-C  Hospitalist Service  [facility]  Securely message with [employer] (more info)  Text page via [facility] Paging/Directory   ______________________________________________________________________    Interval History   Assumed care 3/12. Still with generalized weakness. No pain. Confirms the above hx.     Physical Exam   Vital Signs: Temp: 98.3  F (36.8  C) Temp src: Oral BP: 134/78 Pulse: 76   Resp: 16 SpO2: 97 % O2 Device: None (Room air)    Weight: 100 lbs 0 oz    CONSTITUTIONAL: Pt laying in bed, dressed in hospital garb. Appears comfortable. Cooperative with interview.   HEENT: Normocephalic, atraumatic.   CARDIOVASCULAR: RRR, no murmurs, rubs, or extra heart sounds appreciated. Pulses +2/4 and regular in upper and lower extremities, bilaterally.   RESPIRATORY: No increased work of breathing.  CTA, bilat; no wheezes, rales, or rhonchi appreciated.  GASTROINTESTINAL:  Abdomen soft, non-distended. BS auscultated in all four quadrants. Negative for tenderness to palpation.  No masses or organomegaly noted.  MUSCULOSKELETAL: No TTP of major joints including hips, knees, ankles. No spine TTP. No gross deformities noted. Normal muscle tone.   HEMATOLOGIC/LYMPHATIC/IMMUNOLOGIC: Negative for lower extremity edema, bilaterally.  NEUROLOGIC: Alert and oriented to person, place, and time.  strength intact. Resting tremor, R>L.   SKIN: Warm, dry, intact.     Medical Decision Making       50 MINUTES SPENT BY ME on the date of service doing chart review, history, exam, documentation & further activities per the note.      Data     I have personally reviewed the following data over the past 24 hrs:    6.3  \   11.6 (L)   /  208     138 104 10.1 /  92   2.9 (L) 24 0.70 \     ALT: N/A AST: N/A AP: N/A TBILI: N/A   ALB: N/A TOT PROTEIN: N/A LIPASE: N/A       Imaging results reviewed over the past 24 hrs:   Recent Results (from the past 24 hour(s))   CT Head w/o Contrast    Narrative    EXAM: CT HEAD W/O CONTRAST  LOCATION: [facility]  DATE: 3/11/2024    INDICATION: Head injury.  COMPARISON: None.  TECHNIQUE: Routine CT Head without IV contrast. Multiplanar reformats. Dose reduction techniques were used.    FINDINGS:  INTRACRANIAL CONTENTS: No intracranial hemorrhage, extraaxial collection, or mass effect.  No CT evidence of acute infarct. Mild presumed chronic small vessel ischemic changes. Mild to moderate generalized volume loss. No hydrocephalus.     VISUALIZED ORBITS/SINUSES/MASTOIDS: No intraorbital abnormality. No paranasal sinus mucosal disease. No middle ear or mastoid effusion.    BONES/SOFT TISSUES: No acute abnormality.      Impression    IMPRESSION:  1.  No acute intracranial process.

## 2024-03-12 NOTE — PROGRESS NOTES
Care Management Follow Up    Length of Stay (days): 0    Expected Discharge Date: 03/13/2024     Concerns to be Addressed: discharge planning     Patient plan of care discussed at interdisciplinary rounds: Yes    Anticipated Discharge Disposition:       Anticipated Discharge Services:    Anticipated Discharge DME:      Patient/family educated on Medicare website which has current facility and service quality ratings:    Education Provided on the Discharge Plan:    Patient/Family in Agreement with the Plan:      Referrals Placed by CM/SW:    Private pay costs discussed: private room/amenity fees    Additional Information:  Met with spouse at his request in patient's room.  Patient answered questions asked by spouse but otherwise did not speak during our visit.  The initial assessment was completed yesterday by the ED SW.   Spouse accepts recommendation for TCU and has requested [facility]. When spouse asked patient if she wants a private or [facility]-private she did respond a private.  Writer shared for these three TCU's the private room rate ranges between 45-62 $ per day.  Writer also reviewed the Medicare requirement for SNF coverage.   Spouse is hopeful patient will be here for the 3 night In Patient stay. Writer  explained the day of discharge is determined by the hospitalist and is medically based. Writer reviewed daily private pay for TCU averages $450.00 to $500.00 per day.  Gave spouse the large TCU list, the [facility] handout and business card.  Plan  Will follow for estimated discharge date and update spouse as we hear back from the TCU referrals.      DWAYNE WynnSW

## 2024-03-13 ENCOUNTER — APPOINTMENT (OUTPATIENT)
Dept: PHYSICAL THERAPY | Facility: CLINIC | Age: 79
DRG: 565 | End: 2024-03-13
Payer: MEDICARE

## 2024-03-13 LAB
ANION GAP SERPL CALCULATED.3IONS-SCNC: 12 MMOL/L (ref 7–15)
BUN SERPL-MCNC: 7.2 MG/DL (ref 8–23)
CALCIUM SERPL-MCNC: 7.6 MG/DL (ref 8.8–10.2)
CHLORIDE SERPL-SCNC: 107 MMOL/L (ref 98–107)
CK SERPL-CCNC: 2347 U/L (ref 26–192)
CREAT SERPL-MCNC: 0.64 MG/DL (ref 0.51–0.95)
DEPRECATED HCO3 PLAS-SCNC: 20 MMOL/L (ref 22–29)
EGFRCR SERPLBLD CKD-EPI 2021: >90 ML/MIN/1.73M2
GLUCOSE SERPL-MCNC: 172 MG/DL (ref 70–99)
HGB BLD-MCNC: 11.1 G/DL (ref 11.7–15.7)
MAGNESIUM SERPL-MCNC: 1.6 MG/DL (ref 1.7–2.3)
POTASSIUM SERPL-SCNC: 3.1 MMOL/L (ref 3.4–5.3)
POTASSIUM SERPL-SCNC: 3.4 MMOL/L (ref 3.4–5.3)
SODIUM SERPL-SCNC: 139 MMOL/L (ref 135–145)

## 2024-03-13 PROCEDURE — 97116 GAIT TRAINING THERAPY: CPT | Mod: GP

## 2024-03-13 PROCEDURE — 84132 ASSAY OF SERUM POTASSIUM: CPT | Performed by: PHYSICIAN ASSISTANT

## 2024-03-13 PROCEDURE — 99232 SBSQ HOSP IP/OBS MODERATE 35: CPT | Performed by: PHYSICIAN ASSISTANT

## 2024-03-13 PROCEDURE — 80048 BASIC METABOLIC PNL TOTAL CA: CPT | Performed by: PHYSICIAN ASSISTANT

## 2024-03-13 PROCEDURE — 250N000013 HC RX MED GY IP 250 OP 250 PS 637: Performed by: PHYSICIAN ASSISTANT

## 2024-03-13 PROCEDURE — 36415 COLL VENOUS BLD VENIPUNCTURE: CPT | Performed by: PHYSICIAN ASSISTANT

## 2024-03-13 PROCEDURE — 99233 SBSQ HOSP IP/OBS HIGH 50: CPT | Performed by: HOSPITALIST

## 2024-03-13 PROCEDURE — 97530 THERAPEUTIC ACTIVITIES: CPT | Mod: GP

## 2024-03-13 PROCEDURE — 250N000013 HC RX MED GY IP 250 OP 250 PS 637: Performed by: HOSPITALIST

## 2024-03-13 PROCEDURE — 83735 ASSAY OF MAGNESIUM: CPT | Performed by: PHYSICIAN ASSISTANT

## 2024-03-13 PROCEDURE — 82550 ASSAY OF CK (CPK): CPT | Performed by: PHYSICIAN ASSISTANT

## 2024-03-13 PROCEDURE — 120N000001 HC R&B MED SURG/OB

## 2024-03-13 PROCEDURE — 258N000003 HC RX IP 258 OP 636: Performed by: PHYSICIAN ASSISTANT

## 2024-03-13 PROCEDURE — 85018 HEMOGLOBIN: CPT | Performed by: PHYSICIAN ASSISTANT

## 2024-03-13 RX ORDER — MULTIPLE VITAMINS W/ MINERALS TAB 9MG-400MCG
1 TAB ORAL DAILY
Status: DISCONTINUED | OUTPATIENT
Start: 2024-03-13 | End: 2024-03-15 | Stop reason: HOSPADM

## 2024-03-13 RX ORDER — POTASSIUM CHLORIDE 1500 MG/1
20 TABLET, EXTENDED RELEASE ORAL ONCE
Status: COMPLETED | OUTPATIENT
Start: 2024-03-13 | End: 2024-03-13

## 2024-03-13 RX ADMIN — SODIUM CHLORIDE: 9 INJECTION, SOLUTION INTRAVENOUS at 05:46

## 2024-03-13 RX ADMIN — POTASSIUM CHLORIDE 20 MEQ: 1500 TABLET, EXTENDED RELEASE ORAL at 13:56

## 2024-03-13 RX ADMIN — ESCITALOPRAM OXALATE 10 MG: 10 TABLET ORAL at 08:11

## 2024-03-13 RX ADMIN — POTASSIUM CHLORIDE 20 MEQ: 1500 TABLET, EXTENDED RELEASE ORAL at 20:04

## 2024-03-13 RX ADMIN — Medication 1 TABLET: at 13:56

## 2024-03-13 ASSESSMENT — ACTIVITIES OF DAILY LIVING (ADL)
ADLS_ACUITY_SCORE: 43
ADLS_ACUITY_SCORE: 42
ADLS_ACUITY_SCORE: 41
ADLS_ACUITY_SCORE: 42
ADLS_ACUITY_SCORE: 43
ADLS_ACUITY_SCORE: 41
ADLS_ACUITY_SCORE: 43
ADLS_ACUITY_SCORE: 41
ADLS_ACUITY_SCORE: 43
ADLS_ACUITY_SCORE: 41
ADLS_ACUITY_SCORE: 43
ADLS_ACUITY_SCORE: 42
ADLS_ACUITY_SCORE: 41
ADLS_ACUITY_SCORE: 41
ADLS_ACUITY_SCORE: 43
ADLS_ACUITY_SCORE: 41
ADLS_ACUITY_SCORE: 43
ADLS_ACUITY_SCORE: 41
ADLS_ACUITY_SCORE: 41
ADLS_ACUITY_SCORE: 43
ADLS_ACUITY_SCORE: 43

## 2024-03-13 NOTE — PROGRESS NOTES
[facility]    Medicine Progress Note - Hospitalist Service    Date of Admission:  3/11/2024    Assessment & Plan   Dorothea Dugan is a 78 year old female medical history significant for depression, anxiety, hyperparathyroidism, history of hip fracture status post LAURA, and gait instability who was registered to observation on 3/11/2024 after sustaining a mechanical fall and found to be generally weak with an elevated CK after laying on the ground for hours, consistent with rhabdomyolysis.     Rhabdomyolysis  Generalized weakness  Mechanical fall  L4 compression fracture, suspect chronic (no acute pain)  Presented with weakness and a mechanical fall at home in the bathroom as her walker could not fit in the bathroom and she says she tripped over a rug and laid on the ground for several hours before her  found her.  Initially was to admit as retirement care however a CK level was added on as she had laid on the ground for hours and resulted at 4331 observation admission for IV fluid and monitoring of CK level.    *UA noninfectious appearing, notes moderate blood, 29 RBC, in setting of elevated CK.    *CT pelvis and bone without contrast showed no acute fracture, age-indeterminate compression fracture of L4 vertebral body with mild loss of height, status post right hip arthroplasty.    Patient unable to ambulate in the ED after multiple to pull attempts, she has no spinal tenderness and no complaints of pelvic, hip, or other pain upon attempting to get up just felt weak, which is consistent with rhabdo. CT head negative for acute pathology   *Repeat CK 4812  -1 L IVF bolus, mIVF with NS at 125 ccs/hr, will decrease rate to 75 on 3/13  -Monitor renal function CK level  -Supportive care  -PT consulted, recommend TCU   -SW for discharge planning   -CK down to 2347 on 3/13     Hypokalemia: K 2.9 on 3/12. Reports stable appetite, no N/V/D.   - Potassium replacement protocol in place       Normocytic anemia: Suspect dilutional in the setting of IVF above. No active signs of bleeding.   *hgb 12.5>11.1  --continue to monitor     Tremor, resting, R>L  Patient reports tremor and shuffling gait for many months now.  They have not seen neurology.  She has been deconditioned and previously working with physical therapy up until November when they went to [place] for the winter.  Some question as to whether she could have an undiagnosed Parkinson's.  It would be difficult to assess this in the setting of elevated CK and weakness.  -Neurology referral on discharge, discussed with patient who is in agreement      Chronic stable diagnoses and other pertinent medical history: Appropriate PTA medications will be resumed. Nonessential medications will be held if patient is observation status.   Hyperparathyroidism  History of right femoral neck hip fracture status post LAURA 2020  Anxiety  Depression        Diet: Regular Diet Adult    DVT Prophylaxis: Pneumatic Compression Devices  Gagnon Catheter: Not present  Lines: None     Cardiac Monitoring: None  Code Status: No CPR- Do NOT Intubate      Clinically Significant Risk Factors Present on Admission        # Hypokalemia: Lowest K = 2.9 mmol/L in last 2 days, will replace as needed   # Hypocalcemia: Lowest Ca = 7.9 mg/dL in last 2 days, will monitor and replace as appropriate                         Disposition Plan      Expected Discharge Date: 03/15/2024    Discharge Delays: Placement - TCU  Destination: home with help/services;inpatient rehabilitation facility  Discharge Comments: Repeat/trend CK  PT recommending TCU   SW for discharge planning          The patient's care was discussed with Dr. Costello who agrees with the above plan     Cassidy Santos PA-C  Hospitalist Service  [facility]  Securely message with [employer] (more info)  Text page via [employer] Paging/Directory    ______________________________________________________________________    Interval History   Feeling a bit better today as she was able to sleep last night. Tremor baseline and unchanged. Voiding well. No pain. No CP, SOB, dizziness. Agreeable to TCU placement.     Physical Exam   Temp: 98.1  F (36.7  C) Temp src: Oral BP: 138/75 Pulse: 64   Resp: 18 SpO2: 97 % O2 Device: None (Room air)    Vitals:    03/11/24 0840   Weight: 45.4 kg (100 lb)     Vital Signs with Ranges  Temp:  [98.1  F (36.7  C)] 98.1  F (36.7  C)  Pulse:  [64-84] 64  Resp:  [18] 18  BP: (136-138)/(75-80) 138/75  SpO2:  [96 %-97 %] 97 %  I/O last 3 completed shifts:  In: 1000 [IV Piggyback:1000]  Out: 1650 [Urine:1650]    Constitutional: Alert and oriented, sitting up in chair. Appears comfortable and is appropriately conversant.   ENT:  moist mucous membranes  Respiratory: Lungs clear to auscultation bilaterally, no increased work of breathing  Cardiovascular: Regular rate and rhythm, trace pedal edema   GI: active bowel sounds, abdomen soft, non-tender  Skin/Integumen: dry flaky skin to legs  Neuro:  speech is clear. Face symmetric. Gait not observed. Fine resting tremor RUE.       Medical Decision Making       60 MINUTES SPENT BY ME on the date of service doing chart review, history, exam, documentation & further activities per the note.      Data     I have personally reviewed the following data over the past 24 hrs:    6.3  \   11.6 (L)   / 208     138 104 10.1 /  92   3.5 24 0.70 \       Imaging results reviewed over the past 24 hrs:   No results found for this or any previous visit (from the past 24 hour(s)).

## 2024-03-13 NOTE — PROGRESS NOTES
Care Management Follow Up    Length of Stay (days): 1    Expected Discharge Date: 03/13/2024     Concerns to be Addressed: discharge planning     Patient plan of care discussed at interdisciplinary rounds: Yes    Anticipated Discharge Disposition:       Anticipated Discharge Services:    Anticipated Discharge DME:      Patient/family educated on Medicare website which has current facility and service quality ratings:    Education Provided on the Discharge Plan:    Patient/Family in Agreement with the Plan:      Referrals Placed by CM/SW:    Private pay costs discussed: private room/amenity fees and transportation costs    Additional Information  [facility]  Will ask MD when discharge is anticipated.     DWAYNE WynnSW

## 2024-03-13 NOTE — PLAN OF CARE
Goal Outcome Evaluation:    Summary: Fall, Rhabdomyolysis, and generalized weakness.   DATE & TIME: 3/12/24-3/13/24 6984-3236  Cognitive Concerns/ Orientation : A&O x 4, flat/withdrawn. Appears mildly forgetful when taking pills, couldn't remember taking some.   BEHAVIOR & AGGRESSION TOOL COLOR: Green  ABNL VS/O2: VSS on RA.  MOBILITY: Strong A2 stand/pivot. Unable to ambulate. Very slow moving. R leg weaker/hyperextends compared to L.   PAIN MANAGMENT: Denies, when asked.   DIET: Regular - tolerating.   BOWEL/BLADDER: Continent per patient (Purewick in place in bed overnight)   ABNL LAB/BG: K+ 2.9, replaced and recheck 3.4 - nexyt recheck 3/14 in am. Giving another dose. CK still elevated 4812, AST 92, and Calcium 7.9.   DRAIN/DEVICES: New PIV placed and running NS @ 125 mL/hr.   SKIN: Redness/blanchable coccyx and buttocks, Bruising R & L arms, Scattered scabbing/abrasions. Redness/rash on chest.   TESTS/PROCEDURES: None scheduled.   D/C DAY/GOALS/PLACE: Discharge to TCU pending labs. SW following.   OTHER IMPORTANT INFO: PT has seen, and SW see notes. No complaints other than feeling weak. Bilateral tremors.

## 2024-03-13 NOTE — CONSULTS
CLINICAL NUTRITION SERVICES  -  ASSESSMENT NOTE    Recommendations Ordered by Registered Dietitian (RD):   - Encourage adequate oral intake  - Encourage ensure x1 daily   - ordered MVI/M r/t malnutrition status      MALNUTRITION:  % Weight Loss:  Weight loss does not meet criteria for malnutrition   % Intake:  Decreased intake does not meet criteria for malnutrition   Subcutaneous Fat Loss:  Orbital region mild depletion  Muscle Loss:  moderate global muscle wasting   Fluid Retention:  None noted    Malnutrition Diagnosis: Moderate malnutrition  In Context of:  Acute illness or injury  Chronic illness or disease        REASON FOR ASSESSMENT  Dorothea Dugan is a 78 year old female seen by Registered Dietitian for +MST    Dorothea Dugan admitted for who was registered to observation on 3/11/2024 after sustaining a mechanical fall and found to be generally weak with an elevated CK after laying on the ground for hours, consistent with rhabdomyolysis.     PMH: depression, anxiety, hyperparathyroidism, history of hip fracture status post LAURA, and gait instability       NUTRITION HISTORY  Information obtained from pt.   - from home with . MD noted to be eating and drinking okay at home.   - Pt states she usually eats 3 meals/day at home. She does not note any recent changes to intake or concerns with recent wt loss. She has used ONS in the past but is does not consistently drink them.    CURRENT NUTRITION ORDERS  Diet Order:  Regular     Current Intake/Tolerance:   - Per [facility]: 3 meals ordered yesterday   - Per Flowsheet: 75% intakes  - Per pt report: appetite has returned to normal, denies n/v. Reports eating has been going well.   - RDN d/w pt small frequent meals to ensure she is getting enough calories/protein during the day, this will help to build her strength. RDN also offered to schedule ensures which pt was agreeable to. Ordered ensure with dinner per pt request.     Food Allergies/Intolerances:  "Strawberry extract    Labs: reviewed     Medications: reviewed; IVF    Skin: no edema or wounds noted     I/Os: last BM yesterday x1      ANTHROPOMETRICS  Height: 5' 0\"  Weight: 100 lbs 0 oz  Body mass index is 19.53 kg/m .  IBW: 100 lbs  %IBW: 100%  Weight History:   Wt Readings from Last 10 Encounters:   03/11/24 45.4 kg (100 lb)   08/20/21 46.2 kg (101 lb 14.4 oz)   07/23/21 47.6 kg (105 lb)   07/13/20 54.3 kg (119 lb 11.4 oz)   12/04/18 46.7 kg (103 lb)   09/10/18 50.8 kg (112 lb)   12/15/14 54.4 kg (120 lb)    - limited wt history per chart review   - Per pt, a good UBW is around 112-115 lbs, she admits that her wt commonly fluctuates.   - Per CE: 4/25/23 - 107 lbs. This is a 6.5% wt loss over the past year, which is not clinically significant.       ASSESSED NUTRITION NEEDS PER APPROVED PRACTICE GUIDELINES:    Dosing Weight 45.4 kg (admit wt)   Estimated Energy Needs: 9954-4657+ kcals (25-30 Kcal/Kg)  Justification: maintenance, 35 kcal/kg for wt gain   Estimated Protein Needs: 55-68 grams protein (1.2-1.5 g pro/Kg)  Justification: maintenance  Estimated Fluid Needs: 1 mL/Kcal  Justification: maintenance OR per provider pending fluid status      NUTRITION DIAGNOSIS:  Predicted inadequate nutrient intake related to frailty as evidenced by moderate muscle losses and report of intake.       NUTRITION INTERVENTIONS  Recommendations / Nutrition Prescription  See above.     Implementation  Nutrition education: discussed small frequent meals  Medical Food Supplement  MVI/M    Nutrition Goals  Consume % meals and/or supplements TID       MONITORING AND EVALUATION:  Progress towards goals will be monitored and evaluated per protocol and Practice Guidelines      Joanna Gillespie, MS, RD, LD  Clinical Dietitian  Pager: [phone]            "

## 2024-03-13 NOTE — PLAN OF CARE
Summary: Fall, Rhabdomyolysis, and generalized weakness.   DATE & TIME: 03/13/24 7016-9707   Cognitive Concerns/ Orientation : A & O x 4, flat/withdrawn. Anxious. Appears mildly forgetful.   BEHAVIOR & AGGRESSION TOOL COLOR: Green  ABNL VS/O2: VSS on RA.  MOBILITY: Strong A2 stand/pivot. Unable to ambulate. Very slow moving. R leg weaker/hyperextends compared to L. Turning as pt allows in bed. Up in chair x 1.   PAIN MANAGMENT: Denies, when asked.   DIET: Regular - tolerating.   BOWEL/BLADDER: Continent (Purewick in place in bed)   ABNL LAB/BG: K+ 3.4, replaced orally. Recheck this evening. Calcium 7.6, Hgb 11.1, and CK trending down 2347.   DRAIN/DEVICES: PIV infusing NS @ 75 mL/hr.   SKIN: Redness/blanchable coccyx and buttocks, Bruising everywhere bilaterally, significant one on R rib. Scattered abrasions. Redness/rash on chest and under breast. Scab on L lower arm. Pt's bilaterally feet are dry and peeling, foot care completed. New wound on side of L foot, cleansed and dressed.   TESTS/PROCEDURES: None scheduled.   D/C DAY/GOALS/PLACE: Discharge to TCU pending labs.   OTHER IMPORTANT INFO: PT following, and SW note says pt was accepted at a TCU. Bilateral tremors. Overall no changes today.  at bedside and updated.

## 2024-03-14 ENCOUNTER — APPOINTMENT (OUTPATIENT)
Dept: PHYSICAL THERAPY | Facility: CLINIC | Age: 79
DRG: 565 | End: 2024-03-14
Payer: MEDICARE

## 2024-03-14 ENCOUNTER — APPOINTMENT (OUTPATIENT)
Dept: GENERAL RADIOLOGY | Facility: CLINIC | Age: 79
DRG: 565 | End: 2024-03-14
Attending: PHYSICIAN ASSISTANT
Payer: MEDICARE

## 2024-03-14 LAB
ANION GAP SERPL CALCULATED.3IONS-SCNC: 9 MMOL/L (ref 7–15)
BUN SERPL-MCNC: 5.4 MG/DL (ref 8–23)
CALCIUM SERPL-MCNC: 7.9 MG/DL (ref 8.8–10.2)
CHLORIDE SERPL-SCNC: 106 MMOL/L (ref 98–107)
CK SERPL-CCNC: 1235 U/L (ref 26–192)
CREAT SERPL-MCNC: 0.7 MG/DL (ref 0.51–0.95)
DEPRECATED HCO3 PLAS-SCNC: 25 MMOL/L (ref 22–29)
EGFRCR SERPLBLD CKD-EPI 2021: 89 ML/MIN/1.73M2
GLUCOSE SERPL-MCNC: 106 MG/DL (ref 70–99)
HGB BLD-MCNC: 10.4 G/DL (ref 11.7–15.7)
MAGNESIUM SERPL-MCNC: 1.5 MG/DL (ref 1.7–2.3)
MAGNESIUM SERPL-MCNC: 2.4 MG/DL (ref 1.7–2.3)
POTASSIUM SERPL-SCNC: 3.2 MMOL/L (ref 3.4–5.3)
POTASSIUM SERPL-SCNC: 3.3 MMOL/L (ref 3.4–5.3)
POTASSIUM SERPL-SCNC: 3.3 MMOL/L (ref 3.4–5.3)
POTASSIUM SERPL-SCNC: 3.4 MMOL/L (ref 3.4–5.3)
SODIUM SERPL-SCNC: 140 MMOL/L (ref 135–145)

## 2024-03-14 PROCEDURE — 80048 BASIC METABOLIC PNL TOTAL CA: CPT | Performed by: HOSPITALIST

## 2024-03-14 PROCEDURE — 99233 SBSQ HOSP IP/OBS HIGH 50: CPT | Performed by: PHYSICIAN ASSISTANT

## 2024-03-14 PROCEDURE — 250N000013 HC RX MED GY IP 250 OP 250 PS 637: Performed by: PHYSICIAN ASSISTANT

## 2024-03-14 PROCEDURE — 84132 ASSAY OF SERUM POTASSIUM: CPT | Performed by: HOSPITALIST

## 2024-03-14 PROCEDURE — 82550 ASSAY OF CK (CPK): CPT | Performed by: PHYSICIAN ASSISTANT

## 2024-03-14 PROCEDURE — 97530 THERAPEUTIC ACTIVITIES: CPT | Mod: GP

## 2024-03-14 PROCEDURE — 258N000003 HC RX IP 258 OP 636: Performed by: PHYSICIAN ASSISTANT

## 2024-03-14 PROCEDURE — 71111 X-RAY EXAM RIBS/CHEST4/> VWS: CPT

## 2024-03-14 PROCEDURE — 99232 SBSQ HOSP IP/OBS MODERATE 35: CPT | Performed by: HOSPITALIST

## 2024-03-14 PROCEDURE — 97116 GAIT TRAINING THERAPY: CPT | Mod: GP

## 2024-03-14 PROCEDURE — 84132 ASSAY OF SERUM POTASSIUM: CPT | Performed by: PHYSICIAN ASSISTANT

## 2024-03-14 PROCEDURE — 36415 COLL VENOUS BLD VENIPUNCTURE: CPT | Performed by: PHYSICIAN ASSISTANT

## 2024-03-14 PROCEDURE — 120N000001 HC R&B MED SURG/OB

## 2024-03-14 PROCEDURE — 84443 ASSAY THYROID STIM HORMONE: CPT | Performed by: PHYSICIAN ASSISTANT

## 2024-03-14 PROCEDURE — 85018 HEMOGLOBIN: CPT | Performed by: PHYSICIAN ASSISTANT

## 2024-03-14 PROCEDURE — 83735 ASSAY OF MAGNESIUM: CPT | Performed by: PHYSICIAN ASSISTANT

## 2024-03-14 PROCEDURE — 250N000011 HC RX IP 250 OP 636: Performed by: PHYSICIAN ASSISTANT

## 2024-03-14 PROCEDURE — 36415 COLL VENOUS BLD VENIPUNCTURE: CPT | Performed by: HOSPITALIST

## 2024-03-14 RX ORDER — POTASSIUM CHLORIDE 1500 MG/1
20 TABLET, EXTENDED RELEASE ORAL ONCE
Status: COMPLETED | OUTPATIENT
Start: 2024-03-14 | End: 2024-03-14

## 2024-03-14 RX ORDER — SODIUM CHLORIDE AND POTASSIUM CHLORIDE 150; 900 MG/100ML; MG/100ML
INJECTION, SOLUTION INTRAVENOUS CONTINUOUS
Status: DISCONTINUED | OUTPATIENT
Start: 2024-03-14 | End: 2024-03-15 | Stop reason: HOSPADM

## 2024-03-14 RX ORDER — MAGNESIUM SULFATE HEPTAHYDRATE 40 MG/ML
2 INJECTION, SOLUTION INTRAVENOUS ONCE
Status: COMPLETED | OUTPATIENT
Start: 2024-03-14 | End: 2024-03-14

## 2024-03-14 RX ORDER — LIDOCAINE 4 G/G
1 PATCH TOPICAL
Status: DISCONTINUED | OUTPATIENT
Start: 2024-03-14 | End: 2024-03-15 | Stop reason: HOSPADM

## 2024-03-14 RX ORDER — AMMONIUM LACTATE 12 G/100G
CREAM TOPICAL
Status: DISCONTINUED | OUTPATIENT
Start: 2024-03-14 | End: 2024-03-14

## 2024-03-14 RX ORDER — AMMONIUM LACTATE 12 G/100G
LOTION TOPICAL
Status: DISCONTINUED | OUTPATIENT
Start: 2024-03-14 | End: 2024-03-15 | Stop reason: HOSPADM

## 2024-03-14 RX ADMIN — MAGNESIUM SULFATE HEPTAHYDRATE 2 G: 40 INJECTION, SOLUTION INTRAVENOUS at 13:36

## 2024-03-14 RX ADMIN — ESCITALOPRAM OXALATE 10 MG: 10 TABLET ORAL at 09:43

## 2024-03-14 RX ADMIN — POTASSIUM CHLORIDE 20 MEQ: 1500 TABLET, EXTENDED RELEASE ORAL at 13:44

## 2024-03-14 RX ADMIN — POTASSIUM CHLORIDE 20 MEQ: 1500 TABLET, EXTENDED RELEASE ORAL at 19:20

## 2024-03-14 RX ADMIN — SODIUM CHLORIDE: 9 INJECTION, SOLUTION INTRAVENOUS at 03:33

## 2024-03-14 RX ADMIN — Medication 1 TABLET: at 09:43

## 2024-03-14 RX ADMIN — POTASSIUM CHLORIDE AND SODIUM CHLORIDE: 900; 150 INJECTION, SOLUTION INTRAVENOUS at 15:24

## 2024-03-14 RX ADMIN — LIDOCAINE 1 PATCH: 4 PATCH TOPICAL at 21:45

## 2024-03-14 ASSESSMENT — ACTIVITIES OF DAILY LIVING (ADL)
ADLS_ACUITY_SCORE: 42
ADLS_ACUITY_SCORE: 42
ADLS_ACUITY_SCORE: 40
ADLS_ACUITY_SCORE: 40
ADLS_ACUITY_SCORE: 41
ADLS_ACUITY_SCORE: 42
ADLS_ACUITY_SCORE: 42
ADLS_ACUITY_SCORE: 40
ADLS_ACUITY_SCORE: 42
ADLS_ACUITY_SCORE: 40
ADLS_ACUITY_SCORE: 42
ADLS_ACUITY_SCORE: 40

## 2024-03-14 NOTE — PLAN OF CARE
Summary: Fall, Rhabdomyolysis, and generalized weakness.   DATE & TIME: 03/14/24 5799-6543   Cognitive Concerns/ Orientation :  A & O x 4, flat/withdrawn. Anxious. Appears mildly forgetful.   BEHAVIOR & AGGRESSION TOOL COLOR: Green  ABNL VS/O2: VSS on RA.   MOBILITY: Strong A2 stand/pivot. Very slow moving. Up in chair x1, need a lot of encouragement with activity.   PAIN MANAGMENT: Denies when asked.   DIET: Regular, poor appetite.   BOWEL/BLADDER: Continent (Purewick in place in bed). BM x1 on BSC.   ABNL LAB/BG: K+ 3.3, replaced. Recheck 3.2. Will replace again. Mg 1.5, replaced. Recheck 2.4. Calcium 7.9, Hgb 10.4, and CK 1235.  DRAIN/DEVICES: PIV infusing NS + KCl 20 mEq/L @ 75 mL/hr.   SKIN: Redness/blanchable coccyx and buttocks, scattered bruising, significant one on R rib. Scattered abrasions. Redness/rash on chest and under breast. Scab on L lower arm. Pt's bilaterally feet are dry and peeling, foot care completed. New wound on side of L foot, dressing CDI.   TESTS/PROCEDURES: X-ray of chest and ribs ordered.   D/C DAY/GOALS/PLACE: Discharge to TCU pending labs. Possible tomorrow.   OTHER IMPORTANT INFO: PT following, and SW note says pt was accepted at a TCU. Bilateral tremors.

## 2024-03-14 NOTE — PLAN OF CARE
Goal Outcome Evaluation:       03/13/24 [id number]  Cognitive Concerns/ Orientation : Alert and oriented x 4, forgetful.   BEHAVIOR & AGGRESSION TOOL COLOR: Green  ABNL VS/O2: VSS on RA.  MOBILITY: Strong A2 stand/pivot. Unable to ambulate. Very slow moving.   PAIN MANAGMENT: Denies   DIET: Regular   BOWEL/BLADDER: Continent (Purewick in place in bed)   ABNL LAB/BG: K+ 3.1, replaced and recheck due at 12:40 am,  Calcium 7.6, Hgb 11.1, and CK trending down 2347.   DRAIN/DEVICES: Peripheral IV left arm infusing NS at 75 ml per hour   SKIN: Redness/blanchable coccyx and buttocks, scattered bruising, significant one on R rib. Scattered abrasions. Redness/rash on chest and under breast. Scab on L lower arm. Pt's bilaterally feet are dry and peeling, foot care completed. New wound on side of L foot, cleansed and dressed.   TESTS/PROCEDURES: None scheduled.   D/C DAY/GOALS/PLACE: Discharge to TCU pending labs.   OTHER IMPORTANT INFO: PT following, and SW note says pt was accepted at a TCU. Bilateral tremors.

## 2024-03-14 NOTE — PLAN OF CARE
Summary: Fall, Rhabdomyolysis, and generalized weakness.   DATE & TIME: 03/13/24 4409-0428  Cognitive Concerns/ Orientation : Alert and oriented x 4, forgetful  BEHAVIOR & AGGRESSION TOOL COLOR: Green  ABNL VS/O2: /83  MOBILITY: Strong A2 stand/pivot. Unable to ambulate. Very slow moving; Anxious with turns  PAIN MANAGMENT: Denies   DIET: Regular   BOWEL/BLADDER: Continent (Purewick in place in bed)   ABNL LAB/BG: K+ 3.4, replaced on day/nivia,  Calcium 7.6, Hgb 11.1, and CK trending down 2347.   DRAIN/DEVICES: Peripheral IV left arm infusing NS at 75 ml per hour   SKIN: Redness/blanchable coccyx and buttocks, scattered bruising, significant one on R rib. Scattered abrasions. Redness/rash on chest and under breast. Scab on L lower arm. Pt's bilaterally feet are dry and peeling, foot care completed. New wound on side of L foot, cleansed and dressed.   TESTS/PROCEDURES: None scheduled.   D/C DAY/GOALS/PLACE: Discharge to TCU pending labs.   OTHER IMPORTANT INFO: PT following, and SW note says pt was accepted at a TCU. Bilateral tremors.

## 2024-03-14 NOTE — PROGRESS NOTES
[facility]    Medicine Progress Note - Hospitalist Service    Date of Admission:  3/11/2024    Assessment & Plan   Dorothea Dugan is a 78 year old female medical history significant for depression, anxiety, hyperparathyroidism, history of hip fracture status post LAURA, and gait instability who was registered to observation on 3/11/2024 after sustaining a mechanical fall and found to be generally weak with an elevated CK after laying on the ground for hours, consistent with rhabdomyolysis.     Rhabdomyolysis  Generalized weakness  Mechanical fall  L4 compression fracture, suspect chronic (no acute pain)  Presented with weakness and a mechanical fall at home in the bathroom as her walker could not fit in the bathroom and she says she tripped over a rug and laid on the ground for several hours before her  found her.  Initially was to admit as care home care however a CK level was added on as she had laid on the ground for hours and resulted at 4331 observation admission for IV fluid and monitoring of CK level.    *UA noninfectious appearing, notes moderate blood, 29 RBC, in setting of elevated CK.    *CT pelvis and bone without contrast showed no acute fracture, age-indeterminate compression fracture of L4 vertebral body with mild loss of height, status post right hip arthroplasty.    Patient unable to ambulate in the ED after multiple to pull attempts, she has no spinal tenderness and no complaints of pelvic, hip, or other pain upon attempting to get up just felt weak, which is consistent with rhabdo. CT head negative for acute pathology   -1 L IVF bolus, mIVF with NS at 125 ccs/hr, decreased rate to 75 on 3/13  -Monitor renal function CK level  -PT consulted, recommend TCU   - for discharge planning, plan discharge to TCU 3/15 tentatively pending improvement in CK   -CK down to 1235 on 3/14  -will need repeat UA in follow up to ensure resolution hematuria     Left rib pain   Reports  left sided rib pain with point tenderness on exam 3/14. Also has ecchymosis with small hematoma posterior ribs on right but not painful.   --XR ribs to eval for fracture  -lidocaine patch prn   --encourage IS     Hypokalemia:  Hypomagnesemia   K 2.9 on 3/12. Reports poor appetite at times, no N/V/D. K up to 3.3. but continues to require regular supplement.   - Potassium replacement protocol in place   --add nutritional supplement  --may need regular  supplement in the future, will try to add high K foods to start      Normocytic anemia: Suspect dilutional in the setting of IVF above. No active signs of bleeding.   *hgb 12.5>10.4  --continue to monitor     Tremor, resting, R>L  Patient reports tremor and shuffling gait for many months now.  They have not seen neurology.  She has been deconditioned and previously working with physical therapy up until November when they went to [place] for the winter.  Some question as to whether she could have an undiagnosed Parkinson's.  It would be difficult to assess this in the setting of elevated CK and weakness.  -Neurology referral on discharge, discussed with patient who is in agreement     Onychomycosis  Podiatry referral at discharge      Chronic stable diagnoses and other pertinent medical history: Appropriate PTA medications will be resumed. Nonessential medications will be held if patient is observation status.   Hyperparathyroidism  History of right femoral neck hip fracture status post LAURA 2020  Anxiety  Depression        Diet: Regular Diet Adult    DVT Prophylaxis: Pneumatic Compression Devices  Gagnon Catheter: Not present  Lines: None     Cardiac Monitoring: None  Code Status: No CPR- Do NOT Intubate          Diet: Regular Diet Adult  Snacks/Supplements Adult: Ensure Enlive; With Meals    DVT Prophylaxis: Pneumatic Compression Devices  Gagnon Catheter: Not present  Lines: None     Cardiac Monitoring: None  Code Status: No CPR- Do NOT Intubate      Clinically  "Significant Risk Factors        # Hypokalemia: Lowest K = 2.9 mmol/L in last 2 days, will replace as needed     # Hypomagnesemia: Lowest Mg = 1.6 mg/dL in last 2 days, will replace as needed               # Moderate Malnutrition: based on nutrition assessment, PRESENT ON ADMISSION          Disposition Plan     Expected Discharge Date: 03/15/2024    Discharge Delays: Placement - TCU  Destination: home with help/services;inpatient rehabilitation facility  Discharge Comments: Repeat/trend CK  PT recommending TCU   SW for discharge planning          The patient's care was discussed with Dr. Costello who agrees with the above plan     Cassidy Santos PA-C  Hospitalist Service  [facility]  Securely message with [employer] (more info)  Text page via [facility] Paging/Directory   ______________________________________________________________________    Interval History   Patient reports having a frustrating night due to purewick malfunction and being taken down for a test that was not for her. She feels she is moving a bit better but still much worse than she was before her fall. Her  noted she was weaker than normal while in [place] as they weren't very active and she wasn't doing her PT. Reports some left rib discomfort. She has bruising on right side but denies pain there.  Also reports right side \"doesn't work as well\" because of a prior ankle fracture,  shoulder fracture, and prior hip surgery but that is baseline.   Lengthy discussion about improving nutrition and hydration. Her appetite has been poor at times.   asked about RUSSELL appropriateness and I recommended they look into this ASAP in case it is needed in the future. Dorothea denies CP or shortness of breath. She is not dizzy.   We also discussed Neurology and podiatry referrals at discharge and tentative plan for TCU tomorrow     Physical Exam   Temp: 98.1  F (36.7  C) Temp src: Oral BP: 136/78 Pulse: 79   Resp: 16 SpO2: 95 % O2 " Device: None (Room air)    Vitals:    03/11/24 0840   Weight: 45.4 kg (100 lb)     Vital Signs with Ranges  Temp:  [98  F (36.7  C)-99  F (37.2  C)] 98.1  F (36.7  C)  Pulse:  [71-82] 79  Resp:  [16] 16  BP: (135-141)/(71-83) 136/78  SpO2:  [94 %-98 %] 95 %  I/O last 3 completed shifts:  In: 1315 [P.O.:180; I.V.:1135]  Out: 1450 [Urine:1450]    Constitutional: Alert and oriented, sitting up in bed. Appears comfortable and is appropriately conversant. anxious appearing at times. Unkempt   ENT:  moist mucous membranes  Respiratory: Lungs clear to auscultation bilaterally, no increased work of breathing  Cardiovascular: Regular rate and rhythm, trace pedal edema   GI: active bowel sounds, abdomen soft, non-tender  Skin/Integumen: dry flaky, scaly skin to legs and trunk. Ecchymosis R posterior rib cage with small soft hematoma. Toenails thickened and very long.   MSK:  tender left rib cage   Neuro:  speech is clear. Face symmetric. Gait not observed. Fine resting tremor RUE. Reduced AROM R ankle prior surgery otherwise strength equal bilaterally       Medical Decision Making       60 MINUTES SPENT BY ME on the date of service doing chart review, history, exam, documentation & further activities per the note.      Data     I have personally reviewed the following data over the past 24 hrs:    N/A  \   10.4 (L)   / N/A     140 106 5.4 (L) /  106 (H)   3.3 (L); 3.3 (L) 25 0.70 \       Imaging results reviewed over the past 24 hrs:   No results found for this or any previous visit (from the past 24 hour(s)).

## 2024-03-15 ENCOUNTER — DOCUMENTATION ONLY (OUTPATIENT)
Dept: GERIATRICS | Facility: CLINIC | Age: 79
End: 2024-03-15
Payer: MEDICARE

## 2024-03-15 ENCOUNTER — APPOINTMENT (OUTPATIENT)
Dept: PHYSICAL THERAPY | Facility: CLINIC | Age: 79
DRG: 565 | End: 2024-03-15
Payer: MEDICARE

## 2024-03-15 VITALS
TEMPERATURE: 98.1 F | WEIGHT: 100 LBS | DIASTOLIC BLOOD PRESSURE: 82 MMHG | BODY MASS INDEX: 19.63 KG/M2 | HEART RATE: 76 BPM | RESPIRATION RATE: 18 BRPM | SYSTOLIC BLOOD PRESSURE: 139 MMHG | HEIGHT: 60 IN | OXYGEN SATURATION: 96 %

## 2024-03-15 LAB
ANION GAP SERPL CALCULATED.3IONS-SCNC: 12 MMOL/L (ref 7–15)
BUN SERPL-MCNC: 5.8 MG/DL (ref 8–23)
CALCIUM SERPL-MCNC: 8.1 MG/DL (ref 8.8–10.2)
CHLORIDE SERPL-SCNC: 106 MMOL/L (ref 98–107)
CK SERPL-CCNC: 828 U/L (ref 26–192)
CREAT SERPL-MCNC: 0.63 MG/DL (ref 0.51–0.95)
DEPRECATED HCO3 PLAS-SCNC: 21 MMOL/L (ref 22–29)
EGFRCR SERPLBLD CKD-EPI 2021: >90 ML/MIN/1.73M2
GLUCOSE SERPL-MCNC: 96 MG/DL (ref 70–99)
HGB BLD-MCNC: 12.3 G/DL (ref 11.7–15.7)
POTASSIUM SERPL-SCNC: 3.7 MMOL/L (ref 3.4–5.3)
POTASSIUM SERPL-SCNC: 4.1 MMOL/L (ref 3.4–5.3)
SODIUM SERPL-SCNC: 139 MMOL/L (ref 135–145)
TSH SERPL DL<=0.005 MIU/L-ACNC: 3.2 UIU/ML (ref 0.3–4.2)

## 2024-03-15 PROCEDURE — 80048 BASIC METABOLIC PNL TOTAL CA: CPT | Performed by: PHYSICIAN ASSISTANT

## 2024-03-15 PROCEDURE — 85018 HEMOGLOBIN: CPT | Performed by: PHYSICIAN ASSISTANT

## 2024-03-15 PROCEDURE — 250N000011 HC RX IP 250 OP 636: Performed by: PHYSICIAN ASSISTANT

## 2024-03-15 PROCEDURE — 82550 ASSAY OF CK (CPK): CPT | Performed by: PHYSICIAN ASSISTANT

## 2024-03-15 PROCEDURE — 99239 HOSP IP/OBS DSCHRG MGMT >30: CPT | Performed by: PHYSICIAN ASSISTANT

## 2024-03-15 PROCEDURE — 36415 COLL VENOUS BLD VENIPUNCTURE: CPT | Performed by: PHYSICIAN ASSISTANT

## 2024-03-15 PROCEDURE — 999N000111 HC STATISTIC OT IP EVAL DEFER

## 2024-03-15 PROCEDURE — 97116 GAIT TRAINING THERAPY: CPT | Mod: GP

## 2024-03-15 PROCEDURE — 250N000013 HC RX MED GY IP 250 OP 250 PS 637: Performed by: PHYSICIAN ASSISTANT

## 2024-03-15 PROCEDURE — 99239 HOSP IP/OBS DSCHRG MGMT >30: CPT | Performed by: HOSPITALIST

## 2024-03-15 PROCEDURE — 97530 THERAPEUTIC ACTIVITIES: CPT | Mod: GP

## 2024-03-15 RX ORDER — LANOLIN ALCOHOL/MO/W.PET/CERES
CREAM (GRAM) TOPICAL PRN
DISCHARGE
Start: 2024-03-15 | End: 2024-03-20

## 2024-03-15 RX ORDER — POTASSIUM CHLORIDE 750 MG/1
20 CAPSULE, EXTENDED RELEASE ORAL DAILY
DISCHARGE
Start: 2024-03-15 | End: 2024-03-18

## 2024-03-15 RX ORDER — ACETAMINOPHEN 325 MG/1
650 TABLET ORAL EVERY 4 HOURS PRN
DISCHARGE
Start: 2024-03-15 | End: 2024-03-18

## 2024-03-15 RX ORDER — LIDOCAINE 4 G/G
1 PATCH TOPICAL EVERY 24 HOURS
DISCHARGE
Start: 2024-03-16

## 2024-03-15 RX ORDER — MULTIPLE VITAMINS W/ MINERALS TAB 9MG-400MCG
1 TAB ORAL DAILY
DISCHARGE
Start: 2024-03-16 | End: 2024-04-16

## 2024-03-15 RX ADMIN — ESCITALOPRAM OXALATE 10 MG: 10 TABLET ORAL at 09:58

## 2024-03-15 RX ADMIN — Medication 1 TABLET: at 09:58

## 2024-03-15 RX ADMIN — POTASSIUM CHLORIDE AND SODIUM CHLORIDE: 900; 150 INJECTION, SOLUTION INTRAVENOUS at 03:25

## 2024-03-15 ASSESSMENT — ACTIVITIES OF DAILY LIVING (ADL)
ADLS_ACUITY_SCORE: 39
ADLS_ACUITY_SCORE: 39
ADLS_ACUITY_SCORE: 41
ADLS_ACUITY_SCORE: 39
ADLS_ACUITY_SCORE: 39
ADLS_ACUITY_SCORE: 41
ADLS_ACUITY_SCORE: 39
ADLS_ACUITY_SCORE: 39
ADLS_ACUITY_SCORE: 41
ADLS_ACUITY_SCORE: 39
ADLS_ACUITY_SCORE: 40
ADLS_ACUITY_SCORE: 39

## 2024-03-15 NOTE — PLAN OF CARE
Occupational Therapy: Orders received. Chart reviewed and discussed with care team.? Occupational Therapy not indicated due to per PT, pt anticipated to discharge this date.? Defer discharge recommendations to care team.? Will complete orders.

## 2024-03-15 NOTE — PLAN OF CARE
Goal Outcome Evaluation:      Plan of Care Reviewed With: patient    Overall Patient Progress: improvingOverall Patient Progress: improving     Summary: Fall, Rhabdomyolysis, and generalized weakness.     DATE & TIME: 03/14/2024- 03/15/2024 0517-0720    Cognitive Concerns/ Orientation:  A & O x4  BEHAVIOR & AGGRESSION TOOL COLOR: Green  ABNL VS/O2: VSS on RA.   MOBILITY: Strong A2 stand/pivot  PAIN MANAGMENT: Denied pain this shift   DIET: Regular  BOWEL/BLADDER: Continent; Purewick in place overnight per pt. Request   ABNL LAB/BG: K+ 3.7, Mg 2.4- Rechecks placed for AM; Calcium 7.9, Hgb 10.4, and CK 1235.  DRAIN/DEVICES: L PIV infusing NS + KCl 20 mEq/L @ 75 mL/hr.   SKIN: Redness/blanchable coccyx and buttocks, scattered bruising, significant one on R rib. Scattered abrasions. Redness/rash on chest and under breast. Scab on L lower arm. Pt's bilaterally feet are dry and peeling. New wound on side of L foot, dressing CDI.   TESTS/PROCEDURES: None this shift    D/C DAY/GOALS/PLACE: Discharge to TCU pending labs. Possible 3/15    OTHER IMPORTANT INFO: PT following, and SW note says pt was accepted at a TCU.   X-ray showed rib fracture at 7th-9th ribs  Lidocaine patch In place on Left side rib area

## 2024-03-15 NOTE — PROGRESS NOTES
Care Management Discharge Note    Discharge Date: 03/15/2024       Discharge Disposition:  TCU    Discharge Services:  None    Discharge DME:  None    Discharge Transportation: family or friend will provide    Private pay costs discussed: Not applicable    Does the patient's insurance plan have a 3 day qualifying hospital stay waiver?  No    PAS Confirmation Code:    Patient/family educated on Medicare website which has current facility and service quality ratings:      Education Provided on the Discharge Plan:    Persons Notified of Discharge Plans: HUC, TCU, Family, Patient, Bedside Nurse, Charge nurse, and MD  Patient/Family in Agreement with the Plan:  Yes    Handoff Referral Completed: no  Additional Information:  Patient will be discharging today to [facility]. No Scripts are needed to be faxed. Patient has ride time set up between 2-3 with [facility] wheel chair transport. Patient has PAS completed and faxed to facility. Patient will discharge today to TCU.     Dave [facility]ENSt. [facility]  Care Management

## 2024-03-15 NOTE — DISCHARGE SUMMARY
[facility]  Hospitalist Discharge Summary      Date of Admission:  3/11/2024  Date of Discharge:  3/15/2024  Discharging Provider: Cassidy Santos PA-C  Discharge Service: Hospitalist Service    Discharge Diagnoses   Rhabdomyolysis  Generalized weakness  Mechanical fall  Left 7-9 rib fractures    L4 compression fracture, suspect chronic (no acute pain)  Hypokalemia  Hypomagnesemia  Normocytic anemia  Resting tremor  Onychomycosis   Hyperparathyroidism  History of right femoral neck hip fracture status post LAURA 2020  Anxiety  Depression    Clinically Significant Risk Factors     # Moderate Malnutrition: based on nutrition assessment      Follow-ups Needed After Discharge   Follow-up Appointments     Follow Up and recommended labs and tests      Follow up with nursing home provider in 3 days for hospital follow up.   Recheck potassium (BMP) and CK. Follow up rib fractures. Will need to   repeat UA at some point to ensure resolution of hematuria     Follow up with Podiatry for nail care. Referral placed    Follow up with Neurology for evaluation of chronic tremor, referral   placed.  You can call [facility] to schedule at [phone]              Discharge Disposition   Discharged to rehabilitation facility  Condition at discharge: Stable    Hospital Course   Rhabdomyolysis  Generalized weakness  Mechanical fall  L4 compression fracture, suspect chronic (no acute pain)  Presented with weakness and a mechanical fall at home in the bathroom as her walker could not fit in the bathroom and she says she tripped over a rug and laid on the ground for several hours before her  found her.  Initially was to admit as long-term care however a CK level was added on as she had laid on the ground for hours and resulted at 4331 observation admission for IV fluid and monitoring of CK level.    *UA noninfectious appearing, notes moderate blood, 29 RBC, in setting of elevated  CK.    *CT pelvis and bone without contrast showed no acute fracture, age-indeterminate compression fracture of L4 vertebral body with mild loss of height, status post right hip arthroplasty.    Patient unable to ambulate in the ED after multiple attempts, she has no spinal tenderness and no complaints of pelvic, hip, or other pain upon attempting to get up just felt weak, which is consistent with rhabdo. CT head negative for acute pathology   -managed with IVF throughout admission   -PT consulted, recommend TCU   -CK down to 828 at time of discharge   -will need repeat UA in follow up to ensure resolution hematuria      Left 7-9 rib fractures   Reports left sided rib pain with point tenderness on exam 3/14. Also has ecchymosis with small hematoma posterior ribs on right but not painful.   Xray shows small left pleural effusion and fractures L 7-9 ribs, none on right.   -lidocaine patch prn   --encourage IS at TCU     Hypokalemia  Hypomagnesemia   K 2.9 on 3/12. Reports poor appetite at times, no N/V/D. K up to 3.3. but continues to require regular supplement.   --add nutritional supplement  --start K 20meq daily x 7 days  --recheck at TCU, will also consult nutrition      Normocytic anemia: Suspect dilutional in the setting of IVF above. No active signs of bleeding.      Tremor, resting, R>L  Patient reports tremor and shuffling gait for many months now.  They have not seen neurology.  She has been deconditioned and previously working with physical therapy up until November when they went to [place] for the winter.  Some question as to whether she could have an undiagnosed Parkinson's.  It would be difficult to assess this in the setting of elevated CK and weakness.  -Neurology referral on discharge, discussed with patient who is in agreement      Onychomycosis  Podiatry referral at discharge      Chronic stable diagnoses and other pertinent medical history: Appropriate PTA medications will be resumed. Nonessential  medications will be held if patient is observation status.   Hyperparathyroidism  History of right femoral neck hip fracture status post LAURA 2020  Anxiety  Depression    Consultations This Hospital Stay   CARE MANAGEMENT / SOCIAL WORK IP CONSULT  PHYSICAL THERAPY ADULT IP CONSULT  CARE MANAGEMENT / SOCIAL WORK IP CONSULT  PHYSICAL THERAPY ADULT IP CONSULT  OCCUPATIONAL THERAPY ADULT IP CONSULT  NUTRITION SERVICES ADULT IP CONSULT  PHYSICAL THERAPY ADULT IP CONSULT  OCCUPATIONAL THERAPY ADULT IP CONSULT    Code Status   No CPR- Do NOT Intubate    Time Spent on this Encounter   I, Cassidy Santos PA-C, personally saw the patient today and spent greater than 30 minutes discharging this patient.       Cassidy Santos PA-C  [place] [address] [facility]  [address] [place] [address]  Phone: [phone]  ______________________________________________________________________    Physical Exam   Temp: 98.1  F (36.7  C) Temp src: Oral BP: 139/82 Pulse: 76   Resp: 18 SpO2: 96 % O2 Device: None (Room air)    Vitals:    03/11/24 0840   Weight: 45.4 kg (100 lb)     Vital Signs with Ranges  Temp:  [98.1  F (36.7  C)-98.5  F (36.9  C)] 98.1  F (36.7  C)  Pulse:  [76-81] 76  Resp:  [18] 18  BP: (134-147)/(77-83) 139/82  SpO2:  [95 %-96 %] 96 %  I/O last 3 completed shifts:  In: 120 [P.O.:120]  Out: 2000 [Urine:2000]    Constitutional: Alert and oriented, sitting up in chair. Appears comfortable and is appropriately conversant. anxious appearing at times.   ENT:  moist mucous membranes  Respiratory: Lungs clear to auscultation bilaterally, no increased work of breathing  Cardiovascular: Regular rate and rhythm, trace pedal edema   GI: active bowel sounds, abdomen soft, non-tender  Skin/Integumen: very dry flaky, scaly skin to legs and trunk. Ecchymosis R posterior rib cage. Toenails thickened   MSK:  tender left rib cage   Neuro:  speech is clear. Face symmetric. Gait not observed. Fine  resting tremor RUE. Reduced AROM R ankle prior surgery otherwise strength equal bilaterally     Primary Care Physician   Miryam Dang    Discharge Orders      Orthopedic  Referral      Adult Neurology  Referral      General info for SNF    Length of Stay Estimate: Short Term Care: Estimated # of Days <30  Condition at Discharge: Stable  Level of care:skilled   Rehabilitation Potential: Good  Admission H&P remains valid and up-to-date: Yes  Recent Chemotherapy: N/A  Use Nursing Home Standing Orders: Yes     Mantoux instructions    Give two-step Mantoux (PPD) Per Facility Policy Yes     Reason for your hospital stay    You were here for evaluation after a fall     Intake and output    Every shift     Activity - Up with nursing assistance     Additional Discharge Instructions    Encourage frequent incentive spirometry    Social work to explore FDC resources     Encourage PO fluids     Follow Up and recommended labs and tests    Follow up with nursing home provider in 3 days for hospital follow up. Recheck potassium (BMP) and CK. Follow up rib fractures. Will need to repeat UA at some point to ensure resolution of hematuria     Follow up with Podiatry for nail care. Referral placed    Follow up with Neurology for evaluation of chronic tremor, referral placed.  You can call [facility] to schedule at [phone]     No CPR- Do NOT Intubate     Nutrition Services Adult IP Consult    Reason:  malnutrition     Physical Therapy Adult Consult    Evaluate and treat as clinically indicated.    Reason:  weakness, falls     Occupational Therapy Adult Consult    Evaluate and treat as clinically indicated.    Reason:  weakness, falls     Fall precautions     Diet    Follow this diet upon discharge: Orders Placed This Encounter      Snacks/Supplements Adult: Ensure Enlive; With Meals      Regular Diet Adult       Significant Results and Procedures   Most Recent 3 CBC's:  Recent Labs    Lab Test 03/15/24  1058 03/14/24  1047 03/13/24  1103 03/12/24  0834 03/11/24  1032 07/16/20  0630 07/15/20  0659 07/13/20  2220   WBC  --   --   --  6.3 10.5  --  14.7* 17.7*   HGB 12.3 10.4* 11.1* 11.6* 12.5   < > 10.6* 14.1   MCV  --   --   --  85 85  --   --  87   PLT  --   --   --  208 239  --   --  284    < > = values in this interval not displayed.     Most Recent 3 BMP's:  Recent Labs   Lab Test 03/15/24  1058 03/14/24  2340 03/14/24  1657 03/14/24  1047 03/13/24  1831 03/13/24  1103     --   --  140  --  139   POTASSIUM 4.1 3.7 3.2* 3.3*  3.3*   < > 3.4   CHLORIDE 106  --   --  106  --  107   CO2 21*  --   --  25  --  20*   BUN 5.8*  --   --  5.4*  --  7.2*   CR 0.63  --   --  0.70  --  0.64   ANIONGAP 12  --   --  9  --  12   TERENCE 8.1*  --   --  7.9*  --  7.6*   GLC 96  --   --  106*  --  172*    < > = values in this interval not displayed.     Most Recent Urinalysis:  Recent Labs   Lab Test 03/11/24  1200   COLOR Light Yellow   APPEARANCE Clear   URINEGLC Negative   URINEBILI Negative   URINEKETONE Trace*   SG 1.013   UBLD Moderate*   URINEPH 7.0   PROTEIN 20*   NITRITE Negative   LEUKEST Negative   RBCU 29*   WBCU 2     Most Recent CPK:  Recent Labs   Lab Test 03/15/24  1058 03/14/24  1047 03/13/24  1103   * 1,235* 2,347*   ,   Results for orders placed or performed during the hospital encounter of 03/11/24   CT Pelvis Bone wo Contrast    Narrative    CT PELVIS BONE WITHOUT CONTRAST  3/11/2024 9:23 AM    INDICATION: Pain after fall.    COMPARISON: Pelvic radiographs dated 7/14/2020.    TECHNIQUE: Noncontrast. Axial, sagittal and coronal thin-section  reconstruction. Dose reduction techniques were used.     FINDINGS:   BONES:  -There is diffuse osseous demineralization. No acute fracture is  identified. There are degenerative changes in the lower lumbar spine  and at the sacroiliac joints.    A remote, healed left inferior pubic ramus fracture is noted.    There is compression fracture  deformity of the L4 vertebral body with  mild loss of vertebral body height. This is age-indeterminate.    There are postoperative changes from right hip arthroplasty, the  femoral stem component of which extends beyond the field of view  distally.    SOFT TISSUES:  -Vascular atherosclerotic calcifications are noted. No free fluid is  identified within the pelvis. The bladder is distended. No drainable  fluid collection.      Impression    IMPRESSION:  1.  No acute pelvic fracture is identified. Given the degree of  osseous demineralization, MRI would be more sensitive for nondisplaced  fractures and should be considered if there is persistent clinical  concern.  2.  Age-indeterminate compression fracture of the L4 vertebral body  with mild loss of vertebral body height. Lumbar spine MR could better  evaluate as clinically indicated.  3.  Status post right hip arthroplasty, the femoral stem component of  which extends beyond the field of view distally. No periprosthetic  fracture is identified within the field of view. Consider dedicated  radiographs of the right femur if there is concern for a fracture at  the distal portion of the arthroplasty.    KAN VALVERDE MD         SYSTEM ID:  [id number]   CT Head w/o Contrast    Narrative    EXAM: CT HEAD W/O CONTRAST  LOCATION: [facility]  DATE: 3/11/2024    INDICATION: Head injury.  COMPARISON: None.  TECHNIQUE: Routine CT Head without IV contrast. Multiplanar reformats. Dose reduction techniques were used.    FINDINGS:  INTRACRANIAL CONTENTS: No intracranial hemorrhage, extraaxial collection, or mass effect.  No CT evidence of acute infarct. Mild presumed chronic small vessel ischemic changes. Mild to moderate generalized volume loss. No hydrocephalus.     VISUALIZED ORBITS/SINUSES/MASTOIDS: No intraorbital abnormality. No paranasal sinus mucosal disease. No middle ear or mastoid effusion.    BONES/SOFT TISSUES: No acute abnormality.       Impression    IMPRESSION:  1.  No acute intracranial process.   XR Ribs & Chest Bilateral G/E 4 Views    Narrative    EXAM: XR RIBS AND CHEST BILATERAL G/E 4 VIEWS  LOCATION: [facility]  DATE: 3/14/2024    INDICATION: rib pain after fall, assess for fracture. bilateral please  COMPARISON: None.      Impression    IMPRESSION: Small left pleural effusion. Lungs otherwise clear bilaterally. No pneumothorax. There are fractures of the left seventh through ninth ribs. No right-sided rib fractures are identified.       Discharge Medications   Current Discharge Medication List        START taking these medications    Details   acetaminophen (TYLENOL) 325 MG tablet Take 2 tablets (650 mg) by mouth every 4 hours as needed for mild pain or other (and adjunct with moderate or severe pain or per patient request)    Associated Diagnoses: Traumatic rhabdomyolysis, initial encounter (H24)      Lidocaine (LIDOCARE) 4 % Patch Place 1 patch onto the skin every 24 hours To prevent lidocaine toxicity, patient should be patch free for 12 hrs daily.    Associated Diagnoses: Closed fracture of multiple ribs of left side, initial encounter      mineral oil-white petrolatum (EUCERIN/MINERIN) cream Apply topically as needed for dry skin    Associated Diagnoses: Onychomycosis      multivitamin w/minerals (THERA-VIT-M) tablet Take 1 tablet by mouth daily    Associated Diagnoses: Hypokalemia      potassium chloride ER (MICRO-K) 10 MEQ CR capsule Take 2 capsules (20 mEq) by mouth daily for 7 days    Associated Diagnoses: Hypokalemia           CONTINUE these medications which have NOT CHANGED    Details   biotin 1000 MCG TABS tablet Take 500 mcg by mouth daily      escitalopram (LEXAPRO) 10 MG tablet Take 10 mg by mouth daily      Multiple Vitamin (MULTI VITAMIN DAILY PO) Take 1 tablet by mouth daily       senna-docusate (SENOKOT-S/PERICOLACE) 8.6-50 MG tablet Take 1 tablet by mouth 2 times daily as needed for  constipation      Vitamin D, Cholecalciferol, 25 MCG (1000 UT) TABS Take 25 mcg by mouth daily           STOP taking these medications       ibuprofen (ADVIL/MOTRIN) 200 MG capsule Comments:   Reason for Stopping:             Allergies   Allergies   Allergen Reactions    Strawberry Extract     Pcn [Penicillins] Rash

## 2024-03-15 NOTE — PLAN OF CARE
Goal Outcome Evaluation:  Summary: Fall, Rhabdomyolysis, and generalized weakness.     DATE & TIME: 03/15/2024 [id number]    Cognitive Concerns/ Orientation:  A & O x4  BEHAVIOR & AGGRESSION TOOL COLOR: Green  ABNL VS/O2: VSS on RA  MOBILITY: Strong A2 stand/pivot. Up to chair for breakfast and lunch.  PAIN MANAGMENT: Denies pain  DIET: Regular  BOWEL/BLADDER: Continent. Up to bedside commode. BM this shift.  ABNL LAB/BG:   DRAIN/DEVICES: IV removed  SKIN: Blanchable redness on sacrum, scattered bruising and abrasions.  TESTS/PROCEDURES: None this shift    D/C DAY/GOALS/PLACE: Discharge to [facility] today between 4464-1071.    Discharge    Patient discharged to [facility] via [facility] transportation. Paperwork and belongings given to transportation. All questions answered.    Listed belongings gathered and given to patient (including from security/pharmacy). Yes  Care Plan and Patient education resolved: Yes  Prescriptions if needed, hard copies sent with patient  Yes  Medication Bin checked and emptied on discharge Yes  SW/care coordinator/charge RN aware of discharge: Yes

## 2024-03-18 ENCOUNTER — TRANSITIONAL CARE UNIT VISIT (OUTPATIENT)
Dept: GERIATRICS | Facility: CLINIC | Age: 79
End: 2024-03-18
Payer: MEDICARE

## 2024-03-18 VITALS
SYSTOLIC BLOOD PRESSURE: 112 MMHG | DIASTOLIC BLOOD PRESSURE: 67 MMHG | BODY MASS INDEX: 17.89 KG/M2 | TEMPERATURE: 97.7 F | HEART RATE: 101 BPM | WEIGHT: 97.2 LBS | OXYGEN SATURATION: 97 % | HEIGHT: 62 IN | RESPIRATION RATE: 16 BRPM

## 2024-03-18 DIAGNOSIS — R26.89 DECREASED MOBILITY: ICD-10-CM

## 2024-03-18 DIAGNOSIS — E21.3 HYPERPARATHYROIDISM (H): ICD-10-CM

## 2024-03-18 DIAGNOSIS — F41.9 ANXIETY AND DEPRESSION: ICD-10-CM

## 2024-03-18 DIAGNOSIS — R53.81 PHYSICAL DECONDITIONING: ICD-10-CM
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
## 2024-03-18 DIAGNOSIS — S22.42XD CLOSED FRACTURE OF MULTIPLE RIBS OF LEFT SIDE WITH ROUTINE HEALING, SUBSEQUENT ENCOUNTER: ICD-10-CM

## 2024-03-18 DIAGNOSIS — E87.8 ELECTROLYTE ABNORMALITY: ICD-10-CM

## 2024-03-18 DIAGNOSIS — M62.81 GENERALIZED MUSCLE WEAKNESS: ICD-10-CM

## 2024-03-18 DIAGNOSIS — Z99.89 WALKER AS AMBULATION AID: ICD-10-CM

## 2024-03-18 DIAGNOSIS — F32.A ANXIETY AND DEPRESSION: ICD-10-CM

## 2024-03-18 DIAGNOSIS — T79.6XXD TRAUMATIC RHABDOMYOLYSIS, SUBSEQUENT ENCOUNTER: ICD-10-CM

## 2024-03-18 DIAGNOSIS — F32.5 DEPRESSION, MAJOR, IN REMISSION (H): ICD-10-CM

## 2024-03-18 DIAGNOSIS — G25.2 RESTING TREMOR: ICD-10-CM

## 2024-03-18 DIAGNOSIS — S72.001A HIP FRACTURE, RIGHT, CLOSED, INITIAL ENCOUNTER (H): ICD-10-CM

## 2024-03-18 DIAGNOSIS — S32.040S CLOSED COMPRESSION FRACTURE OF L4 LUMBAR VERTEBRA, SEQUELA: ICD-10-CM

## 2024-03-18 PROCEDURE — 99310 SBSQ NF CARE HIGH MDM 45: CPT | Performed by: NURSE PRACTITIONER

## 2024-03-18 RX ORDER — ACETAMINOPHEN 500 MG
1000 TABLET ORAL 3 TIMES DAILY
Status: SHIPPED
Start: 2024-03-18

## 2024-03-18 NOTE — PROGRESS NOTES
[facility] GERIATRICS    PRIMARY CARE PROVIDER AND CLINIC:  Miryam Dang DO, [address] / [place] [address]  Chief Complaint   Patient presents with    Hospital F/U      [facility] Medical Record Number:  [id number]  Place of Service where encounter took place:  [facility] (TCU) [[id number]]    Dorothea Dugan  is a 78 year old  (1945), admitted to the above facility from  [facility]. Hospital stay 3/11/24 through 3/15/24..     Past medical history includes  Fall  Decreased mobility  Rhabdomyolysis  Generalized weakness  Mechanical fall  Left 7-9 rib fractures    L4 compression fracture, suspect chronic (no acute pain)  Hypokalemia  Hypomagnesemia  Normocytic anemia  Resting tremor  Onychomycosis   Hyperparathyroidism  History of right femoral neck hip fracture status post LAURA 2020  Anxiety  Depression       On 3/11/2022, patient presented after a fall at home.  She laid on the ground for several hours before her  found her.  Her CK level was elevated to 4331 she was given IV fluids.  A CT pelvis showed no acute fractures.  Patient was unable to ambulate in the emergency room after multiple attempts.  Head CT was negative for acute pathology.   X-ray showed small left pleural effusion and L7-9 rib fractures.    Today she states she only has pain when when the strap or someone touches her rib fracture.  She denies shortness of breath.    CODE STATUS/ADVANCE DIRECTIVES DISCUSSION:  No CPR- Do NOT Intubate  DNR / DNI  ALLERGIES:   Allergies   Allergen Reactions    Strawberry Extract     Pcn [Penicillins] Rash      PAST MEDICAL HISTORY:   Past Medical History:   Diagnosis Date    Anxiety     Anxiety state, unspecified     Breast screening, unspecified     Fracture of head of femur (H)     Heart murmur     Insomnia, unspecified     Major depressive disorder, single episode, unspecified     Osteoporosis, unspecified     Parathyroid adenoma     Unspecified  menopausal and postmenopausal disorder       PAST SURGICAL HISTORY:   has a past surgical history that includes ENT surgery; GYN surgery; Open reduction internal fixation ankle (Right, 2000); closed rx prox humerus fracture (Right, 2010); Arthroplasty hip (Right, 7/14/2020); and Parathyroidectomy (Right, 8/20/2021).  FAMILY HISTORY: family history includes Lymphoma in her mother; Other - See Comments (age of onset: 95) in her mother; Prostate Cancer in her father.  SOCIAL HISTORY:   reports that she quit smoking about 46 years ago. Her smoking use included cigarettes. She started smoking about 57 years ago. She has a 0.2 pack-year smoking history. She has never used smokeless tobacco. She reports current alcohol use. She reports that she does not use drugs.  Patient's living condition: lives with spouse    Post Discharge Medication Reconciliation Status:   MED REC REQUIRED  Post Medication Reconciliation Status:  Discharge medications reconciled and changed, see notes/orders       Current Outpatient Medications   Medication Sig    acetaminophen (TYLENOL) 325 MG tablet Take 2 tablets (650 mg) by mouth every 4 hours as needed for mild pain or other (and adjunct with moderate or severe pain or per patient request)    biotin 1000 MCG TABS tablet Take 500 mcg by mouth daily    escitalopram (LEXAPRO) 10 MG tablet Take 10 mg by mouth daily    Lidocaine (LIDOCARE) 4 % Patch Place 1 patch onto the skin every 24 hours To prevent lidocaine toxicity, patient should be patch free for 12 hrs daily.    mineral oil-white petrolatum (EUCERIN/MINERIN) cream Apply topically as needed for dry skin    Multiple Vitamin (MULTI VITAMIN DAILY PO) Take 1 tablet by mouth daily     multivitamin w/minerals (THERA-VIT-M) tablet Take 1 tablet by mouth daily    potassium chloride ER (MICRO-K) 10 MEQ CR capsule Take 2 capsules (20 mEq) by mouth daily for 7 days    senna-docusate (SENOKOT-S/PERICOLACE) 8.6-50 MG tablet Take 1 tablet by mouth 2 times  "daily as needed for constipation    Vitamin D, Cholecalciferol, 25 MCG (1000 UT) TABS Take 25 mcg by mouth daily     No current facility-administered medications for this visit.       ROS:  4 point ROS including Respiratory, CV, GI and , other than that noted in the HPI,  is negative    Vitals:  /67   Pulse 101   Temp 97.7  F (36.5  C)   Resp 16   Ht 1.562 m (5' 1.5\")   Wt 44.1 kg (97 lb 3.2 oz)   SpO2 97%   BMI 18.07 kg/m    Exam:  GENERAL APPEARANCE:  Alert, thin  RESP:  respiratory effort and palpation of chest normal, lungs clear to auscultation   CV:  Palpation and auscultation of heart done , regular rate and rhythm, no murmur, rub, or gallop  PSYCH:  oriented X 3    Lab/Diagnostic data:  Most Recent 3 CBC's:  Recent Labs   Lab Test 03/15/24  1058 03/14/24  1047 03/13/24  1103 03/12/24  0834 03/11/24  1032 07/16/20  0630 07/15/20  0659 07/13/20  2220   WBC  --   --   --  6.3 10.5  --  14.7* 17.7*   HGB 12.3 10.4* 11.1* 11.6* 12.5   < > 10.6* 14.1   MCV  --   --   --  85 85  --   --  87   PLT  --   --   --  208 239  --   --  284    < > = values in this interval not displayed.     Most Recent 3 BMP's:  Recent Labs   Lab Test 03/15/24  1058 03/14/24  2340 03/14/24  1657 03/14/24  1047 03/13/24  1831 03/13/24  1103     --   --  140  --  139   POTASSIUM 4.1 3.7 3.2* 3.3*  3.3*   < > 3.4   CHLORIDE 106  --   --  106  --  107   CO2 21*  --   --  25  --  20*   BUN 5.8*  --   --  5.4*  --  7.2*   CR 0.63  --   --  0.70  --  0.64   ANIONGAP 12  --   --  9  --  12   TERENCE 8.1*  --   --  7.9*  --  7.6*   GLC 96  --   --  106*  --  172*    < > = values in this interval not displayed.     Most Recent 2 LFT's:  Recent Labs   Lab Test 03/11/24  1032   AST 92*   ALT 21   ALKPHOS 94   BILITOTAL 0.4       Most Recent TSH and T4:  Recent Labs   Lab Test 03/14/24  2340   TSH 3.20         ASSESSMENT/PLAN:    (R53.81) Physical deconditioning   (W19.XXXD) Fall, subsequent encounter  (R26.89) Decreased " mobility  (Z99.89) Walker as ambulation aid  (T79.6XXD) Traumatic rhabdomyolysis, subsequent encounter  (M62.81) Generalized muscle weakness  (S22.42XD) Closed fracture of multiple ribs of left side with routine healing, subsequent encounter  (S32.040S) Closed compression fracture of L4 lumbar vertebra, sequela  (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  Noted to have a chronic L4 compression fracture  And acute multiple rib fractures  For pain medication she has lidocaine patches, Tylenol,  Has bowel regimen  Plan: BMP and CBC  Stop current Tylenol order  Start Tylenol 1000 mg 3 times daily  Therapy to evaluate and treat      (G25.2) Resting tremor  Comment: Chronic.  Plan: Follow with neurology    (E87.8) Electrolyte abnormality  Comment: Acute.  Currently taking potassium  Plan: Discontinue potassium as it is difficult to swallow  Repeat BMP      (E21.3) Hyperparathyroidism (H24)  Comment: Status post parathyroidectomy with Dr. 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  Currently taking lexapro  Plan: Continue with plan of care no changes at this time, adjustment as needed          Total time spent with patient visit at the skilled nursing facility was 45 min including patient visit, review of past records, and [facility] record.     Electronically signed by:      ROXANNE Stafford CNP on 3/18/2024 at 6:46 PM

## 2024-03-18 NOTE — LETTER
3/18/2024        RE: Dorothea Dugan  [address]  [place] [address]        [facility] GERIATRICS    PRIMARY CARE PROVIDER AND CLINIC:  Miryam Dang DO, [address] / [place] [address]  Chief Complaint   Patient presents with     Hospital F/U      [place] Medical Record Number:  [id number]  Place of Service where encounter took place:  [facility] (TCU) [[id number]]    Dorothea Dugan  is a 78 year old  (1945), admitted to the above facility from  [facility]. Hospital stay 3/11/24 through 3/15/24..     Past medical history includes  Fall  Decreased mobility  Rhabdomyolysis  Generalized weakness  Mechanical fall  Left 7-9 rib fractures    L4 compression fracture, suspect chronic (no acute pain)  Hypokalemia  Hypomagnesemia  Normocytic anemia  Resting tremor  Onychomycosis   Hyperparathyroidism  History of right femoral neck hip fracture status post LAURA 2020  Anxiety  Depression       On 3/11/2022, patient presented after a fall at home.  She laid on the ground for several hours before her  found her.  Her CK level was elevated to 4331 she was given IV fluids.  A CT pelvis showed no acute fractures.  Patient was unable to ambulate in the emergency room after multiple attempts.  Head CT was negative for acute pathology.   X-ray showed small left pleural effusion and L7-9 rib fractures.    Today she states she only has pain when when the strap or someone touches her rib fracture.  She denies shortness of breath.    CODE STATUS/ADVANCE DIRECTIVES DISCUSSION:  No CPR- Do NOT Intubate  DNR / DNI  ALLERGIES:   Allergies   Allergen Reactions     Strawberry Extract      Pcn [Penicillins] Rash      PAST MEDICAL HISTORY:   Past Medical History:   Diagnosis Date     Anxiety      Anxiety state, unspecified      Breast screening, unspecified      Fracture of head of femur (H)      Heart murmur      Insomnia, unspecified      Major depressive disorder, single  episode, unspecified      Osteoporosis, unspecified      Parathyroid adenoma      Unspecified menopausal and postmenopausal disorder       PAST SURGICAL HISTORY:   has a past surgical history that includes ENT surgery; GYN surgery; Open reduction internal fixation ankle (Right, 2000); closed rx prox humerus fracture (Right, 2010); Arthroplasty hip (Right, 7/14/2020); and Parathyroidectomy (Right, 8/20/2021).  FAMILY HISTORY: family history includes Lymphoma in her mother; Other - See Comments (age of onset: 95) in her mother; Prostate Cancer in her father.  SOCIAL HISTORY:   reports that she quit smoking about 46 years ago. Her smoking use included cigarettes. She started smoking about 57 years ago. She has a 0.2 pack-year smoking history. She has never used smokeless tobacco. She reports current alcohol use. She reports that she does not use drugs.  Patient's living condition: lives with spouse    Post Discharge Medication Reconciliation Status:   MED REC REQUIRED  Post Medication Reconciliation Status:  Discharge medications reconciled and changed, see notes/orders       Current Outpatient Medications   Medication Sig     acetaminophen (TYLENOL) 325 MG tablet Take 2 tablets (650 mg) by mouth every 4 hours as needed for mild pain or other (and adjunct with moderate or severe pain or per patient request)     biotin 1000 MCG TABS tablet Take 500 mcg by mouth daily     escitalopram (LEXAPRO) 10 MG tablet Take 10 mg by mouth daily     Lidocaine (LIDOCARE) 4 % Patch Place 1 patch onto the skin every 24 hours To prevent lidocaine toxicity, patient should be patch free for 12 hrs daily.     mineral oil-white petrolatum (EUCERIN/MINERIN) cream Apply topically as needed for dry skin     Multiple Vitamin (MULTI VITAMIN DAILY PO) Take 1 tablet by mouth daily      multivitamin w/minerals (THERA-VIT-M) tablet Take 1 tablet by mouth daily     potassium chloride ER (MICRO-K) 10 MEQ CR capsule Take 2 capsules (20 mEq) by mouth  "daily for 7 days     senna-docusate (SENOKOT-S/PERICOLACE) 8.6-50 MG tablet Take 1 tablet by mouth 2 times daily as needed for constipation     Vitamin D, Cholecalciferol, 25 MCG (1000 UT) TABS Take 25 mcg by mouth daily     No current facility-administered medications for this visit.       ROS:  4 point ROS including Respiratory, CV, GI and , other than that noted in the HPI,  is negative    Vitals:  /67   Pulse 101   Temp 97.7  F (36.5  C)   Resp 16   Ht 1.562 m (5' 1.5\")   Wt 44.1 kg (97 lb 3.2 oz)   SpO2 97%   BMI 18.07 kg/m    Exam:  GENERAL APPEARANCE:  Alert, thin  RESP:  respiratory effort and palpation of chest normal, lungs clear to auscultation   CV:  Palpation and auscultation of heart done , regular rate and rhythm, no murmur, rub, or gallop  PSYCH:  oriented X 3    Lab/Diagnostic data:  Most Recent 3 CBC's:  Recent Labs   Lab Test 03/15/24  1058 03/14/24  1047 03/13/24  1103 03/12/24  0834 03/11/24  1032 07/16/20  0630 07/15/20  0659 07/13/20  2220   WBC  --   --   --  6.3 10.5  --  14.7* 17.7*   HGB 12.3 10.4* 11.1* 11.6* 12.5   < > 10.6* 14.1   MCV  --   --   --  85 85  --   --  87   PLT  --   --   --  208 239  --   --  284    < > = values in this interval not displayed.     Most Recent 3 BMP's:  Recent Labs   Lab Test 03/15/24  1058 03/14/24  2340 03/14/24  1657 03/14/24  1047 03/13/24  1831 03/13/24  1103     --   --  140  --  139   POTASSIUM 4.1 3.7 3.2* 3.3*  3.3*   < > 3.4   CHLORIDE 106  --   --  106  --  107   CO2 21*  --   --  25  --  20*   BUN 5.8*  --   --  5.4*  --  7.2*   CR 0.63  --   --  0.70  --  0.64   ANIONGAP 12  --   --  9  --  12   TERENCE 8.1*  --   --  7.9*  --  7.6*   GLC 96  --   --  106*  --  172*    < > = values in this interval not displayed.     Most Recent 2 LFT's:  Recent Labs   Lab Test 03/11/24  1032   AST 92*   ALT 21   ALKPHOS 94   BILITOTAL 0.4       Most Recent TSH and T4:  Recent Labs   Lab Test 03/14/24  2340   TSH 3.20 "         ASSESSMENT/PLAN:    (R53.81) Physical deconditioning   (W19.XXXD) Fall, subsequent encounter  (R26.89) Decreased mobility  (Z99.89) Walker as ambulation aid  (T79.6XXD) Traumatic rhabdomyolysis, subsequent encounter  (M62.81) Generalized muscle weakness  (S22.42XD) Closed fracture of multiple ribs of left side with routine healing, subsequent encounter  (S32.040S) Closed compression fracture of L4 lumbar vertebra, sequela  (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  Noted to have a chronic L4 compression fracture  And acute multiple rib fractures  For pain medication she has lidocaine patches, Tylenol,  Has bowel regimen  Plan: BMP and CBC  Stop current Tylenol order  Start Tylenol 1000 mg 3 times daily  Therapy to evaluate and treat      (G25.2) Resting tremor  Comment: Chronic.  Plan: Follow with neurology    (E87.8) Electrolyte abnormality  Comment: Acute.  Currently taking potassium  Plan: Discontinue potassium as it is difficult to swallow  Repeat BMP      (E21.3) Hyperparathyroidism (H24)  Comment: Status post parathyroidectomy with Dr. 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  Currently taking lexapro  Plan: Continue with plan of care no changes at this time, adjustment as needed          Total time spent with patient visit at the skilled nursing facility was 45 min including patient visit, review of past records, and [facility] record.     Electronically signed by:      ROXANNE Stafford CNP on 3/18/2024 at 6:46 PM                     Sincerely,        ROXANNE Stafford CNP

## 2024-03-18 NOTE — PLAN OF CARE
Physical Therapy Discharge Summary    Reason for therapy discharge:    Discharged to transitional care facility.    Progress towards therapy goal(s). See goals on Care Plan in [facility] electronic health record for goal details.  Goals partially met.  Barriers to achieving goals:   discharge from facility.    Therapy recommendation(s):    Continued therapy is recommended.  Rationale/Recommendations:  TCU to improve IND with mobility as pt below baseline at this time.

## 2024-03-19 ENCOUNTER — LAB REQUISITION (OUTPATIENT)
Dept: LAB | Facility: CLINIC | Age: 79
End: 2024-03-19
Payer: MEDICARE

## 2024-03-19 DIAGNOSIS — T79.6XXA: ICD-10-CM

## 2024-03-20 LAB
ANION GAP SERPL CALCULATED.3IONS-SCNC: 10 MMOL/L (ref 7–15)
BUN SERPL-MCNC: 18.2 MG/DL (ref 8–23)
CALCIUM SERPL-MCNC: 8.5 MG/DL (ref 8.8–10.2)
CHLORIDE SERPL-SCNC: 103 MMOL/L (ref 98–107)
CREAT SERPL-MCNC: 0.82 MG/DL (ref 0.51–0.95)
DEPRECATED HCO3 PLAS-SCNC: 28 MMOL/L (ref 22–29)
EGFRCR SERPLBLD CKD-EPI 2021: 73 ML/MIN/1.73M2
ERYTHROCYTE [DISTWIDTH] IN BLOOD BY AUTOMATED COUNT: 14.2 % (ref 10–15)
GLUCOSE SERPL-MCNC: 71 MG/DL (ref 70–99)
HCT VFR BLD AUTO: 32.5 % (ref 35–47)
HGB BLD-MCNC: 10.7 G/DL (ref 11.7–15.7)
MCH RBC QN AUTO: 28.6 PG (ref 26.5–33)
MCHC RBC AUTO-ENTMCNC: 32.9 G/DL (ref 31.5–36.5)
MCV RBC AUTO: 87 FL (ref 78–100)
PLATELET # BLD AUTO: 450 10E3/UL (ref 150–450)
POTASSIUM SERPL-SCNC: 3.9 MMOL/L (ref 3.4–5.3)
RBC # BLD AUTO: 3.74 10E6/UL (ref 3.8–5.2)
SODIUM SERPL-SCNC: 141 MMOL/L (ref 135–145)
WBC # BLD AUTO: 7 10E3/UL (ref 4–11)

## 2024-03-20 PROCEDURE — 36415 COLL VENOUS BLD VENIPUNCTURE: CPT | Performed by: NURSE PRACTITIONER

## 2024-03-20 PROCEDURE — 85027 COMPLETE CBC AUTOMATED: CPT | Performed by: NURSE PRACTITIONER

## 2024-03-20 PROCEDURE — 80048 BASIC METABOLIC PNL TOTAL CA: CPT | Performed by: NURSE PRACTITIONER

## 2024-03-20 PROCEDURE — P9604 ONE-WAY ALLOW PRORATED TRIP: HCPCS | Performed by: NURSE PRACTITIONER

## 2024-03-21 ENCOUNTER — TRANSITIONAL CARE UNIT VISIT (OUTPATIENT)
Dept: GERIATRICS | Facility: CLINIC | Age: 79
End: 2024-03-21
Payer: MEDICARE

## 2024-03-21 VITALS
DIASTOLIC BLOOD PRESSURE: 80 MMHG | HEART RATE: 85 BPM | OXYGEN SATURATION: 95 % | BODY MASS INDEX: 17.89 KG/M2 | RESPIRATION RATE: 14 BRPM | SYSTOLIC BLOOD PRESSURE: 144 MMHG | TEMPERATURE: 97.7 F | WEIGHT: 97.2 LBS | HEIGHT: 62 IN

## 2024-03-21 DIAGNOSIS — S32.040S CLOSED COMPRESSION FRACTURE OF L4 LUMBAR VERTEBRA, SEQUELA: ICD-10-CM

## 2024-03-21 DIAGNOSIS — G25.2 RESTING TREMOR: ICD-10-CM

## 2024-03-21 DIAGNOSIS — S22.42XD CLOSED FRACTURE OF MULTIPLE RIBS OF LEFT SIDE WITH ROUTINE HEALING, SUBSEQUENT ENCOUNTER: Primary | ICD-10-CM
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
## 2024-03-21 DIAGNOSIS — R53.81 PHYSICAL DECONDITIONING: ICD-10-CM

## 2024-03-21 DIAGNOSIS — F32.5 DEPRESSION, MAJOR, IN REMISSION (H): ICD-10-CM

## 2024-03-21 PROCEDURE — 99305 1ST NF CARE MODERATE MDM 35: CPT | Performed by: INTERNAL MEDICINE

## 2024-03-21 NOTE — LETTER
3/21/2024        RE: Dorothea Dugan  [address]  [place] [address]        [facility] GERIATRICS  INITIAL VISIT NOTE  March 21, 2024      PRIMARY CARE PROVIDER AND CLINIC:  Miryam Dnag [address] / [place] [address]    [facility] Medical Record Number:  [id number]  Place of Service where encounter took place:  [facility] (TCU) [[id number]]    Chief Complaint   Patient presents with     Hospital F/U       HPI:    Dorothea Dugan is a 78 year old  (1945) female seen today at [facility] TCU.  Medical history is notable for depression/anxiety, hip fracture and hyperparathyroidism. She was hospitalized at [facility] from 3/11/24 to 3/15/24 where she presented after a fall and was down for hours. Labs with rhabdomyolysis and BREN which improved with IV fluids. Imaging with age indeterminate L4 compression fracture and multiple left rib fractures. Also noted to have a tremor and recommendation for outpatient neurology consultation. She was admitted to this facility for  rehab, medical management, and nursing care.      History obtained from: facility chart records, facility staff, patient report, [facility] chart review, and Care [facility] chart review.      Today, Ms. Dugan is seen in her room sitting in a wheelchair after breakfast. She was awaiting group OT at 1045. Overall feels she is getting stronger. Notes she needs to see neurology about the tremor in her RUE. Tried to understand how long it has been present, if it's improved with caffeine or if it changes as she goes to  something with her R hand. Also noted that her R ankle seems to turn inward a bit - she was not aware of that and thought it was because she didn't want to bump the table? No chest pain. No trouble breathing. She sees podiatry today in house. No acute concerns today per discussion with nursing. She is working with therapies.     CODE STATUS: DNR /  DNI    ALLERGIES:  Allergies   Allergen Reactions     Strawberry Extract      Pcn [Penicillins] Rash       PAST MEDICAL HISTORY:   Past Medical History:   Diagnosis Date     Anxiety      Anxiety state, unspecified      Breast screening, unspecified      Fracture of head of femur (H)      Heart murmur      Insomnia, unspecified      Major depressive disorder, single episode, unspecified      Osteoporosis, unspecified      Parathyroid adenoma      Unspecified menopausal and postmenopausal disorder        PAST SURGICAL HISTORY:   Past Surgical History:   Procedure Laterality Date     ARTHROPLASTY HIP Right 2020    Procedure: RIGHT TOTAL HIP ARTHROPLASTY.;  Surgeon: Luis Felipe Christopher MD;  Location: [facility] OR     CLOSED RX PROX HUMERUS FRACTURE Right      ENT SURGERY      oral surgery 2014     GYN SURGERY           OPEN REDUCTION INTERNAL FIXATION ANKLE Right      PARATHYROIDECTOMY Right 2021    Procedure: RIGHT NECK EXPLORATION, EXCISION PARATHYROID ADENOMA;  Surgeon: Mario Alberto Enriquez MD;  Location: [facility] OR       FAMILY HISTORY:   Family History   Problem Relation Age of Onset     Other - See Comments Mother 95        Old age     Lymphoma Mother      Prostate Cancer Father        SOCIAL HISTORY:   Patient's living condition: lives with spouse    MEDICATIONS:  Post Discharge Medication Reconciliation Status: discharge medications reconciled and changed, per note/orders.  Current Outpatient Medications   Medication Sig Dispense Refill     acetaminophen (TYLENOL) 500 MG tablet Take 2 tablets (1,000 mg) by mouth 3 times daily       biotin 1000 MCG TABS tablet Take 500 mcg by mouth daily       escitalopram (LEXAPRO) 10 MG tablet Take 10 mg by mouth daily       Lidocaine (LIDOCARE) 4 % Patch Place 1 patch onto the skin every 24 hours To prevent lidocaine toxicity, patient should be patch free for 12 hrs daily.       Multiple Vitamin (MULTI VITAMIN DAILY PO) Take 1 tablet by mouth daily         "multivitamin w/minerals (THERA-VIT-M) tablet Take 1 tablet by mouth daily       senna-docusate (SENOKOT-S/PERICOLACE) 8.6-50 MG tablet Take 1 tablet by mouth 2 times daily as needed for constipation       Vitamin D, Cholecalciferol, 25 MCG (1000 UT) TABS Take 25 mcg by mouth daily         ROS:  4 point ROS neg other than the symptoms noted above in the HPI.    PHYSICAL EXAM:  BP (!) 144/80   Pulse 85   Temp 97.7  F (36.5  C)   Resp 14   Ht 1.562 m (5' 1.5\")   Wt 44.1 kg (97 lb 3.2 oz)   SpO2 95%   BMI 18.07 kg/m    Gen: sitting in wheelchair, alert, cooperative and in no acute distress  HEENT: good hearing acuity  Card: RRR, S1, S2, no murmurs  Resp: lungs clear to auscultation bilaterally, no crackles or wheezes; moving good air  Ext: no LE edema  Neuro: CX II-XII grossly in tact; ROM in all four extremities grossly in tact; tremor of RUE   Psych: alert and oriented to self and general situation     LABORATORY/IMAGING DATA:  Reviewed as per [facility] and/or [facility]    ASSESSMENT/PLAN:    Age Indeterminate L4 Compression Fracture   Left 7th-9th Rib Fractures  Secondary to a fall. No significant pain.   -- APAP 1000 mg TID, lido patch on q12h/of q12h  -- PT/OT  -- encourage good pulmonary toilet     RUE Tremor, Chronic  Difficult to understand chronicity today, doesn't seem like an intention tremor. Also note it seems her R ankle is rotated in a bit?   -- PT/OT  -- neurology as an outpatient    Mild Major Recurrent Depression  Mood and spirits were good today  -- escitalopram 10 mg daily     Slow Transit Constipation  -- Senna-S 1 tab BID PRN  -- adjust bowel regimen as needed    Mechanical Fall  Physical Deconditioning  In setting of hospitalization and underlying medical conditions  -- ongoing PT/OT      Electronically signed by:  Lula Lorenzana MD                          Sincerely,        Lula Lorenzana MD      "

## 2024-03-21 NOTE — PROGRESS NOTES
[facility] GERIATRICS  INITIAL VISIT NOTE  March 21, 2024      PRIMARY CARE PROVIDER AND CLINIC:  Miryam Dang [address] / [place] [address]    [facility] Medical Record Number:  [id number]  Place of Service where encounter took place:  [facility] (TCU) [[id number]]    Chief Complaint   Patient presents with    Hospital F/U       HPI:    Dorothea Dugan is a 78 year old  (1945[facility]  Medical history is notable for depression/anxiety, hip fracture and hyperparathyroidism. She was hospitalized at [facility] from 3/11/24 to 3/15/24 where she presented after a fall and was down for hours. Labs with rhabdomyolysis and BREN which improved with IV fluids. Imaging with age indeterminate L4 compression fracture and multiple left rib fractures. Also noted to have a tremor and recommendation for outpatient neurology consultation. She was admitted to this facility for  rehab, medical management, and nursing care.      History obtained from: facility chart records, facility staff, patient report, [facility] chart review, and Care [facility] chart review.      Today, Ms. Dugan is seen in her room sitting in a wheelchair after breakfast. She was awaiting group OT at 1045. Overall feels she is getting stronger. Notes she needs to see neurology about the tremor in her RUE. Tried to understand how long it has been present, if it's improved with caffeine or if it changes as she goes to  something with her R hand. Also noted that her R ankle seems to turn inward a bit - she was not aware of that and thought it was because she didn't want to bump the table? No chest pain. No trouble breathing. She sees podiatry today in house. No acute concerns today per discussion with nursing. She is working with therapies.     CODE STATUS: DNR / DNI    ALLERGIES:  Allergies   Allergen Reactions    Strawberry Extract     Pcn [Penicillins]  Rash       PAST MEDICAL HISTORY:   Past Medical History:   Diagnosis Date    Anxiety     Anxiety state, unspecified     Breast screening, unspecified     Fracture of head of femur (H)     Heart murmur     Insomnia, unspecified     Major depressive disorder, single episode, unspecified     Osteoporosis, unspecified     Parathyroid adenoma     Unspecified menopausal and postmenopausal disorder        PAST SURGICAL HISTORY:   Past Surgical History:   Procedure Laterality Date    ARTHROPLASTY HIP Right 2020    Procedure: RIGHT TOTAL HIP ARTHROPLASTY.;  Surgeon: Luis Felipe Christopher MD;  Location: [facility] OR    CLOSED RX PROX HUMERUS FRACTURE Right     ENT SURGERY      oral surgery 2014    GYN SURGERY          OPEN REDUCTION INTERNAL FIXATION ANKLE Right     PARATHYROIDECTOMY Right 2021    Procedure: RIGHT NECK EXPLORATION, EXCISION PARATHYROID ADENOMA;  Surgeon: Mario Alberto Enriquez MD;  Location: [facility] OR       FAMILY HISTORY:   Family History   Problem Relation Age of Onset    Other - See Comments Mother 95        Old age    Lymphoma Mother     Prostate Cancer Father        SOCIAL HISTORY:   Patient's living condition: lives with spouse    MEDICATIONS:  Post Discharge Medication Reconciliation Status: discharge medications reconciled and changed, per note/orders.  Current Outpatient Medications   Medication Sig Dispense Refill    acetaminophen (TYLENOL) 500 MG tablet Take 2 tablets (1,000 mg) by mouth 3 times daily      biotin 1000 MCG TABS tablet Take 500 mcg by mouth daily      escitalopram (LEXAPRO) 10 MG tablet Take 10 mg by mouth daily      Lidocaine (LIDOCARE) 4 % Patch Place 1 patch onto the skin every 24 hours To prevent lidocaine toxicity, patient should be patch free for 12 hrs daily.      Multiple Vitamin (MULTI VITAMIN DAILY PO) Take 1 tablet by mouth daily       multivitamin w/minerals (THERA-VIT-M) tablet Take 1 tablet by mouth daily      senna-docusate (SENOKOT-S/PERICOLACE) 8.6-50  "MG tablet Take 1 tablet by mouth 2 times daily as needed for constipation      Vitamin D, Cholecalciferol, 25 MCG (1000 UT) TABS Take 25 mcg by mouth daily         ROS:  4 point ROS neg other than the symptoms noted above in the HPI.    PHYSICAL EXAM:  BP (!) 144/80   Pulse 85   Temp 97.7  F (36.5  C)   Resp 14   Ht 1.562 m (5' 1.5\")   Wt 44.1 kg (97 lb 3.2 oz)   SpO2 95%   BMI 18.07 kg/m    Gen: sitting in wheelchair, alert, cooperative and in no acute distress  HEENT: good hearing acuity  Card: RRR, S1, S2, no murmurs  Resp: lungs clear to auscultation bilaterally, no crackles or wheezes; moving good air  Ext: no LE edema  Neuro: CX II-XII grossly in tact; ROM in all four extremities grossly in tact; tremor of RUE   Psych: alert and oriented to self and general situation     LABORATORY/IMAGING DATA:  Reviewed as per [facility] and/or [facility]    ASSESSMENT/PLAN:    Age Indeterminate L4 Compression Fracture   Left 7th-9th Rib Fractures  Secondary to a fall. No significant pain.   -- APAP 1000 mg TID, lido patch on q12h/of q12h  -- PT/OT  -- encourage good pulmonary toilet     RUE Tremor, Chronic  Difficult to understand chronicity today, doesn't seem like an intention tremor. Also note it seems her R ankle is rotated in a bit?   -- PT/OT  -- neurology as an outpatient    Mild Major Recurrent Depression  Mood and spirits were good today  -- escitalopram 10 mg daily     Slow Transit Constipation  -- Senna-S 1 tab BID PRN  -- adjust bowel regimen as needed    Mechanical Fall  Physical Deconditioning  In setting of hospitalization and underlying medical conditions  -- ongoing PT/OT      Electronically signed by:  Lula Lorenzana MD                      "

## 2024-03-29 ENCOUNTER — TRANSITIONAL CARE UNIT VISIT (OUTPATIENT)
Dept: GERIATRICS | Facility: CLINIC | Age: 79
End: 2024-03-29
Payer: MEDICARE

## 2024-03-29 VITALS
TEMPERATURE: 96.6 F | HEIGHT: 62 IN | DIASTOLIC BLOOD PRESSURE: 73 MMHG | WEIGHT: 99 LBS | OXYGEN SATURATION: 95 % | SYSTOLIC BLOOD PRESSURE: 129 MMHG | RESPIRATION RATE: 12 BRPM | HEART RATE: 95 BPM | BODY MASS INDEX: 18.22 KG/M2

## 2024-03-29 DIAGNOSIS — S22.42XD CLOSED FRACTURE OF MULTIPLE RIBS OF LEFT SIDE WITH ROUTINE HEALING, SUBSEQUENT ENCOUNTER: Primary | ICD-10-CM

## 2024-03-29 DIAGNOSIS — R54 FRAIL ELDERLY: ICD-10-CM

## 2024-03-29 DIAGNOSIS — J90 PLEURAL EFFUSION: ICD-10-CM
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
## 2024-03-29 DIAGNOSIS — S32.040S CLOSED COMPRESSION FRACTURE OF L4 LUMBAR VERTEBRA, SEQUELA: ICD-10-CM

## 2024-03-29 DIAGNOSIS — R53.81 PHYSICAL DECONDITIONING: ICD-10-CM

## 2024-03-29 PROCEDURE — 99308 SBSQ NF CARE LOW MDM 20: CPT

## 2024-03-29 NOTE — LETTER
"    3/29/2024        RE: Dorothea Dugan  [address] [facility] [address]        [facility] GERIATRICS    Chief Complaint   Patient presents with     RECHECK     HPI:  Dorothea Dugan is a 78 year old  (1945), who is being seen today for an episodic care visit at: [facility] (U) [[id number]]. Today's concern is: TCU Follow Up     Patient hospitalized 3/11/24-3/15/24 following a fall at home.  CT pelvis and head CT were negative for acute findings though x-ray revealed small left pleural effusion and L7-9 rib fractures.  Overall stabilized and pt discharged to the above TCU for rehab.    Today: Patient being seen for TCU follow-up visit. Found sitting up in chair watching TV.  Reports she is \" improving\". No pain unless someone pushes on her ribs. She is walking with therapy and tolerating well. Lives with spouse in [place] and plan is to return home when therapy goals are met. Eating and sleeping well.  Denies chest pain or shortness of breath. Staff without acute concerns.     Allergies, and PMH/PSH reviewed in [facility] today.  REVIEW OF SYSTEMS:  4 point ROS including Respiratory, CV, GI and , other than that noted in the HPI,  is negative  Objective:   /73   Pulse 95   Temp (!) 96.6  F (35.9  C)   Resp 12   Ht 1.562 m (5' 1.5\")   Wt 44.9 kg (99 lb)   SpO2 95%   BMI 18.40 kg/m      Exam:  GENERAL APPEARANCE: No acute distress, alert and awake  RESPIRATORY: Clear to auscultation, even and unlabored, symmetrical chest wall expansion  CARDIOVASCULAR: RRR, L ankle trace edema, S1/S2 normal  GASTROINTESTINAL: Soft, nontender, nondistended, bowel sounds present, thin   MUSCULOSKELETAL: Ambulating with therapy  NEUROLOGICAL: Alert and oriented  PSYCHOLOGICAL: Calm    Recent labs in [facility] reviewed by me today.     Assessment/Plan:  Fall  Multiple left ribs fracture  Closed compression fracture of L4 lumbar vertebra  Generalized weakness  Physical deconditioning  Frail elderly  -Pain " controlled, hemodynamically stable, ambulating with therapy  -Continue current care, continue therapies, fall precautions, safe disposition    Small left pleural effusion   -No chest pain, no shortness of breath, hemodynamically stable  -Continue current care, outpatient follow-up        MED REC REQUIRED  Post Medication Reconciliation Status: discharge medications reconciled, continue medications without change           Electronically signed by:   Zelalem Bowles DNP,ROXANNE,THUAN-BC.               The above note was completed in part using Dragon voice recognition software. Although reviewed after completion, some word and grammatical errors may occur. Please contact the author of this note with any questions.            Sincerely,        ROXANNE Keller CNP

## 2024-03-30 VITALS
HEIGHT: 62 IN | BODY MASS INDEX: 18.22 KG/M2 | RESPIRATION RATE: 18 BRPM | TEMPERATURE: 97.7 F | SYSTOLIC BLOOD PRESSURE: 140 MMHG | OXYGEN SATURATION: 96 % | DIASTOLIC BLOOD PRESSURE: 85 MMHG | HEART RATE: 80 BPM | WEIGHT: 99 LBS

## 2024-03-31 NOTE — PROGRESS NOTES
"[facility] GERIATRICS    Chief Complaint   Patient presents with    RECHECK     HPI:  Dorothea Dugan is a 78 year old  (1945), who is being seen today for an episodic care visit at: [facility] ([facility]) [[id number]].     Past medical history includes  Fall  Decreased mobility  Rhabdomyolysis  Generalized weakness  Mechanical fall  Left 7-9 rib fractures    L4 compression fracture, suspect chronic (no acute pain)  Hypokalemia  Hypomagnesemia  Normocytic anemia  Resting tremor  Onychomycosis   Hyperparathyroidism  History of right femoral neck hip fracture status post LAURA 2020  Anxiety  Depression        On 3/11/2022, patient presented after a fall at home.  She laid on the ground for several hours before her  found her.  Her CK level was elevated to 4331 she was given IV fluids.  A CT pelvis showed no acute fractures.  Patient was unable to ambulate in the emergency room after multiple attempts.  Head CT was negative for acute pathology.   X-ray showed small left pleural effusion and L7-9 rib fractures.    Today she states she is improving  VS stable.   Sitting in wheelchair and ready for lunch  Per nursing, Pt. requires max AO1 with bathing and toileting. Moderate AO1 with U/L body dressing and transfers with gait belt. Minimal AO1 with bed mobility, require setup assist with grooming, oral care and at mealtime. Pt. is continent of both bowel and bladder.  Per therapy : Pt takes 5 steps x3 w/ 2WW and CGA for improved functional gait.  SLUMS 28/30    Allergies, and PMH/PSH reviewed in [facility] today.  REVIEW OF SYSTEMS:  4 point ROS including Respiratory, CV, GI and , other than that noted in the HPI,  is negative    Objective:   BP (!) 140/85   Pulse 80   Temp 97.7  F (36.5  C)   Resp 18   Ht 1.562 m (5' 1.5\")   Wt 44.9 kg (99 lb)   SpO2 96%   BMI 18.40 kg/m    GENERAL APPEARANCE:  Alert, thin  RESP:  respiratory effort and palpation of chest normal, lungs clear to auscultation   CV:  " Palpation and auscultation of heart done , regular rate and rhythm, no murmur, rub, or gallop  PSYCH:  oriented X 3    Most Recent 3 CBC's:  Recent Labs   Lab Test 03/20/24  0617 03/15/24  1058 03/14/24  1047 03/13/24  1103 03/12/24  0834 03/11/24  1032   WBC 7.0  --   --   --  6.3 10.5   HGB 10.7* 12.3 10.4*   < > 11.6* 12.5   MCV 87  --   --   --  85 85     --   --   --  208 239    < > = values in this interval not displayed.     Most Recent 3 BMP's:  Recent Labs   Lab Test 03/20/24  0617 03/15/24  1058 03/14/24  2340 03/14/24  1657 03/14/24  1047    139  --   --  140   POTASSIUM 3.9 4.1 3.7   < > 3.3*  3.3*   CHLORIDE 103 106  --   --  106   CO2 28 21*  --   --  25   BUN 18.2 5.8*  --   --  5.4*   CR 0.82 0.63  --   --  0.70   ANIONGAP 10 12  --   --  9   TERENCE 8.5* 8.1*  --   --  7.9*   GLC 71 96  --   --  106*    < > = values in this interval not displayed.       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  Noted to have a chronic L4 compression fracture  And acute multiple rib fractures  For pain medication she has lidocaine patches, Tylenol,  Has bowel regimen  Pain controlled  Plan:          (G25.2) Resting tremor  Comment: Chronic.  Plan: Dr. Hurtado Neurology Date & Time of Appointment: 4/11/24 @ 11:00 AM Phone Number: [phone] Address: [address] [place] [address] Transportation: TBD  time: TBD      (E87.8) Electrolyte abnormality  Comment: Acute.  Potassium was stopped in TCU.  Last potassium 3.9 (3/20)  Plan:  Repeat  BMP       MED REC REQUIRED  Post Medication Reconciliation Status:  Discharge medications reconciled and changed, see notes/orders      Electronically signed by:     ROXANNE Stafford CNP on 4/1/2024 at 4:18 PM

## 2024-04-01 ENCOUNTER — TRANSITIONAL CARE UNIT VISIT (OUTPATIENT)
Dept: GERIATRICS | Facility: CLINIC | Age: 79
End: 2024-04-01
Payer: MEDICARE

## 2024-04-01 DIAGNOSIS — E87.8 ELECTROLYTE ABNORMALITY: ICD-10-CM

## 2024-04-01 DIAGNOSIS — G25.2 RESTING TREMOR: ICD-10-CM

## 2024-04-01 DIAGNOSIS — S22.42XD CLOSED FRACTURE OF MULTIPLE RIBS OF LEFT SIDE WITH ROUTINE HEALING, SUBSEQUENT ENCOUNTER: ICD-10-CM
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
## 2024-04-01 DIAGNOSIS — T79.6XXD TRAUMATIC RHABDOMYOLYSIS, SUBSEQUENT ENCOUNTER: ICD-10-CM

## 2024-04-01 DIAGNOSIS — Z99.89 WALKER AS AMBULATION AID: ICD-10-CM

## 2024-04-01 DIAGNOSIS — R53.81 PHYSICAL DECONDITIONING: ICD-10-CM

## 2024-04-01 DIAGNOSIS — R26.89 DECREASED MOBILITY: ICD-10-CM

## 2024-04-01 DIAGNOSIS — R53.1 GENERALIZED WEAKNESS: ICD-10-CM

## 2024-04-01 PROCEDURE — 99309 SBSQ NF CARE MODERATE MDM 30: CPT | Performed by: NURSE PRACTITIONER

## 2024-04-01 NOTE — PROGRESS NOTES
Dorothea Dugan  1945    Orders  BMP next lab day. Dx hypokalemia    Oralia Anne, APRN CNP on 4/1/2024 at 4:21 PM

## 2024-04-01 NOTE — LETTER
"    4/1/2024        RE: Dorothea Dugan  [address]  [place] [address]        [facility] GERIATRICS    Chief Complaint   Patient presents with     RECHECK     HPI:  Dorothea Dugan is a 78 year old  (1945), who is being seen today for an episodic care visit at: [facility] ([facility]) [[id number]].     Past medical history includes  Fall  Decreased mobility  Rhabdomyolysis  Generalized weakness  Mechanical fall  Left 7-9 rib fractures    L4 compression fracture, suspect chronic (no acute pain)  Hypokalemia  Hypomagnesemia  Normocytic anemia  Resting tremor  Onychomycosis   Hyperparathyroidism  History of right femoral neck hip fracture status post LAURA 2020  Anxiety  Depression        On 3/11/2022, patient presented after a fall at home.  She laid on the ground for several hours before her  found her.  Her CK level was elevated to 4331 she was given IV fluids.  A CT pelvis showed no acute fractures.  Patient was unable to ambulate in the emergency room after multiple attempts.  Head CT was negative for acute pathology.   X-ray showed small left pleural effusion and L7-9 rib fractures.    Today she states she is improving  VS stable.   Sitting in wheelchair and ready for lunch  Per nursing, Pt. requires max AO1 with bathing and toileting. Moderate AO1 with U/L body dressing and transfers with gait belt. Minimal AO1 with bed mobility, require setup assist with grooming, oral care and at mealtime. Pt. is continent of both bowel and bladder.  Per therapy : Pt takes 5 steps x3 w/ 2WW and CGA for improved functional gait.  SLUMS 28/30    Allergies, and PMH/PSH reviewed in [facility] today.  REVIEW OF SYSTEMS:  4 point ROS including Respiratory, CV, GI and , other than that noted in the HPI,  is negative    Objective:   BP (!) 140/85   Pulse 80   Temp 97.7  F (36.5  C)   Resp 18   Ht 1.562 m (5' 1.5\")   Wt 44.9 kg (99 lb)   SpO2 96%   BMI 18.40 kg/m    GENERAL APPEARANCE:  Alert, thin  RESP:  " respiratory effort and palpation of chest normal, lungs clear to auscultation   CV:  Palpation and auscultation of heart done , regular rate and rhythm, no murmur, rub, or gallop  PSYCH:  oriented X 3    Most Recent 3 CBC's:  Recent Labs   Lab Test 03/20/24  0617 03/15/24  1058 03/14/24  1047 03/13/24  1103 03/12/24  0834 03/11/24  1032   WBC 7.0  --   --   --  6.3 10.5   HGB 10.7* 12.3 10.4*   < > 11.6* 12.5   MCV 87  --   --   --  85 85     --   --   --  208 239    < > = values in this interval not displayed.     Most Recent 3 BMP's:  Recent Labs   Lab Test 03/20/24  0617 03/15/24  1058 03/14/24  2340 03/14/24  1657 03/14/24  1047    139  --   --  140   POTASSIUM 3.9 4.1 3.7   < > 3.3*  3.3*   CHLORIDE 103 106  --   --  106   CO2 28 21*  --   --  25   BUN 18.2 5.8*  --   --  5.4*   CR 0.82 0.63  --   --  0.70   ANIONGAP 10 12  --   --  9   TERENCE 8.5* 8.1*  --   --  7.9*   GLC 71 96  --   --  106*    < > = values in this interval not displayed.       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  Noted to have a chronic L4 compression fracture  And acute multiple rib fractures  For pain medication she has lidocaine patches, Tylenol,  Has bowel regimen  Pain controlled  Plan:          (G25.2) Resting tremor  Comment: Chronic.  Plan: Dr. Hurtado Neurology Date & Time of Appointment: 4/11/24 @ 11:00 AM Phone Number: [phone] Address: [address] [place] [address] Transportation: TBD  time: TBD      (E87.8) Electrolyte  abnormality  Comment: Acute.  Potassium was stopped in TCU.  Last potassium 3.9 (3/20)  Plan:  Repeat BMP       MED REC REQUIRED  Post Medication Reconciliation Status:  Discharge medications reconciled and changed, see notes/orders      Electronically signed by:     ROXANNE Stafford CNP on 4/1/2024 at 4:18 PM       Dorothea Dugan  1945    Orders  BMP next lab day. Dx hypokalemia    ROXANNE Stafford CNP on 4/1/2024 at 4:21 PM      Sincerely,        ROXANNE Stafford CNP

## 2024-04-02 ENCOUNTER — LAB REQUISITION (OUTPATIENT)
Dept: LAB | Facility: CLINIC | Age: 79
End: 2024-04-02
Payer: MEDICARE

## 2024-04-02 DIAGNOSIS — E87.6 HYPOKALEMIA: ICD-10-CM

## 2024-04-03 LAB
ANION GAP SERPL CALCULATED.3IONS-SCNC: 9 MMOL/L (ref 7–15)
BUN SERPL-MCNC: 21.2 MG/DL (ref 8–23)
CALCIUM SERPL-MCNC: 8.7 MG/DL (ref 8.8–10.2)
CHLORIDE SERPL-SCNC: 106 MMOL/L (ref 98–107)
CREAT SERPL-MCNC: 0.74 MG/DL (ref 0.51–0.95)
DEPRECATED HCO3 PLAS-SCNC: 27 MMOL/L (ref 22–29)
EGFRCR SERPLBLD CKD-EPI 2021: 83 ML/MIN/1.73M2
GLUCOSE SERPL-MCNC: 68 MG/DL (ref 70–99)
POTASSIUM SERPL-SCNC: 3.8 MMOL/L (ref 3.4–5.3)
SODIUM SERPL-SCNC: 142 MMOL/L (ref 135–145)

## 2024-04-03 PROCEDURE — 80048 BASIC METABOLIC PNL TOTAL CA: CPT | Performed by: NURSE PRACTITIONER

## 2024-04-03 PROCEDURE — P9604 ONE-WAY ALLOW PRORATED TRIP: HCPCS | Performed by: NURSE PRACTITIONER

## 2024-04-03 PROCEDURE — 36415 COLL VENOUS BLD VENIPUNCTURE: CPT | Performed by: NURSE PRACTITIONER

## 2024-04-09 ENCOUNTER — TRANSITIONAL CARE UNIT VISIT (OUTPATIENT)
Dept: GERIATRICS | Facility: CLINIC | Age: 79
End: 2024-04-09
Payer: MEDICARE

## 2024-04-09 VITALS
DIASTOLIC BLOOD PRESSURE: 74 MMHG | HEART RATE: 83 BPM | OXYGEN SATURATION: 96 % | TEMPERATURE: 97.9 F | HEIGHT: 62 IN | RESPIRATION RATE: 16 BRPM | SYSTOLIC BLOOD PRESSURE: 137 MMHG | WEIGHT: 101.2 LBS | BODY MASS INDEX: 18.62 KG/M2

## 2024-04-09 DIAGNOSIS — R53.1 GENERALIZED WEAKNESS: ICD-10-CM

## 2024-04-09 DIAGNOSIS — R26.89 DECREASED MOBILITY: ICD-10-CM

## 2024-04-09 DIAGNOSIS — R53.81 PHYSICAL DECONDITIONING: Primary | ICD-10-CM

## 2024-04-09 DIAGNOSIS — T79.6XXD TRAUMATIC RHABDOMYOLYSIS, SUBSEQUENT ENCOUNTER: ICD-10-CM

## 2024-04-09 DIAGNOSIS — E87.8 ELECTROLYTE ABNORMALITY: ICD-10-CM

## 2024-04-09 DIAGNOSIS — S22.42XD CLOSED FRACTURE OF MULTIPLE RIBS OF LEFT SIDE WITH ROUTINE HEALING, SUBSEQUENT ENCOUNTER: ICD-10-CM
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
## 2024-04-09 DIAGNOSIS — Z99.89 WALKER AS AMBULATION AID: ICD-10-CM

## 2024-04-09 DIAGNOSIS — G25.2 RESTING TREMOR: ICD-10-CM

## 2024-04-09 PROCEDURE — 99310 SBSQ NF CARE HIGH MDM 45: CPT | Performed by: NURSE PRACTITIONER

## 2024-04-09 NOTE — LETTER
"    4/9/2024        RE: Dorothea Dugan  [address]  [place] [address]        M [facility] GERIATRICS    Chief Complaint   Patient presents with     RECHECK     HPI:  Dorothea Dugan is a 78 year old  (1945), who is being seen today for an episodic care visit at: [facility] ([facility]) [[id number]].     Past medical history includes  Fall  Decreased mobility  Rhabdomyolysis  Generalized weakness  Mechanical fall  Left 7-9 rib fractures    L4 compression fracture, suspect chronic (no acute pain)  Hypokalemia  Hypomagnesemia  Normocytic anemia  Resting tremor  Onychomycosis   Hyperparathyroidism  History of right femoral neck hip fracture status post LAURA 2020  Anxiety  Depression        On 3/11/2022, patient presented after a fall at home.  She laid on the ground for several hours before her  found her.  Her CK level was elevated to 4331 she was given IV fluids.  A CT pelvis showed no acute fractures.  Patient was unable to ambulate in the emergency room after multiple attempts.  Head CT was negative for acute pathology.   X-ray showed small left pleural effusion and L7-9 rib fractures.    Today she is with her .  She has no complaints.    VS stable.   Per nursing,  Requires moderate AO1 with bathing toileting, U/L body dressing and transfers with gait belt. Minimal AO1 with bed mobility, require setup assist with grooming, oral care and at mealtime.Pt. is continent of both bowel and bladder.  Per therapy SLUMS 28/30    Allergies, and PMH/PSH reviewed in [facility] today.  REVIEW OF SYSTEMS:  4 point ROS including Respiratory, CV, GI and , other than that noted in the HPI,  is negative    Objective:   /74   Pulse 83   Temp 97.9  F (36.6  C)   Resp 16   Ht 1.562 m (5' 1.5\")   Wt 45.9 kg (101 lb 3.2 oz)   SpO2 96%   BMI 18.81 kg/m    GENERAL APPEARANCE:  Alert, thin  RESP:  respiratory effort and palpation of chest normal, lungs clear to auscultation   CV:  Palpation and " auscultation of heart done , regular rate and rhythm, no murmur, rub, or gallop  PSYCH:  oriented X 3    Most Recent 3 CBC's:  Recent Labs   Lab Test 03/20/24  0617 03/15/24  1058 03/14/24  1047 03/13/24  1103 03/12/24  0834 03/11/24  1032   WBC 7.0  --   --   --  6.3 10.5   HGB 10.7* 12.3 10.4*   < > 11.6* 12.5   MCV 87  --   --   --  85 85     --   --   --  208 239    < > = values in this interval not displayed.     Most Recent 3 BMP's:  Recent Labs   Lab Test 04/03/24  0556 03/20/24  0617 03/15/24  1058    141 139   POTASSIUM 3.8 3.9 4.1   CHLORIDE 106 103 106   CO2 27 28 21*   BUN 21.2 18.2 5.8*   CR 0.74 0.82 0.63   ANIONGAP 9 10 12   TERENCE 8.7* 8.5* 8.1*   GLC 68* 71 96       Assessment/Plan:    (R53.81) Physical deconditioning   (W19.XXXD) Fall, subsequent encounter  (R26.89) Decreased mobility  (Z99.89) Walker as ambulation aid  (T79.6XXD) Traumatic rhabdomyolysis, subsequent encounter  (M62.81) Generalized muscle weakness  (S22.42XD) Closed fracture of multiple ribs of left side with routine healing, subsequent encounter  (S32.040S) Closed compression fracture of L4 lumbar vertebra, sequela  (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  Noted to have a chronic L4 compression fracture  And acute multiple rib fractures  For pain medication she has lidocaine patches, Tylenol,  Has bowel regimen  Pain controlled  Plan:          (G25.2) Resting tremor  Comment: Chronic.  Plan:   She would like changed until after discharge - Dr. Hurtado Neurology Date & Time of Appointment: 4/11/24 @ 11:00 AM Phone Number: [phone] Address: [address] [place] [address] Transportation: TBD  time: TBD      (E87.8) Electrolyte abnormality  Comment: Acute.  Potassium was stopped in TCU.  Last potassium 3.9 (3/20)  I personally reviewed the BMP completed on 4/3/2024 and it was stable L   Plan:  Continue with plan of care no changes at this time, adjustment as  needed          MED REC REQUIRED  Post Medication Reconciliation Status:  Medication reconciliation previously completed during another office visit      Electronically signed by:     ROXANNE Stafford CNP on 4/10/2024 at 4:09 AM           Sincerely,        ROXANNE Stafford CNP

## 2024-04-09 NOTE — PROGRESS NOTES
"[facility] GERIATRICS    Chief Complaint   Patient presents with    RECHECK     HPI:  Dorothea Dugan is a 78 year old  (1945), who is being seen today for an episodic care visit at: [facility] ([facility]) [[id number]].     Past medical history includes  Fall  Decreased mobility  Rhabdomyolysis  Generalized weakness  Mechanical fall  Left 7-9 rib fractures    L4 compression fracture, suspect chronic (no acute pain)  Hypokalemia  Hypomagnesemia  Normocytic anemia  Resting tremor  Onychomycosis   Hyperparathyroidism  History of right femoral neck hip fracture status post LAURA 2020  Anxiety  Depression        On 3/11/2022, patient presented after a fall at home.  She laid on the ground for several hours before her  found her.  Her CK level was elevated to 4331 she was given IV fluids.  A CT pelvis showed no acute fractures.  Patient was unable to ambulate in the emergency room after multiple attempts.  Head CT was negative for acute pathology.   X-ray showed small left pleural effusion and L7-9 rib fractures.    Today she is with her .  She has no complaints.    VS stable.   Per nursing,  Requires moderate AO1 with bathing toileting, U/L body dressing and transfers with gait belt. Minimal AO1 with bed mobility, require setup assist with grooming, oral care and at mealtime.Pt. is continent of both bowel and bladder.  Per therapy SLUMS 28/30    Allergies, and PMH/PSH reviewed in [facility] today.  REVIEW OF SYSTEMS:  4 point ROS including Respiratory, CV, GI and , other than that noted in the HPI,  is negative    Objective:   /74   Pulse 83   Temp 97.9  F (36.6  C)   Resp 16   Ht 1.562 m (5' 1.5\")   Wt 45.9 kg (101 lb 3.2 oz)   SpO2 96%   BMI 18.81 kg/m    GENERAL APPEARANCE:  Alert, thin  RESP:  respiratory effort and palpation of chest normal, lungs clear to auscultation   CV:  Palpation and auscultation of heart done , regular rate and rhythm, no murmur, rub, or gallop  PSYCH:  " oriented X 3    Most Recent 3 CBC's:  Recent Labs   Lab Test 03/20/24  0617 03/15/24  1058 03/14/24  1047 03/13/24  1103 03/12/24  0834 03/11/24  1032   WBC 7.0  --   --   --  6.3 10.5   HGB 10.7* 12.3 10.4*   < > 11.6* 12.5   MCV 87  --   --   --  85 85     --   --   --  208 239    < > = values in this interval not displayed.     Most Recent 3 BMP's:  Recent Labs   Lab Test 04/03/24  0556 03/20/24  0617 03/15/24  1058    141 139   POTASSIUM 3.8 3.9 4.1   CHLORIDE 106 103 106   CO2 27 28 21*   BUN 21.2 18.2 5.8*   CR 0.74 0.82 0.63   ANIONGAP 9 10 12   TERENCE 8.7* 8.5* 8.1*   GLC 68* 71 96       Assessment/Plan:    (R53.81) Physical deconditioning   (W19.XXXD) Fall, subsequent encounter  (R26.89) Decreased mobility  (Z99.89) Walker as ambulation aid  (T79.6XXD) Traumatic rhabdomyolysis, subsequent encounter  (M62.81) Generalized muscle weakness  (S22.42XD) Closed fracture of multiple ribs of left side with routine healing, subsequent encounter  (S32.040S) Closed compression fracture of L4 lumbar vertebra, sequela  (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  Noted to have a chronic L4 compression fracture  And acute multiple rib fractures  For pain medication she has lidocaine patches, Tylenol,  Has bowel regimen  Pain controlled  Plan:          (G25.2) Resting tremor  Comment: Chronic.  Plan:   She would like changed until after discharge - Dr. Hurtado Neurology Date & Time of Appointment: 4/11/24 @ 11:00 AM Phone Number: [phone] Address: [address] [place] [address] Transportation: D  time: TBD      (E87.8) Electrolyte abnormality  Comment: Acute.  Potassium was stopped in TCU.  Last potassium 3.9 (3/20)  I personally reviewed the BMP completed on 4/3/2024 and it was stable L   Plan:  Continue with plan of care no changes at this time, adjustment as needed          MED REC REQUIRED  Post Medication Reconciliation Status:  Medication  reconciliation previously completed during another office visit      Electronically signed by:     ROXANNE Stafford CNP on 4/10/2024 at 4:09 AM    [facility] GERIATRICS  Face to Face and Medical Necessity Statement for DME Provider visit    Patient: Dorothea Dugan  Gender: female  YOB: 1945  [place] Medical Record Number: [id number]  Demographics:  [address]  [place] [address] [phone] (home)   Social Security Number: [id number]  Primary Care Provider: Miryam Dang  Insurance: Payor: MEDICARE / Plan: MEDICARE / Product Type: Medicare /     HPI: Dorothea Dugan is a 78 year old (1945), who is being seen today for a face to face provider visit at [facility] ([facility]) [[id number]]. Medical necessity statement for DME included.     This patient requires the following: DME Ordered and Medical Necessity Statement     Wheelchair Documentation  Size: standard 16 x 16.  Patient needs a yesica height wheelchair due to short stature and needs to place feet on the ground for propulsion  Corresponding cushion: Yes: standard  Standard foot rests: Yes standard  Elevating leg rests: No  Arm rests: Yes: standard  Lap tray: No  Dose the patient use oxygen? No   Is the patient able to propel wheelchair? Yes If no why not?  And is there someone who can?yes  1. The patient has mobility limitations that impairs their ability to participate in one or more mobility related activities: Toileting, Feeding, Grooming, and Bathing.  Patient needs a yesica height wheelchair due to short stature and needs to place feet on the ground for propulsion and this is suitable and necessary for use in the patient's home.  2. The patient's mobility limitations cannot be safely resolved by using a cane/walker: The pt's mobility cannot be resolved with a cane or walker     Reason why a cane or walker will not meet the patient's needs. (ie: balance, tolerance, level of assistance) weakness, rib  fractures, tremor  3. The patients home has adequate access to use a manual wheelchair:Yes  4. The use of a manual wheelchair on a regular basis will improve the patients ability to participate in mobility related ADL's at home:Yes  5. The patient is willing to use a manual wheelchair at home:Yes  6. The patient has adequate upper body strength and the mental capability to safely use a manual wheelchair and/or has a caregiver that is able to assist: Yes  7. Does the patient have a lower extremity injury or edema?No  Reason for Type of Wheelchair  Patient weight:101 lb      Dorothea Dugan has mobility limitations that impair her ability to participate in toileting, transferring, dressing, grooming, ambulation, and ADLs/MRADLs. Patient needs a yesica height wheelchair due to short stature and needs to place feet on the ground for propulsion and this is suitable and necessary for use in the patient's home, with adequate space and access in the home. The patient is able to propel the manual w/c, willing the use the manual w/c at home, and also has caregivers than can assist at her assisted living. The patient has adequate upper body strength and the mental capacity to safety use a manual w/c and has caregivers who can assist. Patient's mobility limitations cannot be resolved with a cane, walker, or other assistive device due to impaired strength, impaired balance, low functional activity tolerance, and high fall risk. The use of a manual yesica height wheelchair due to pt's short stature and needing to keep feet on group will be used on a regular basis will improve the patient's ability to participate in MRADLs in the home  Pt needing above DME with expected length of need of 99  years due to medical necessity associated with following diagnosis:     Physical deconditioning  Fall, subsequent encounter  Decreased mobility  Walker as ambulation aid  Traumatic rhabdomyolysis, subsequent encounter  Generalized weakness  Closed  "fracture of multiple ribs of left side with routine healing, subsequent encounter  Resting tremor  Electrolyte abnormality      Past Medical History:   has a past medical history of Anxiety, Anxiety state, unspecified, Breast screening, unspecified, Fracture of head of femur (H), Heart murmur, Insomnia, unspecified, Major depressive disorder, single episode, unspecified, Osteoporosis, unspecified, Parathyroid adenoma, and Unspecified menopausal and postmenopausal disorder.    She has no past medical history of PONV (postoperative nausea and vomiting).    Review of Systems:  See above    Exam:  Vitals: /74   Pulse 83   Temp 97.9  F (36.6  C)   Resp 16   Ht 1.562 m (5' 1.5\")   Wt 45.9 kg (101 lb 3.2 oz)   SpO2 96%   BMI 18.81 kg/m    BMI: Body mass index is 18.81 kg/m .  See above     Assessment/Plan:  1. Physical deconditioning    2. Fall, subsequent encounter    3. Decreased mobility    4. Walker as ambulation aid    5. Traumatic rhabdomyolysis, subsequent encounter    6. Generalized weakness    7. Closed fracture of multiple ribs of left side with routine healing, subsequent encounter    8. Resting tremor    9. Electrolyte abnormality        Orders:  1. Facility staff/TC to contact DME company to get their order form for provider to fill out    ELECTRONICALLY SIGNED BY TAMAR CERTIFIED PROVIDER: ROXANNE Stafford CNP   NPI: [id number]  [facility]  [address] [place] [address]   "

## 2024-04-16 ENCOUNTER — DISCHARGE SUMMARY NURSING HOME (OUTPATIENT)
Dept: GERIATRICS | Facility: CLINIC | Age: 79
End: 2024-04-16
Payer: MEDICARE

## 2024-04-16 VITALS
TEMPERATURE: 97.7 F | WEIGHT: 101.4 LBS | RESPIRATION RATE: 16 BRPM | BODY MASS INDEX: 18.66 KG/M2 | HEART RATE: 81 BPM | DIASTOLIC BLOOD PRESSURE: 66 MMHG | OXYGEN SATURATION: 99 % | SYSTOLIC BLOOD PRESSURE: 112 MMHG | HEIGHT: 62 IN

## 2024-04-16 DIAGNOSIS — G25.2 RESTING TREMOR: ICD-10-CM

## 2024-04-16 DIAGNOSIS — R26.89 DECREASED MOBILITY: ICD-10-CM

## 2024-04-16 DIAGNOSIS — R53.81 PHYSICAL DECONDITIONING: ICD-10-CM

## 2024-04-16 DIAGNOSIS — R53.1 GENERALIZED WEAKNESS: ICD-10-CM

## 2024-04-16 DIAGNOSIS — F32.5 DEPRESSION, MAJOR, IN REMISSION (H): ICD-10-CM

## 2024-04-16 DIAGNOSIS — S22.42XD CLOSED FRACTURE OF MULTIPLE RIBS OF LEFT SIDE WITH ROUTINE HEALING, SUBSEQUENT ENCOUNTER: ICD-10-CM

## 2024-04-16 DIAGNOSIS — Z99.89 WALKER AS AMBULATION AID: ICD-10-CM
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
## 2024-04-16 DIAGNOSIS — S32.040S CLOSED COMPRESSION FRACTURE OF L4 LUMBAR VERTEBRA, SEQUELA: ICD-10-CM

## 2024-04-16 DIAGNOSIS — T79.6XXD TRAUMATIC RHABDOMYOLYSIS, SUBSEQUENT ENCOUNTER: ICD-10-CM

## 2024-04-16 PROCEDURE — 99316 NF DSCHRG MGMT 30 MIN+: CPT | Performed by: NURSE PRACTITIONER

## 2024-04-16 NOTE — LETTER
4/16/2024        RE: Dorothea Dugan  [address]  [place] [address]        [facility] GERIATRICS DISCHARGE SUMMARY  PATIENT'S NAME: Dorothea Dugan  YOB: 1945  MEDICAL RECORD NUMBER:  [id number]  Place of Service where encounter took place:  [facility] ([facility]) [[id number]]    PRIMARY CARE PROVIDER AND CLINIC RESPONSIBLE AFTER TRANSFER:   Miryam Dang, , [address] / [place] [address]    [facility]     Transferring providers: Oralia OLIVEIRA CNP; Darlin Alcantara MD  Recent Hospitalization/ED:  [facility] Hospital stay 3/11/24 to 3/15/24.  Date of SNF Admission: March 15, 2024  Date of SNF (anticipated) Discharge: 4/21/2024  Discharged to: previous independent home  Cognitive Scores: SLUMS: 28/30  Physical Function: per nursing, Requires maximum assist of one with bathing. Moderate AO1 with upper and lower body dressing, transfer, toileting. Set up with eating. SBA with grooming, oral cares. Minimum assist of one with bed mobility. PT only with ambulation using black 2ww  DME: Wheelchair    CODE STATUS/ADVANCE DIRECTIVES DISCUSSION:  No CPR- Do NOT Intubate   ALLERGIES: Strawberry extract and Pcn [penicillins]    Past medical history includes  Fall  Decreased mobility  Rhabdomyolysis  Generalized weakness  Mechanical fall  Left 7-9 rib fractures    L4 compression fracture, suspect chronic (no acute pain)  Hypokalemia  Hypomagnesemia  Normocytic anemia  Resting tremor  Onychomycosis   Hyperparathyroidism  History of right femoral neck hip fracture status post LAURA 2020  Anxiety  Depression        On 3/11/2022, patient presented after a fall at home.  She laid on the ground for several hours before her  found her.  Her CK level was elevated to 4331 she was given IV fluids.  A CT pelvis showed no acute fractures.  Patient was unable to ambulate in the emergency room after multiple attempts.  Head CT was negative for acute pathology.    X-ray showed small left pleural effusion and L7-9 rib fractures.       NURSING FACILITY COURSE   Medication Changes/Rationale:   Summary of nursing facility stay:     (R53.81) Physical deconditioning   (W19.XXXD) Fall, subsequent encounter  (R26.89) Decreased mobility  (Z99.89) Walker as ambulation aid  (T79.6XXD) Traumatic rhabdomyolysis, subsequent encounter  (M62.81) Generalized muscle weakness  (S22.42XD) Closed fracture of multiple ribs of left side with routine healing, subsequent encounter  (S32.040S) Closed compression fracture of L4 lumbar vertebra, sequela  Comment: Patient had a fall at home.  Noted to have a chronic L4 compression fracture  acute multiple rib fractures  For pain medication she has lidocaine patches, Tylenol,  Per therapy : Pt amb 11-14 ft x3 w/ 2WW and CGA for improved functional gait. WC follow as well as pt needs multiple   breaks. C          (G25.2) Resting tremor  Comment: Chronic.  Follow with neurology     (E87.8) Electrolyte abnormality  Comment: Acute.  Was taking potassium but was discontinued due to swallowing difficulty  Potassium on 4/3/2024 was 3.8        (E21.3) Hyperparathyroidism (H24)  Comment: Status post parathyroidectomy with Dr. Che             (F41.9,  F32.A) Anxiety and depression  (F32.5) Depression, major, in remission (H24)  Comment: Chronic.  Currently taking lexapro         Discharge Medications:  MED REC REQUIRED  Post Medication Reconciliation Status:  Discharge medications reconciled and changed, see notes/orders       Current Outpatient Medications   Medication Sig Dispense Refill     acetaminophen (TYLENOL) 500 MG tablet Take 2 tablets (1,000 mg) by mouth 3 times daily       biotin 1000 MCG TABS tablet Take 500 mcg by mouth daily       escitalopram (LEXAPRO) 10 MG tablet Take 10 mg by mouth daily       Lidocaine (LIDOCARE) 4 % Patch Place 1 patch onto the skin every 24 hours To prevent lidocaine toxicity, patient should be patch free for 12 hrs  "daily.       Multiple Vitamin (MULTI VITAMIN DAILY PO) Take 1 tablet by mouth daily        senna-docusate (SENOKOT-S/PERICOLACE) 8.6-50 MG tablet Take 1 tablet by mouth 2 times daily as needed for constipation       Vitamin D, Cholecalciferol, 25 MCG (1000 UT) TABS Take 25 mcg by mouth daily            Controlled medications:   not applicable/none     Past Medical History:   Past Medical History:   Diagnosis Date     Anxiety      Anxiety state, unspecified      Breast screening, unspecified      Fracture of head of femur (H)      Heart murmur      Insomnia, unspecified      Major depressive disorder, single episode, unspecified      Osteoporosis, unspecified      Parathyroid adenoma      Unspecified menopausal and postmenopausal disorder      Physical Exam:   Vitals: /66   Pulse 81   Temp 97.7  F (36.5  C)   Resp 16   Ht 1.562 m (5' 1.5\")   Wt 46 kg (101 lb 6.4 oz)   SpO2 99%   BMI 18.85 kg/m    BMI: Body mass index is 18.85 kg/m .  GENERAL APPEARANCE:  Alert, in no distress, thin  RESP:  lungs clear to auscultation , no respiratory distress  CV:  Palpation and auscultation of heart done , rate-normal  PSYCH:  oriented X 3     SNF labs: Most Recent 3 CBC's:  Recent Labs   Lab Test 03/20/24  0617 03/15/24  1058 03/14/24  1047 03/13/24  1103 03/12/24  0834 03/11/24  1032   WBC 7.0  --   --   --  6.3 10.5   HGB 10.7* 12.3 10.4*   < > 11.6* 12.5   MCV 87  --   --   --  85 85     --   --   --  208 239    < > = values in this interval not displayed.     Most Recent 3 BMP's:  Recent Labs   Lab Test 04/03/24  0556 03/20/24  0617 03/15/24  1058    141 139   POTASSIUM 3.8 3.9 4.1   CHLORIDE 106 103 106   CO2 27 28 21*   BUN 21.2 18.2 5.8*   CR 0.74 0.82 0.63   ANIONGAP 9 10 12   TERENCE 8.7* 8.5* 8.1*   GLC 68* 71 96       DISCHARGE PLAN:  Follow up labs: BMP and CBC due 1-2 weeks to be drawn by home care with results to PCP  Medical Follow Up:     Follow up with primary care provider in 1-2 weeks  Dr." Bryant Neurology Date & Time of Appointment: 4/23/24 @ 11:00 AM Phone Number: [phone] Address: [address] [place], [place]  Dr. Brown-Podiatry Date & Time of Appointment: 4/24/24 @ 12:05 PM Phone Number: [phone] Address: [address] [facility] 4th floor [facility] scheduled appointments:  Appointments in Next Year      Apr 24, 2024 12:20 PM  (Arrive by 12:05 PM)  NEW VISIT with Luke Sky DPM  [facility] ([facility] ) [phone]           Discharge Services: Home Care:  Occupational Therapy, Physical Therapy, and Home Health Aide  Discharge Instructions Verbalized to Patient at Discharge:   None    TOTAL DISCHARGE TIME:   Greater than 30 minutes  Electronically signed by:      Oralia Anne, ROXANNE CNP on 4/16/2024 at 9:25 PM      Documentation of Face to Face and Certification for Home Health Services    I certify that patient: Dorothea Dugan is under my care and that I, or a nurse practitioner or physician's assistant working with me, had a face-to-face encounter that meets the physician face-to-face encounter requirements with this patient on: 4/16/2024.    This encounter with the patient was in whole, or in part, for the following medical condition, which is the primary reason for home health care:        Physical deconditioning  Fall, subsequent encounter  Decreased mobility  Walker as ambulation aid  Traumatic rhabdomyolysis, subsequent encounter  Generalized weakness  Closed fracture of multiple ribs of left side with routine healing, subsequent encounter  Closed compression fracture of L4 lumbar vertebra, sequela  Depression, major, in remission (H24)  Resting tremor  .    I certify that, based on my findings, the following services are medically necessary home health services: Occupational Therapy and Physical Therapy.    My clinical findings support the need for the above services  because: Occupational Therapy Services are needed to assess and treat cognitive ability and address ADL safety due to impairment in mobility and generalized weakness. and Physical Therapy Services are needed to assess and treat the following functional impairments: mobility and generalized weakness.    Further, I certify that my clinical findings support that this patient is homebound (i.e. absences from home require considerable and taxing effort and are for medical reasons or Judaism services or infrequently or of short duration when for other reasons) because: Requires assistance of another person or specialized equipment to access medical services because patient: Requires supervision of another for safe transfer...    Based on the above findings. I certify that this patient is confined to the home and needs intermittent skilled nursing care, physical therapy and/or speech therapy.  The patient is under my care, and I have initiated the establishment of the plan of care.  This patient will be followed by a physician who will periodically review the plan of care.  Physician/Provider to provide follow up care: Miryam Dang    Attending hospital physician (the Medicare certified PECOS provider): ROXANNE Stafford CNP  Physician Signature: See electronic signature associated with these discharge orders.  Date: 4/16/2024               Sincerely,        ROXANNE Stafford CNP

## 2024-04-16 NOTE — PROGRESS NOTES
[facility] GERIATRICS DISCHARGE SUMMARY  PATIENT'S NAME: Dorothea Dugan  YOB: 1945  MEDICAL RECORD NUMBER:  [id number]  Place of Service where encounter took place:  [facility] (TCU) [[id number]]    PRIMARY CARE PROVIDER AND CLINIC RESPONSIBLE AFTER TRANSFER:   Miryam Dang DO, [address] / [place] [address]    G Provider     Transferring providers: Oralia OLIVEIRA CNP; Darlin Alcantara MD  Recent Hospitalization/ED:  [facility] Hospital stay 3/11/24 to 3/15/24.  Date of SNF Admission: March 15, 2024  Date of SNF (anticipated) Discharge: 4/21/2024  Discharged to: previous independent home  Cognitive Scores: SLUMS: 28/30  Physical Function: per nursing, Requires maximum assist of one with bathing. Moderate AO1 with upper and lower body dressing, transfer, toileting. Set up with eating. SBA with grooming, oral cares. Minimum assist of one with bed mobility. PT only with ambulation using black 2ww  DME: Wheelchair    CODE STATUS/ADVANCE DIRECTIVES DISCUSSION:  No CPR- Do NOT Intubate   ALLERGIES: Strawberry extract and Pcn [penicillins]    Past medical history includes  Fall  Decreased mobility  Rhabdomyolysis  Generalized weakness  Mechanical fall  Left 7-9 rib fractures    L4 compression fracture, suspect chronic (no acute pain)  Hypokalemia  Hypomagnesemia  Normocytic anemia  Resting tremor  Onychomycosis   Hyperparathyroidism  History of right femoral neck hip fracture status post LAURA 2020  Anxiety  Depression        On 3/11/2022, patient presented after a fall at home.  She laid on the ground for several hours before her  found her.  Her CK level was elevated to 4331 she was given IV fluids.  A CT pelvis showed no acute fractures.  Patient was unable to ambulate in the emergency room after multiple attempts.  Head CT was negative for acute pathology.   X-ray showed small left pleural effusion and L7-9 rib fractures.       NURSING  FACILITY COURSE   Medication Changes/Rationale:   Summary of nursing facility stay:     (R53.81) Physical deconditioning   (W19.XXXD) Fall, subsequent encounter  (R26.89) Decreased mobility  (Z99.89) Walker as ambulation aid  (T79.6XXD) Traumatic rhabdomyolysis, subsequent encounter  (M62.81) Generalized muscle weakness  (S22.42XD) Closed fracture of multiple ribs of left side with routine healing, subsequent encounter  (S32.040S) Closed compression fracture of L4 lumbar vertebra, sequela  Comment: Patient had a fall at home.  Noted to have a chronic L4 compression fracture  acute multiple rib fractures  For pain medication she has lidocaine patches, Tylenol,  Per therapy : Pt amb 11-14 ft x3 w/ 2WW and CGA for improved functional gait. WC follow as well as pt needs multiple   breaks. C          (G25.2) Resting tremor  Comment: Chronic.  Follow with neurology     (E87.8) Electrolyte abnormality  Comment: Acute.  Was taking potassium but was discontinued due to swallowing difficulty  Potassium on 4/3/2024 was 3.8        (E21.3) Hyperparathyroidism (H24)  Comment: Status post parathyroidectomy with Dr. Che             (F41.9,  F32.A) Anxiety and depression  (F32.5) Depression, major, in remission (H24)  Comment: Chronic.  Currently taking lexapro         Discharge Medications:  MED REC REQUIRED  Post Medication Reconciliation Status:  Discharge medications reconciled and changed, see notes/orders       Current Outpatient Medications   Medication Sig Dispense Refill    acetaminophen (TYLENOL) 500 MG tablet Take 2 tablets (1,000 mg) by mouth 3 times daily      biotin 1000 MCG TABS tablet Take 500 mcg by mouth daily      escitalopram (LEXAPRO) 10 MG tablet Take 10 mg by mouth daily      Lidocaine (LIDOCARE) 4 % Patch Place 1 patch onto the skin every 24 hours To prevent lidocaine toxicity, patient should be patch free for 12 hrs daily.      Multiple Vitamin (MULTI VITAMIN DAILY PO) Take 1 tablet by mouth daily        "senna-docusate (SENOKOT-S/PERICOLACE) 8.6-50 MG tablet Take 1 tablet by mouth 2 times daily as needed for constipation      Vitamin D, Cholecalciferol, 25 MCG (1000 UT) TABS Take 25 mcg by mouth daily            Controlled medications:   not applicable/none     Past Medical History:   Past Medical History:   Diagnosis Date    Anxiety     Anxiety state, unspecified     Breast screening, unspecified     Fracture of head of femur (H)     Heart murmur     Insomnia, unspecified     Major depressive disorder, single episode, unspecified     Osteoporosis, unspecified     Parathyroid adenoma     Unspecified menopausal and postmenopausal disorder      Physical Exam:   Vitals: /66   Pulse 81   Temp 97.7  F (36.5  C)   Resp 16   Ht 1.562 m (5' 1.5\")   Wt 46 kg (101 lb 6.4 oz)   SpO2 99%   BMI 18.85 kg/m    BMI: Body mass index is 18.85 kg/m .  GENERAL APPEARANCE:  Alert, in no distress, thin  RESP:  lungs clear to auscultation , no respiratory distress  CV:  Palpation and auscultation of heart done , rate-normal  PSYCH:  oriented X 3     SNF labs: Most Recent 3 CBC's:  Recent Labs   Lab Test 03/20/24  0617 03/15/24  1058 03/14/24  1047 03/13/24  1103 03/12/24  0834 03/11/24  1032   WBC 7.0  --   --   --  6.3 10.5   HGB 10.7* 12.3 10.4*   < > 11.6* 12.5   MCV 87  --   --   --  85 85     --   --   --  208 239    < > = values in this interval not displayed.     Most Recent 3 BMP's:  Recent Labs   Lab Test 04/03/24  0556 03/20/24  0617 03/15/24  1058    141 139   POTASSIUM 3.8 3.9 4.1   CHLORIDE 106 103 106   CO2 27 28 21*   BUN 21.2 18.2 5.8*   CR 0.74 0.82 0.63   ANIONGAP 9 10 12   TERENCE 8.7* 8.5* 8.1*   GLC 68* 71 96       DISCHARGE PLAN:  Follow up labs: BMP and CBC due 1-2 weeks to be drawn by home care with results to PCP  Medical Follow Up:     Follow up with primary care provider in 1-2 weeks  Dr. Hurtado Neurology Date & Time of Appointment: 4/23/24 @ 11:00 AM Phone Number: [phone] Address: [facility] [address] [place], [place]  Dr. Brown-Podiatry Date & Time of Appointment: 4/24/24 @ 12:05 PM Phone Number: [phone] Address: [address] [place] [address] 4th floor [facility] scheduled appointments:  Appointments in Next Year      Apr 24, 2024 12:20 PM  (Arrive by 12:05 PM)  NEW VISIT with Luke Sky DPM  [facility] ([facility] ) [phone]           Discharge Services: Home Care:  Occupational Therapy, Physical Therapy, and Home Health Aide  Discharge Instructions Verbalized to Patient at Discharge:   None    TOTAL DISCHARGE TIME:   Greater than 30 minutes  Electronically signed by:      ROXANNE Stafford CNP on 4/16/2024 at 9:25 PM      Documentation of Face to Face and Certification for Home Health Services    I certify that patient: Dorothea Dugan is under my care and that I, or a nurse practitioner or physician's assistant working with me, had a face-to-face encounter that meets the physician face-to-face encounter requirements with this patient on: 4/16/2024.    This encounter with the patient was in whole, or in part, for the following medical condition, which is the primary reason for home health care:        Physical deconditioning  Fall, subsequent encounter  Decreased mobility  Walker as ambulation aid  Traumatic rhabdomyolysis, subsequent encounter  Generalized weakness  Closed fracture of multiple ribs of left side with routine healing, subsequent encounter  Closed compression fracture of L4 lumbar vertebra, sequela  Depression, major, in remission (H24)  Resting tremor  .    I certify that, based on my findings, the following services are medically necessary home health services: Occupational Therapy and Physical Therapy.    My clinical findings support the need for the above services because: Occupational Therapy Services are needed to assess and treat cognitive ability and address ADL  safety due to impairment in mobility and generalized weakness. and Physical Therapy Services are needed to assess and treat the following functional impairments: mobility and generalized weakness.    Further, I certify that my clinical findings support that this patient is homebound (i.e. absences from home require considerable and taxing effort and are for medical reasons or Taoism services or infrequently or of short duration when for other reasons) because: Requires assistance of another person or specialized equipment to access medical services because patient: Requires supervision of another for safe transfer...    Based on the above findings. I certify that this patient is confined to the home and needs intermittent skilled nursing care, physical therapy and/or speech therapy.  The patient is under my care, and I have initiated the establishment of the plan of care.  This patient will be followed by a physician who will periodically review the plan of care.  Physician/Provider to provide follow up care: Miryam Dang    Attending [facility] physician (the Medicare certified PEC[facility] provider): ROXANNE Stafford CNP  Physician Signature: See electronic signature associated with these discharge orders.  Date: 4/16/2024

## 2024-04-23 ENCOUNTER — TRANSITIONAL CARE UNIT VISIT (OUTPATIENT)
Dept: GERIATRICS | Facility: CLINIC | Age: 79
End: 2024-04-23
Payer: MEDICARE

## 2024-04-23 VITALS
WEIGHT: 104.2 LBS | DIASTOLIC BLOOD PRESSURE: 66 MMHG | TEMPERATURE: 97.7 F | HEART RATE: 80 BPM | OXYGEN SATURATION: 96 % | SYSTOLIC BLOOD PRESSURE: 119 MMHG | RESPIRATION RATE: 16 BRPM | BODY MASS INDEX: 19.17 KG/M2 | HEIGHT: 62 IN

## 2024-04-23 DIAGNOSIS — R53.81 PHYSICAL DECONDITIONING: Primary | ICD-10-CM

## 2024-04-23 DIAGNOSIS — R26.89 DECREASED MOBILITY: ICD-10-CM

## 2024-04-23 DIAGNOSIS — S32.040S CLOSED COMPRESSION FRACTURE OF L4 LUMBAR VERTEBRA, SEQUELA: ICD-10-CM
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
## 2024-04-23 DIAGNOSIS — G25.2 RESTING TREMOR: ICD-10-CM

## 2024-04-23 DIAGNOSIS — R53.1 GENERALIZED WEAKNESS: ICD-10-CM

## 2024-04-23 DIAGNOSIS — Z99.89 WALKER AS AMBULATION AID: ICD-10-CM

## 2024-04-23 DIAGNOSIS — T79.6XXD TRAUMATIC RHABDOMYOLYSIS, SUBSEQUENT ENCOUNTER: ICD-10-CM

## 2024-04-23 DIAGNOSIS — S22.42XD CLOSED FRACTURE OF MULTIPLE RIBS OF LEFT SIDE WITH ROUTINE HEALING, SUBSEQUENT ENCOUNTER: ICD-10-CM

## 2024-04-23 PROCEDURE — 99309 SBSQ NF CARE MODERATE MDM 30: CPT | Performed by: NURSE PRACTITIONER

## 2024-04-23 NOTE — PROGRESS NOTES
" [facility] GERIATRICS        Visit Type: RECHECK     Facility:   [facility] ([facility]) [[id number]]         History of Present Illness: Dorothea Dugan is a 78 year old female     Past medical history includes  Fall  Decreased mobility  Rhabdomyolysis  Generalized weakness  Mechanical fall  Left 7-9 rib fractures    L4 compression fracture, suspect chronic (no acute pain)  Hypokalemia  Hypomagnesemia  Normocytic anemia  Resting tremor  Onychomycosis   Hyperparathyroidism  History of right femoral neck hip fracture status post LAURA 2020  Anxiety  Depression    She is eating breakfast during the visit  Weight is up to 104#  VS stable.   No complaints.     Current Outpatient Medications   Medication Sig Dispense Refill    acetaminophen (TYLENOL) 500 MG tablet Take 2 tablets (1,000 mg) by mouth 3 times daily      biotin 1000 MCG TABS tablet Take 500 mcg by mouth daily      escitalopram (LEXAPRO) 10 MG tablet Take 10 mg by mouth daily      Lidocaine (LIDOCARE) 4 % Patch Place 1 patch onto the skin every 24 hours To prevent lidocaine toxicity, patient should be patch free for 12 hrs daily.      Multiple Vitamin (MULTI VITAMIN DAILY PO) Take 1 tablet by mouth daily       senna-docusate (SENOKOT-S/PERICOLACE) 8.6-50 MG tablet Take 1 tablet by mouth 2 times daily as needed for constipation      Vitamin D, Cholecalciferol, 25 MCG (1000 UT) TABS Take 25 mcg by mouth daily         ALLERGIES:Strawberry extract and Pcn [penicillins]    Vitals:  /66   Pulse 80   Temp 97.7  F (36.5  C)   Resp 16   Ht 1.562 m (5' 1.5\")   Wt 47.3 kg (104 lb 3.2 oz)   SpO2 96%   BMI 19.37 kg/m    Body mass index is 19.37 kg/m .    Review of Systems   All other systems reviewed and are negative.         Physical Exam  Vitals and nursing note reviewed.   Cardiovascular:      Rate and Rhythm: Normal rate.   Pulmonary:      Effort: Pulmonary effort is normal.      Breath sounds: Normal breath sounds.   Neurological:      Mental " Status: She is alert. Mental status is at baseline.   Psychiatric:         Attention and Perception: Attention normal.         Mood and Affect: Mood normal.         Speech: Speech normal.         Behavior: Behavior normal.         Thought Content: Thought content normal.         Cognition and Memory: Cognition normal.         Judgment: Judgment normal.           Labs:     Most Recent 3 CBC's:  Recent Labs   Lab Test 03/20/24  0617 03/15/24  1058 03/14/24  1047 03/13/24  1103 03/12/24  0834 03/11/24  1032   WBC 7.0  --   --   --  6.3 10.5   HGB 10.7* 12.3 10.4*   < > 11.6* 12.5   MCV 87  --   --   --  85 85     --   --   --  208 239    < > = values in this interval not displayed.     Most Recent 3 BMP's:  Recent Labs   Lab Test 04/03/24  0556 03/20/24  0617 03/15/24  1058    141 139   POTASSIUM 3.8 3.9 4.1   CHLORIDE 106 103 106   CO2 27 28 21*   BUN 21.2 18.2 5.8*   CR 0.74 0.82 0.63   ANIONGAP 9 10 12   TERENCE 8.7* 8.5* 8.1*   GLC 68* 71 96       Assessment/Plan:  (R53.81) Physical deconditioning   (W19.XXXD) Fall, subsequent encounter  (R26.89) Decreased mobility  (Z99.89) Walker as ambulation aid  (T79.6XXD) Traumatic rhabdomyolysis, subsequent encounter  (M62.81) Generalized muscle weakness  (S22.42XD) Closed fracture of multiple ribs of left side with routine healing, subsequent encounter  (S32.040S) Closed compression fracture of L4 lumbar vertebra, sequela  Comment: Patient had a fall at home.  Noted to have a chronic L4 compression fracture  And acute multiple rib fractures  For pain medication she has lidocaine patches, Tylenol,  Has bowel regimen  Continue with therapy   Plan: Continue with plan of care no changes at this time, adjustment as needed          (G25.2) Resting tremor  Comment: Chronic.  Plan: Follow with neurology  Pt prefers to follow up after TCU stay                   Electronically signed by:      ROXANNE Stafford CNP on 4/23/2024 at 9:40 PM    MEDICATIONS:  MED REC  REQUIRED  Post Medication Reconciliation Status:  Medication reconciliation previously completed during another office visit

## 2024-04-23 NOTE — LETTER
"    4/23/2024        RE: Dorothea Dugan  [address]  [place] [address]         [facility] GERIATRICS        Visit Type: RECHECK     Facility:   [facility] ([facility]) [[id number]]         History of Present Illness: Dorothea Dugan is a 78 year old female     Past medical history includes  Fall  Decreased mobility  Rhabdomyolysis  Generalized weakness  Mechanical fall  Left 7-9 rib fractures    L4 compression fracture, suspect chronic (no acute pain)  Hypokalemia  Hypomagnesemia  Normocytic anemia  Resting tremor  Onychomycosis   Hyperparathyroidism  History of right femoral neck hip fracture status post LAURA 2020  Anxiety  Depression    She is eating breakfast during the visit  Weight is up to 104#  VS stable.   No complaints.     Current Outpatient Medications   Medication Sig Dispense Refill     acetaminophen (TYLENOL) 500 MG tablet Take 2 tablets (1,000 mg) by mouth 3 times daily       biotin 1000 MCG TABS tablet Take 500 mcg by mouth daily       escitalopram (LEXAPRO) 10 MG tablet Take 10 mg by mouth daily       Lidocaine (LIDOCARE) 4 % Patch Place 1 patch onto the skin every 24 hours To prevent lidocaine toxicity, patient should be patch free for 12 hrs daily.       Multiple Vitamin (MULTI VITAMIN DAILY PO) Take 1 tablet by mouth daily        senna-docusate (SENOKOT-S/PERICOLACE) 8.6-50 MG tablet Take 1 tablet by mouth 2 times daily as needed for constipation       Vitamin D, Cholecalciferol, 25 MCG (1000 UT) TABS Take 25 mcg by mouth daily         ALLERGIES:Strawberry extract and Pcn [penicillins]    Vitals:  /66   Pulse 80   Temp 97.7  F (36.5  C)   Resp 16   Ht 1.562 m (5' 1.5\")   Wt 47.3 kg (104 lb 3.2 oz)   SpO2 96%   BMI 19.37 kg/m    Body mass index is 19.37 kg/m .    Review of Systems   All other systems reviewed and are negative.         Physical Exam  Vitals and nursing note reviewed.   Cardiovascular:      Rate and Rhythm: Normal rate.   Pulmonary:      Effort: Pulmonary " effort is normal.      Breath sounds: Normal breath sounds.   Neurological:      Mental Status: She is alert. Mental status is at baseline.   Psychiatric:         Attention and Perception: Attention normal.         Mood and Affect: Mood normal.         Speech: Speech normal.         Behavior: Behavior normal.         Thought Content: Thought content normal.         Cognition and Memory: Cognition normal.         Judgment: Judgment normal.           Labs:     Most Recent 3 CBC's:  Recent Labs   Lab Test 03/20/24  0617 03/15/24  1058 03/14/24  1047 03/13/24  1103 03/12/24  0834 03/11/24  1032   WBC 7.0  --   --   --  6.3 10.5   HGB 10.7* 12.3 10.4*   < > 11.6* 12.5   MCV 87  --   --   --  85 85     --   --   --  208 239    < > = values in this interval not displayed.     Most Recent 3 BMP's:  Recent Labs   Lab Test 04/03/24  0556 03/20/24  0617 03/15/24  1058    141 139   POTASSIUM 3.8 3.9 4.1   CHLORIDE 106 103 106   CO2 27 28 21*   BUN 21.2 18.2 5.8*   CR 0.74 0.82 0.63   ANIONGAP 9 10 12   TERENCE 8.7* 8.5* 8.1*   GLC 68* 71 96       Assessment/Plan:  (R53.81) Physical deconditioning   (W19.XXXD) Fall, subsequent encounter  (R26.89) Decreased mobility  (Z99.89) Walker as ambulation aid  (T79.6XXD) Traumatic rhabdomyolysis, subsequent encounter  (M62.81) Generalized muscle weakness  (S22.42XD) Closed fracture of multiple ribs of left side with routine healing, subsequent encounter  (S32.040S) Closed compression fracture of L4 lumbar vertebra, sequela  Comment: Patient had a fall at home.  Noted to have a chronic L4 compression fracture  And acute multiple rib fractures  For pain medication she has lidocaine patches, Tylenol,  Has bowel regimen  Continue with therapy   Plan: Continue with plan of care no changes at this time, adjustment as needed          (G25.2) Resting tremor  Comment: Chronic.  Plan: Follow with neurology  Pt prefers to follow up after TCU stay                   Electronically signed  by:      ROXANNE Stafford CNP on 4/23/2024 at 9:40 PM    MEDICATIONS:  MED REC REQUIRED  Post Medication Reconciliation Status:  Medication reconciliation previously completed during another office visit                  Sincerely,        ROXANNE Stafford CNP

## 2024-04-30 ENCOUNTER — DISCHARGE SUMMARY NURSING HOME (OUTPATIENT)
Dept: GERIATRICS | Facility: CLINIC | Age: 79
End: 2024-04-30
Payer: MEDICARE

## 2024-04-30 VITALS
HEIGHT: 62 IN | BODY MASS INDEX: 19.25 KG/M2 | OXYGEN SATURATION: 98 % | WEIGHT: 104.6 LBS | DIASTOLIC BLOOD PRESSURE: 86 MMHG | HEART RATE: 74 BPM | TEMPERATURE: 97.2 F | RESPIRATION RATE: 18 BRPM | SYSTOLIC BLOOD PRESSURE: 157 MMHG

## 2024-04-30 DIAGNOSIS — F32.5 DEPRESSION, MAJOR, IN REMISSION (H): ICD-10-CM

## 2024-04-30 DIAGNOSIS — R26.89 DECREASED MOBILITY: ICD-10-CM

## 2024-04-30 DIAGNOSIS — Z99.89 WALKER AS AMBULATION AID: ICD-10-CM

## 2024-04-30 DIAGNOSIS — T79.6XXD TRAUMATIC RHABDOMYOLYSIS, SUBSEQUENT ENCOUNTER: ICD-10-CM

## 2024-04-30 DIAGNOSIS — G25.2 RESTING TREMOR: ICD-10-CM

## 2024-04-30 DIAGNOSIS — E87.8 ELECTROLYTE ABNORMALITY: ICD-10-CM

## 2024-04-30 DIAGNOSIS — S32.040S CLOSED COMPRESSION FRACTURE OF L4 LUMBAR VERTEBRA, SEQUELA: ICD-10-CM

## 2024-04-30 DIAGNOSIS — R53.81 PHYSICAL DECONDITIONING: Primary | ICD-10-CM

## 2024-04-30 DIAGNOSIS — E21.3 HYPERPARATHYROIDISM (H): ICD-10-CM

## 2024-04-30 DIAGNOSIS — F32.A ANXIETY AND DEPRESSION: ICD-10-CM

## 2024-04-30 DIAGNOSIS — R53.1 GENERALIZED WEAKNESS: ICD-10-CM
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
## 2024-04-30 DIAGNOSIS — S22.42XD CLOSED FRACTURE OF MULTIPLE RIBS OF LEFT SIDE WITH ROUTINE HEALING, SUBSEQUENT ENCOUNTER: ICD-10-CM

## 2024-04-30 DIAGNOSIS — F41.9 ANXIETY AND DEPRESSION: ICD-10-CM

## 2024-04-30 PROCEDURE — 99316 NF DSCHRG MGMT 30 MIN+: CPT | Performed by: NURSE PRACTITIONER

## 2024-04-30 NOTE — LETTER
4/30/2024        RE: Dorothea Dugan  [address]  [place] [address]        [facility] GERIATRICS DISCHARGE SUMMARY  PATIENT'S NAME: Dorothea Dugan  YOB: 1945  MEDICAL RECORD NUMBER:  [id number]  Place of Service where encounter took place:  [facility] (TCU) [[id number]]    PRIMARY CARE PROVIDER AND CLINIC RESPONSIBLE AFTER TRANSFER:   Miryam Dang, , [address] / [place] [address]    [facility] Provider     Transferring providers: Oralia OLIVEIRA CNP; Darlin Alcantara MD  Recent Hospitalization/ED:  [facility] Hospital stay 3/11/24 to 3/15/24.  Date of SNF Admission: March 15, 2024  Date of SNF (anticipated) Discharge: 5/3/24  Discharged to: previous independent home  Cognitive Scores: SLUMS: 28/30  Physical Function: per nursing, Requires maximum assist of one with bathing. Moderate AO1 with upper and lower body dressing, transfer, toileting. Set up with eating. SBA with grooming, oral cares. Minimum assist of one with bed mobility. PT only with ambulation using black 2ww  DME: yesica height wheelchair due to short stature and needs to place feet on the ground for propulsion    CODE STATUS/ADVANCE DIRECTIVES DISCUSSION:  No CPR- Do NOT Intubate   ALLERGIES: Strawberry extract and Pcn [penicillins]    Past medical history includes  Fall  Decreased mobility  Rhabdomyolysis  Generalized weakness  Mechanical fall  Left 7-9 rib fractures    L4 compression fracture, suspect chronic (no acute pain)  Hypokalemia  Hypomagnesemia  Normocytic anemia  Resting tremor  Onychomycosis   Hyperparathyroidism  History of right femoral neck hip fracture status post LAURA 2020  Anxiety  Depression        On 3/11/2022, patient presented after a fall at home.  She laid on the ground for several hours before her  found her.  Her CK level was elevated to 4331 she was given IV fluids.  A CT pelvis showed no acute fractures.  Patient was unable to ambulate in the  emergency room after multiple attempts.  Head CT was negative for acute pathology.   X-ray showed small left pleural effusion and L7-9 rib fractures.       NURSING FACILITY COURSE   Medication Changes/Rationale:   Summary of nursing facility stay:     (R53.81) Physical deconditioning   (W19.XXXD) Fall, subsequent encounter  (R26.89) Decreased mobility  (Z99.89) Walker as ambulation aid  (T79.6XXD) Traumatic rhabdomyolysis, subsequent encounter  (M62.81) Generalized muscle weakness  (S22.42XD) Closed fracture of multiple ribs of left side with routine healing, subsequent encounter  (S32.040S) Closed compression fracture of L4 lumbar vertebra, sequela  Comment: Patient had a fall at home.  Noted to have a chronic L4 compression fracture  acute multiple rib fractures  For pain medication she has lidocaine patches, Tylenol,  Per therapy : Pt amb 11-14 ft x3 w/ 2WW and CGA for improved functional gait. WC follow as well as pt needs multiple breaks  She can now take deep breaths          (G25.2) Resting tremor  Comment: Chronic.  Follow with neurology after discharge (per pt preference)        (E87.8) Electrolyte abnormality  Comment: resolved  Was taking potassium but was discontinued due to swallowing difficulty  Potassium on 4/3/2024 was 3.8        (E21.3) Hyperparathyroidism (H24)  Comment: Status post parathyroidectomy with Dr. Che     (F41.9,  F32.A) Anxiety and depression  (F32.5) Depression, major, in remission (H24)  Comment: Chronic.  Currently taking lexapro         Discharge Medications:  MED REC REQUIRED  Post Medication Reconciliation Status:  Discharge medications reconciled and changed, see notes/orders       Current Outpatient Medications   Medication Sig Dispense Refill     acetaminophen (TYLENOL) 500 MG tablet Take 2 tablets (1,000 mg) by mouth 3 times daily       biotin 1000 MCG TABS tablet Take 500 mcg by mouth daily       escitalopram (LEXAPRO) 10 MG tablet Take 10 mg by mouth daily        "Lidocaine (LIDOCARE) 4 % Patch Place 1 patch onto the skin every 24 hours To prevent lidocaine toxicity, patient should be patch free for 12 hrs daily.       Multiple Vitamin (MULTI VITAMIN DAILY PO) Take 1 tablet by mouth daily        senna-docusate (SENOKOT-S/PERICOLACE) 8.6-50 MG tablet Take 1 tablet by mouth 2 times daily as needed for constipation       Vitamin D, Cholecalciferol, 25 MCG (1000 UT) TABS Take 25 mcg by mouth daily            Controlled medications:   not applicable/none     Past Medical History:   Past Medical History:   Diagnosis Date     Anxiety      Anxiety state, unspecified      Breast screening, unspecified      Fracture of head of femur (H)      Heart murmur      Insomnia, unspecified      Major depressive disorder, single episode, unspecified      Osteoporosis, unspecified      Parathyroid adenoma      Unspecified menopausal and postmenopausal disorder      Physical Exam:   Vitals: BP (!) 157/86   Pulse 74   Temp 97.2  F (36.2  C)   Resp 18   Ht 1.562 m (5' 1.5\")   Wt 47.4 kg (104 lb 9.6 oz)   SpO2 98%   BMI 19.44 kg/m    BMI: Body mass index is 19.44 kg/m .  GENERAL APPEARANCE:  Alert, in no distress, thin  RESP:  lungs clear to auscultation , no respiratory distress  CV:  Palpation and auscultation of heart done , rate-normal  PSYCH:  oriented X 3     SNF labs: Most Recent 3 CBC's:  Recent Labs   Lab Test 03/20/24  0617 03/15/24  1058 03/14/24  1047 03/13/24  1103 03/12/24  0834 03/11/24  1032   WBC 7.0  --   --   --  6.3 10.5   HGB 10.7* 12.3 10.4*   < > 11.6* 12.5   MCV 87  --   --   --  85 85     --   --   --  208 239    < > = values in this interval not displayed.     Most Recent 3 BMP's:  Recent Labs   Lab Test 04/03/24  0556 03/20/24  0617 03/15/24  1058    141 139   POTASSIUM 3.8 3.9 4.1   CHLORIDE 106 103 106   CO2 27 28 21*   BUN 21.2 18.2 5.8*   CR 0.74 0.82 0.63   ANIONGAP 9 10 12   TERENCE 8.7* 8.5* 8.1*   [facility] 68* 71 96       DISCHARGE PLAN:  Follow up labs: " BMP and CBC due 1-2 weeks to be drawn by home care with results to PCP  Medical Follow Up:     Follow up with primary care provider in 1-2 weeks     Discharge Services: Home Care:  Occupational Therapy, Physical Therapy, and Home Health Aide  Discharge Instructions Verbalized to Patient at Discharge:   None    TOTAL DISCHARGE TIME:   Greater than 30 minutes  Electronically signed by:    ROXANNE Stafford CNP on 4/30/2024 at 8:01 PM        Documentation of Face to Face and Certification for Home Health Services    I certify that patient: Dorothea Dugan is under my care and that I, or a nurse practitioner or physician's assistant working with me, had a face-to-face encounter that meets the physician face-to-face encounter requirements with this patient on: 04/30/24.    This encounter with the patient was in whole, or in part, for the following medical condition, which is the primary reason for home health care:        Physical deconditioning  Fall, subsequent encounter  Decreased mobility  Walker as ambulation aid  Traumatic rhabdomyolysis, subsequent encounter  Generalized weakness  Closed fracture of multiple ribs of left side with routine healing, subsequent encounter  Closed compression fracture of L4 lumbar vertebra, sequela  Resting tremor  Electrolyte abnormality  Hyperparathyroidism (H24)  Anxiety and depression  Depression, major, in remission (H24)    .    I certify that, based on my findings, the following services are medically necessary home health services: Occupational Therapy and Physical Therapy.    My clinical findings support the need for the above services because: Occupational Therapy Services are needed to assess and treat cognitive ability and address ADL safety due to impairment in mobility and generalized weakness. and Physical Therapy Services are needed to assess and treat the following functional impairments: mobility and generalized weakness.    Further, I certify that my clinical  findings support that this patient is homebound (i.e. absences from home require considerable and taxing effort and are for medical reasons or [employer] witness services or infrequently or of short duration when for other reasons) because: Requires assistance of another person or specialized equipment to access medical services because patient: Requires supervision of another for safe transfer...    Based on the above findings. I certify that this patient is confined to the home and needs intermittent skilled nursing care, physical therapy and/or speech therapy.  The patient is under my care, and I have initiated the establishment of the plan of care.  This patient will be followed by a physician who will periodically review the plan of care.  Physician/Provider to provide follow up care: Miryam Dang    Attending hospital physician (the Medicare certified [facility] provider): ROXANNE Stafford CNP  Physician Signature: See electronic signature associated with these discharge orders.  Date: 4/30/2024        [facility] GERIATRICS  Face to Face and Medical Necessity Statement for DME Provider visit    Patient: Dorothea Dugan  Gender: female  YOB: 1945  [place] Medical Record Number: [id number]  Demographics:  [address]  [place] [address]  [phone] (home)   Social Security Number: [id number]  Primary Care Provider: Miryam Dang  Insurance: Payor: MEDICARE / Plan: MEDICARE / Product Type: Medicare /     HPI: Dorothea Dugan is a 78 year old (1945), who is being seen today for a face to face provider visit at No question data found.. Medical necessity statement for DME included.     This patient requires the following: DME Ordered and Medical Necessity Statement .  Patient needs a yesica height wheelchair due to short stature and needs to place feet on the ground for propulsion        Wheelchair Documentation  Size: Patient needs a yesica height wheelchair due to short  stature and needs to place feet on the ground for propulsion  Corresponding cushion: Yes: standard  Standard foot rests: Yes  Elevating leg rests: No  Arm rests: Yes: standard  Lap tray: No  Dose the patient use oxygen? No   Is the patient able to propel wheelchair? Yes If no why not? -  And is there someone who can?yes  1. The patient has mobility limitations that impairs their ability to participate in one or more mobility related activities: Toileting, Feeding, Grooming, and Bathing.  The wheelchair is suitable and necessary for use in the patient's home.  2. The patient's mobility limitations cannot be safely resolved by using a cane/walker:No    Reason why a cane or walker will not meet the patient's needs. (ie: balance, tolerance, level of assistance) pt has poor balance, is frail, recent falls  3. The patients home has adequate access to use a manual wheelchair:Yes  4. The use of a manual wheelchair on a regular basis will improve the patients ability to participate in mobility related ADL's at home:Yes  5. The patient is willing to use a manual wheelchair at home:Yes  6. The patient has adequate upper body strength and the mental capability to safely use a manual wheelchair and/or has a caregiver that is able to assist: Yes  7. Does the patient have a lower extremity injury or edema?no  Reason for Type of Wheelchair  Patient weight: 104#    Dorothea Dugan has mobility limitations that impair her ability to participate in toileting, transferring, dressing, grooming, ambulation, and ADLs/MRADLs. A yesica height wheelchair due to short stature and needs to place feet on the ground for propulsion is suitable and necessary for use in the patient's home, with adequate space and access in the home. The patient is able to propel the manual w/c, willing the use the manual w/c at home, and also has caregivers than can assist at her home. The patient has adequate upper body strength and the mental capacity to safety use  "a yesica height wheelchair due to short stature and needs to place feet on the ground for propulsion and has caregivers who can assist. Patient's mobility limitations cannot be resolved with a cane, walker, or other assistive device due to impaired strength, impaired balance, low functional activity tolerance, and high fall risk. The use of a yesica height wheelchair due to short stature and needs to place feet on the ground for propulsion on a regular basis will improve the patient's ability to participate in MRADLs in the home    yesica height wheelchair due to short stature and needs to place feet on the ground for propulsion    Pt needing above DME with expected length of need of  99 years due to medical necessity associated with following diagnosis:         Physical deconditioning  Fall, subsequent encounter  Decreased mobility  Walker as ambulation aid  Traumatic rhabdomyolysis, subsequent encounter  Generalized weakness  Closed fracture of multiple ribs of left side with routine healing, subsequent encounter  Closed compression fracture of L4 lumbar vertebra, sequela  Resting tremor  Electrolyte abnormality  Hyperparathyroidism (H24)  Anxiety and depression  Depression, major, in remission (H24)      Past Medical History:   has a past medical history of Anxiety, Anxiety state, unspecified, Breast screening, unspecified, Fracture of head of femur (H), Heart murmur, Insomnia, unspecified, Major depressive disorder, single episode, unspecified, Osteoporosis, unspecified, Parathyroid adenoma, and Unspecified menopausal and postmenopausal disorder.    She has no past medical history of PONV (postoperative nausea and vomiting).    Review of Systems:  See above    Exam:  Vitals: BP (!) 157/86   Pulse 74   Temp 97.2  F (36.2  C)   Resp 18   Ht 1.562 m (5' 1.5\")   Wt 47.4 kg (104 lb 9.6 oz)   SpO2 98%   BMI 19.44 kg/m     BMI: Body mass index is 19.44 kg/m .   See above    Assessment/Plan:     Physical " deconditioning  Fall, subsequent encounter  Decreased mobility  Walker as ambulation aid  Traumatic rhabdomyolysis, subsequent encounter  Generalized weakness  Closed fracture of multiple ribs of left side with routine healing, subsequent encounter  Closed compression fracture of L4 lumbar vertebra, sequela  Resting tremor  Electrolyte abnormality  Hyperparathyroidism (H24)  Anxiety and depression  Depression, major, in remission (H24)      Orders:  1. Facility staff/TC to contact [employer] to get their order form for provider to fill out    ELECTRONICALLY SIGNED BY TAMAR CERTIFIED PROVIDER: ROXANNE Stafford CNP   NPI: [id number]  [facility] GERIATRICS  [address] [place] [address]             Sincerely,        ROXANNE Stafford CNP

## 2024-04-30 NOTE — PROGRESS NOTES
[facility] GERIATRICS DISCHARGE SUMMARY  PATIENT'S NAME: Dorothea Dugan  YOB: 1945  MEDICAL RECORD NUMBER:  [id number]  Place of Service where encounter took place:  [facility] (TCU) [[id number]]    PRIMARY CARE PROVIDER AND CLINIC RESPONSIBLE AFTER TRANSFER:   Miryam Dang DO, [address] / [place] [address]    G Provider     Transferring providers: Oralia OLIVEIRA CNP; Darlin Alcantara MD  Recent Hospitalization/ED:  [facility] stay 3/11/24 to 3/15/24.  Date of SNF Admission: March 15, 2024  Date of SNF (anticipated) Discharge: 5/3/24  Discharged to: previous independent home  Cognitive Scores: SLUMS: 28/30  Physical Function: per nursing, Requires maximum assist of one with bathing. Moderate AO1 with upper and lower body dressing, transfer, toileting. Set up with eating. SBA with grooming, oral cares. Minimum assist of one with bed mobility. PT only with ambulation using black 2ww  DME: yesica height wheelchair due to short stature and needs to place feet on the ground for propulsion    CODE STATUS/ADVANCE DIRECTIVES DISCUSSION:  No CPR- Do NOT Intubate   ALLERGIES: Strawberry extract and Pcn [penicillins]    Past medical history includes  Fall  Decreased mobility  Rhabdomyolysis  Generalized weakness  Mechanical fall  Left 7-9 rib fractures    L4 compression fracture, suspect chronic (no acute pain)  Hypokalemia  Hypomagnesemia  Normocytic anemia  Resting tremor  Onychomycosis   Hyperparathyroidism  History of right femoral neck hip fracture status post LAURA 2020  Anxiety  Depression        On 3/11/2022, patient presented after a fall at home.  She laid on the ground for several hours before her  found her.  Her CK level was elevated to 4331 she was given IV fluids.  A CT pelvis showed no acute fractures.  Patient was unable to ambulate in the emergency room after multiple attempts.  Head CT was negative for acute pathology.    X-ray showed small left pleural effusion and L7-9 rib fractures.       NURSING FACILITY COURSE   Medication Changes/Rationale:   Summary of nursing facility stay:     (R53.81) Physical deconditioning   (W19.XXXD) Fall, subsequent encounter  (R26.89) Decreased mobility  (Z99.89) Walker as ambulation aid  (T79.6XXD) Traumatic rhabdomyolysis, subsequent encounter  (M62.81) Generalized muscle weakness  (S22.42XD) Closed fracture of multiple ribs of left side with routine healing, subsequent encounter  (S32.040S) Closed compression fracture of L4 lumbar vertebra, sequela  Comment: Patient had a fall at home.  Noted to have a chronic L4 compression fracture  acute multiple rib fractures  For pain medication she has lidocaine patches, Tylenol,  Per therapy : Pt amb 11-14 ft x3 w/ 2WW and CGA for improved functional gait. WC follow as well as pt needs multiple breaks  She can now take deep breaths          (G25.2) Resting tremor  Comment: Chronic.  Follow with neurology after discharge (per pt preference)        (E87.8) Electrolyte abnormality  Comment: resolved  Was taking potassium but was discontinued due to swallowing difficulty  Potassium on 4/3/2024 was 3.8        (E21.3) Hyperparathyroidism (H24)  Comment: Status post parathyroidectomy with Dr. Che     (F41.9,  F32.A) Anxiety and depression  (F32.5) Depression, major, in remission (H24)  Comment: Chronic.  Currently taking lexapro         Discharge Medications:  MED REC REQUIRED  Post Medication Reconciliation Status:  Discharge medications reconciled and changed, see notes/orders       Current Outpatient Medications   Medication Sig Dispense Refill    acetaminophen (TYLENOL) 500 MG tablet Take 2 tablets (1,000 mg) by mouth 3 times daily      biotin 1000 MCG TABS tablet Take 500 mcg by mouth daily      escitalopram (LEXAPRO) 10 MG tablet Take 10 mg by mouth daily      Lidocaine (LIDOCARE) 4 % Patch Place 1 patch onto the skin every 24 hours To prevent lidocaine  "toxicity, patient should be patch free for 12 hrs daily.      Multiple Vitamin (MULTI VITAMIN DAILY PO) Take 1 tablet by mouth daily       senna-docusate (SENOKOT-S/PERICOLACE) 8.6-50 MG tablet Take 1 tablet by mouth 2 times daily as needed for constipation      Vitamin D, Cholecalciferol, 25 MCG (1000 UT) TABS Take 25 mcg by mouth daily            Controlled medications:   not applicable/none     Past Medical History:   Past Medical History:   Diagnosis Date    Anxiety     Anxiety state, unspecified     Breast screening, unspecified     Fracture of head of femur (H)     Heart murmur     Insomnia, unspecified     Major depressive disorder, single episode, unspecified     Osteoporosis, unspecified     Parathyroid adenoma     Unspecified menopausal and postmenopausal disorder      Physical Exam:   Vitals: BP (!) 157/86   Pulse 74   Temp 97.2  F (36.2  C)   Resp 18   Ht 1.562 m (5' 1.5\")   Wt 47.4 kg (104 lb 9.6 oz)   SpO2 98%   BMI 19.44 kg/m    BMI: Body mass index is 19.44 kg/m .  GENERAL APPEARANCE:  Alert, in no distress, thin  RESP:  lungs clear to auscultation , no respiratory distress  CV:  Palpation and auscultation of heart done , rate-normal  PSYCH:  oriented X 3     SNF labs: Most Recent 3 CBC's:  Recent Labs   Lab Test 03/20/24  0617 03/15/24  1058 03/14/24  1047 03/13/24  1103 03/12/24  0834 03/11/24  1032   WBC 7.0  --   --   --  6.3 10.5   HGB 10.7* 12.3 10.4*   < > 11.6* 12.5   MCV 87  --   --   --  85 85     --   --   --  208 239    < > = values in this interval not displayed.     Most Recent 3 BMP's:  Recent Labs   Lab Test 04/03/24  0556 03/20/24  0617 03/15/24  1058    141 139   POTASSIUM 3.8 3.9 4.1   CHLORIDE 106 103 106   CO2 27 28 21*   BUN 21.2 18.2 5.8*   CR 0.74 0.82 0.63   ANIONGAP 9 10 12   TERENCE 8.7* 8.5* 8.1*   GLC 68* 71 96       DISCHARGE PLAN:  Follow up labs: BMP and CBC due 1-2 weeks to be drawn by home care with results to PCP  Medical Follow Up:     Follow up " with primary care provider in 1-2 weeks     Discharge Services: Home Care:  Occupational Therapy, Physical Therapy, and Home Health Aide  Discharge Instructions Verbalized to Patient at Discharge:   None    TOTAL DISCHARGE TIME:   Greater than 30 minutes  Electronically signed by:    ROXANNE Stafford CNP on 4/30/2024 at 8:01 PM        Documentation of Face to Face and Certification for Home Health Services    I certify that patient: Dortohea Dugan is under my care and that I, or a nurse practitioner or physician's assistant working with me, had a face-to-face encounter that meets the physician face-to-face encounter requirements with this patient on: 04/30/24.    This encounter with the patient was in whole, or in part, for the following medical condition, which is the primary reason for home health care:        Physical deconditioning  Fall, subsequent encounter  Decreased mobility  Walker as ambulation aid  Traumatic rhabdomyolysis, subsequent encounter  Generalized weakness  Closed fracture of multiple ribs of left side with routine healing, subsequent encounter  Closed compression fracture of L4 lumbar vertebra, sequela  Resting tremor  Electrolyte abnormality  Hyperparathyroidism (H24)  Anxiety and depression  Depression, major, in remission (H24)    .    I certify that, based on my findings, the following services are medically necessary home health services: Occupational Therapy and Physical Therapy.    My clinical findings support the need for the above services because: Occupational Therapy Services are needed to assess and treat cognitive ability and address ADL safety due to impairment in mobility and generalized weakness. and Physical Therapy Services are needed to assess and treat the following functional impairments: mobility and generalized weakness.    Further, I certify that my clinical findings support that this patient is homebound (i.e. absences from home require considerable and taxing  effort and are for medical reasons or [employer] services or infrequently or of short duration when for other reasons) because: Requires assistance of another person or specialized equipment to access medical services because patient: Requires supervision of another for safe transfer...    Based on the above findings. I certify that this patient is confined to the home and needs intermittent skilled nursing care, physical therapy and/or speech therapy.  The patient is under my care, and I have initiated the establishment of the plan of care.  This patient will be followed by a physician who will periodically review the plan of care.  Physician/Provider to provide follow up care: Miryam Dang    Attending hospital physician (the Medicare certified [facility] provider): ROXANNE Stafford CNP  Physician Signature: See electronic signature associated with these discharge orders.  Date: 4/30/2024        [facility] GERIATRICS  Face to Face and Medical Necessity Statement for DME Provider visit    Patient: Dorothea Dugan  Gender: female  YOB: 1945  [place] Medical Record Number: [id number]  Demographics:  [address]  [place] [address]  [phone] (home)   Social Security Number: [id number]  Primary Care Provider: Miryam Dang  Insurance: Payor: MEDICARE / Plan: MEDICARE / Product Type: Medicare /     HPI: Dorothea Dugan is a 78 year old (1945), who is being seen today for a face to face provider visit at No question data found.. Medical necessity statement for DME included.     This patient requires the following: DME Ordered and Medical Necessity Statement .  Patient needs a yesica height wheelchair due to short stature and needs to place feet on the ground for propulsion        Wheelchair Documentation  Size: Patient needs a yesica height wheelchair due to short stature and needs to place feet on the ground for propulsion  Corresponding cushion: Yes: standard  Standard  foot rests: Yes  Elevating leg rests: No  Arm rests: Yes: standard  Lap tray: No  Dose the patient use oxygen? No   Is the patient able to propel wheelchair? Yes If no why not? -  And is there someone who can?yes  1. The patient has mobility limitations that impairs their ability to participate in one or more mobility related activities: Toileting, Feeding, Grooming, and Bathing.  The wheelchair is suitable and necessary for use in the patient's home.  2. The patient's mobility limitations cannot be safely resolved by using a cane/walker:No    Reason why a cane or walker will not meet the patient's needs. (ie: balance, tolerance, level of assistance) pt has poor balance, is frail, recent falls  3. The patients home has adequate access to use a manual wheelchair:Yes  4. The use of a manual wheelchair on a regular basis will improve the patients ability to participate in mobility related ADL's at home:Yes  5. The patient is willing to use a manual wheelchair at home:Yes  6. The patient has adequate upper body strength and the mental capability to safely use a manual wheelchair and/or has a caregiver that is able to assist: Yes  7. Does the patient have a lower extremity injury or edema?no  Reason for Type of Wheelchair  Patient weight: 104#    Dorothea Dugan has mobility limitations that impair her ability to participate in toileting, transferring, dressing, grooming, ambulation, and ADLs/MRADLs. A yesica height wheelchair due to short stature and needs to place feet on the ground for propulsion is suitable and necessary for use in the patient's home, with adequate space and access in the home. The patient is able to propel the manual w/c, willing the use the manual w/c at home, and also has caregivers than can assist at her home. The patient has adequate upper body strength and the mental capacity to safety use a yesica height wheelchair due to short stature and needs to place feet on the ground for propulsion and has  "caregivers who can assist. Patient's mobility limitations cannot be resolved with a cane, walker, or other assistive device due to impaired strength, impaired balance, low functional activity tolerance, and high fall risk. The use of a yesica height wheelchair due to short stature and needs to place feet on the ground for propulsion on a regular basis will improve the patient's ability to participate in MRADLs in the home    yesica height wheelchair due to short stature and needs to place feet on the ground for propulsion    Pt needing above DME with expected length of need of  99 years due to medical necessity associated with following diagnosis:         Physical deconditioning  Fall, subsequent encounter  Decreased mobility  Walker as ambulation aid  Traumatic rhabdomyolysis, subsequent encounter  Generalized weakness  Closed fracture of multiple ribs of left side with routine healing, subsequent encounter  Closed compression fracture of L4 lumbar vertebra, sequela  Resting tremor  Electrolyte abnormality  Hyperparathyroidism (H24)  Anxiety and depression  Depression, major, in remission (H24)      Past Medical History:   has a past medical history of Anxiety, Anxiety state, unspecified, Breast screening, unspecified, Fracture of head of femur (H), Heart murmur, Insomnia, unspecified, Major depressive disorder, single episode, unspecified, Osteoporosis, unspecified, Parathyroid adenoma, and Unspecified menopausal and postmenopausal disorder.    She has no past medical history of PONV (postoperative nausea and vomiting).    Review of Systems:  See above    Exam:  Vitals: BP (!) 157/86   Pulse 74   Temp 97.2  F (36.2  C)   Resp 18   Ht 1.562 m (5' 1.5\")   Wt 47.4 kg (104 lb 9.6 oz)   SpO2 98%   BMI 19.44 kg/m     BMI: Body mass index is 19.44 kg/m .   See above    Assessment/Plan:     Physical deconditioning  Fall, subsequent encounter  Decreased mobility  Walker as ambulation aid  Traumatic rhabdomyolysis, " subsequent encounter  Generalized weakness  Closed fracture of multiple ribs of left side with routine healing, subsequent encounter  Closed compression fracture of L4 lumbar vertebra, sequela  Resting tremor  Electrolyte abnormality  Hyperparathyroidism (H24)  Anxiety and depression  Depression, major, in remission (H24)      Orders:  1. Facility staff/TC to contact DME company to get their order form for provider to fill out    ELECTRONICALLY SIGNED BY TAMAR CERTIFIED PROVIDER: ROXANNE Stafford CNP   NPI: [id number]  [facility]  [address] [place] [address]

## 2024-12-22 ENCOUNTER — HEALTH MAINTENANCE LETTER (OUTPATIENT)
Age: 79
End: 2024-12-22

## (undated) DEVICE — NDL 22GA 1.5"

## (undated) DEVICE — NDL 25GA 1.5" 305127

## (undated) DEVICE — SOL NACL 0.9% IRRIG 1000ML BOTTLE 2F7124

## (undated) DEVICE — SU STRATAFIX PDS PLUS 1 CT-1 18" SXPP1A404

## (undated) DEVICE — GLOVE PROTEXIS W/NEU-THERA 6.5  2D73TE65

## (undated) DEVICE — ESU GROUND PAD UNIVERSAL W/O CORD

## (undated) DEVICE — SU VICRYL 2-0 TIE 12X18" J905T

## (undated) DEVICE — LINEN TOWEL PACK X5 5464

## (undated) DEVICE — Device

## (undated) DEVICE — SOL WATER IRRIG 1000ML BOTTLE 2F7114

## (undated) DEVICE — SYR 03ML LL W/O NDL 309657

## (undated) DEVICE — SU VICRYL 2-0 CT-1 27" UND J259H

## (undated) DEVICE — ESU PENCIL W/SMOKE EVAC CVPLP2000

## (undated) DEVICE — CATH TRAY FOLEY SURESTEP 16FR WDRAIN BAG STLK LATEX A300316A

## (undated) DEVICE — GOWN IMPERVIOUS SPECIALTY XL/XLONG 39049

## (undated) DEVICE — DRSG STERI STRIP 1/4X3" R1541

## (undated) DEVICE — DRSG TEGADERM 2 1/2X 2 3/4"

## (undated) DEVICE — GLOVE PROTEXIS BLUE W/NEU-THERA 8.0  2D73EB80

## (undated) DEVICE — NIM PROBE PRASS STIMULATOR PROTECTED TIP 8225101

## (undated) DEVICE — SOL BENZOIN 0.5OZ

## (undated) DEVICE — WIPES FOLEY CARE SURESTEP PROVON DFC100

## (undated) DEVICE — MANIFOLD NEPTUNE 4 PORT 700-20

## (undated) DEVICE — DRAIN JACKSON PRATT 10FR ROUND SU130-1321

## (undated) DEVICE — PACK UNIVERSAL SPLIT 29131

## (undated) DEVICE — BLADE SAW SAGITTAL STRK 25X79.5X1.24MM 4/2000 2108-318-000

## (undated) DEVICE — GLOVE PROTEXIS BLUE W/NEU-THERA 7.5  2D73EB75

## (undated) DEVICE — SU MONOCRYL 4-0 P-3 18" UND Y494G

## (undated) DEVICE — SYR EAR BULB 3OZ 0035830

## (undated) DEVICE — IMM PILLOW ABDUCT HIP MED 31143061

## (undated) DEVICE — IMM PILLOW ABDUCT HIP MED

## (undated) DEVICE — DRSG AQUACEL AG 3.5X9.75" HYDROFIBER 412011

## (undated) DEVICE — ADH SKIN CLOSURE PREMIERPRO EXOFIN 1.0ML 3470

## (undated) DEVICE — DRAPE CONVERTORS U-DRAPE 60X72" 8476

## (undated) DEVICE — BLADE KNIFE SURG 15 371115

## (undated) DEVICE — SPONGE LAP 18X18" X8435

## (undated) DEVICE — PAD FOAM MCGUIRE KIT 0814-0150

## (undated) DEVICE — PREP CHLORAPREP 26ML TINTED ORANGE  260815

## (undated) DEVICE — DRAPE IOBAN INCISE 23X17" 6650EZ

## (undated) DEVICE — GLOVE PROTEXIS W/NEU-THERA 7.5  2D73TE75

## (undated) DEVICE — SU ETHIBOND 5 V-37 4X30" MB66G

## (undated) DEVICE — GLOVE PROTEXIS BLUE W/NEU-THERA 7.0  2D73EB70

## (undated) DEVICE — GLOVE PROTEXIS POWDER FREE 8.0 ORTHOPEDIC 2D73ET80

## (undated) DEVICE — SUCTION CANISTER MEDIVAC LINER 3000ML W/LID 65651-530

## (undated) DEVICE — SU VICRYL 0 CT-1 36" J346H

## (undated) DEVICE — PACK TOTAL HIP W/POUCH SOP15HPFSM

## (undated) DEVICE — SU ETHIBOND 2 V-37 4X30" MX69G

## (undated) DEVICE — BONE CLEANING TIP INTERPULSE FEMORAL CANAL 0210-008-000

## (undated) DEVICE — SU VICRYL 3-0 SH 27" UND J416H

## (undated) DEVICE — DRAIN JACKSON PRATT RESERVOIR 100ML SU130-1305

## (undated) DEVICE — ESU ELEC BLADE 2.75" COATED/INSULATED E1455

## (undated) DEVICE — PACK MINOR SBA15MIFSE

## (undated) DEVICE — SU VICRYL 4-0 TIE 12X18" DYED J103T

## (undated) DEVICE — DEVICE RETRIEVER HEWSON 71111579

## (undated) DEVICE — DRSG TELFA 2X3"

## (undated) DEVICE — HOOD FLYTE W/PEELAWAY 408-800-100

## (undated) DEVICE — SUCTION IRR SYSTEM W/O TIP INTERPULSE HANDPIECE 0210-100-000

## (undated) DEVICE — SU MONOCRYL 3-0 PS-2 27" Y427H

## (undated) DEVICE — PREP CHLORAPREP W/ORANGE TINT 10.5ML 930715

## (undated) RX ORDER — PROPOFOL 10 MG/ML
INJECTION, EMULSION INTRAVENOUS
Status: DISPENSED
Start: 2020-07-14

## (undated) RX ORDER — ONDANSETRON 2 MG/ML
INJECTION INTRAMUSCULAR; INTRAVENOUS
Status: DISPENSED
Start: 2020-07-14

## (undated) RX ORDER — NEOSTIGMINE METHYLSULFATE 1 MG/ML
VIAL (ML) INJECTION
Status: DISPENSED
Start: 2020-07-14

## (undated) RX ORDER — GLYCOPYRROLATE 0.2 MG/ML
INJECTION, SOLUTION INTRAMUSCULAR; INTRAVENOUS
Status: DISPENSED
Start: 2020-07-14

## (undated) RX ORDER — HYDROCODONE BITARTRATE AND ACETAMINOPHEN 5; 325 MG/1; MG/1
TABLET ORAL
Status: DISPENSED
Start: 2021-08-20

## (undated) RX ORDER — DEXAMETHASONE SODIUM PHOSPHATE 4 MG/ML
INJECTION, SOLUTION INTRA-ARTICULAR; INTRALESIONAL; INTRAMUSCULAR; INTRAVENOUS; SOFT TISSUE
Status: DISPENSED
Start: 2020-07-14

## (undated) RX ORDER — FENTANYL CITRATE 50 UG/ML
INJECTION, SOLUTION INTRAMUSCULAR; INTRAVENOUS
Status: DISPENSED
Start: 2020-07-14

## (undated) RX ORDER — TRANEXAMIC ACID 650 MG/1
TABLET ORAL
Status: DISPENSED
Start: 2020-07-14

## (undated) RX ORDER — VASOPRESSIN 20 U/ML
INJECTION PARENTERAL
Status: DISPENSED
Start: 2020-07-14

## (undated) RX ORDER — ONDANSETRON 2 MG/ML
INJECTION INTRAMUSCULAR; INTRAVENOUS
Status: DISPENSED
Start: 2021-08-20

## (undated) RX ORDER — LIDOCAINE HYDROCHLORIDE 20 MG/ML
INJECTION, SOLUTION EPIDURAL; INFILTRATION; INTRACAUDAL; PERINEURAL
Status: DISPENSED
Start: 2021-08-20

## (undated) RX ORDER — VANCOMYCIN HYDROCHLORIDE 1 G/20ML
INJECTION, POWDER, LYOPHILIZED, FOR SOLUTION INTRAVENOUS
Status: DISPENSED
Start: 2020-07-14

## (undated) RX ORDER — LIDOCAINE HYDROCHLORIDE 20 MG/ML
INJECTION, SOLUTION EPIDURAL; INFILTRATION; INTRACAUDAL; PERINEURAL
Status: DISPENSED
Start: 2020-07-14

## (undated) RX ORDER — DEXAMETHASONE SODIUM PHOSPHATE 4 MG/ML
INJECTION, SOLUTION INTRA-ARTICULAR; INTRALESIONAL; INTRAMUSCULAR; INTRAVENOUS; SOFT TISSUE
Status: DISPENSED
Start: 2021-08-20

## (undated) RX ORDER — CLINDAMYCIN PHOSPHATE 900 MG/50ML
INJECTION, SOLUTION INTRAVENOUS
Status: DISPENSED
Start: 2020-07-14

## (undated) RX ORDER — FENTANYL CITRATE 50 UG/ML
INJECTION, SOLUTION INTRAMUSCULAR; INTRAVENOUS
Status: DISPENSED
Start: 2021-08-20

## (undated) RX ORDER — PROPOFOL 10 MG/ML
INJECTION, EMULSION INTRAVENOUS
Status: DISPENSED
Start: 2021-08-20